# Patient Record
Sex: MALE | Race: WHITE | NOT HISPANIC OR LATINO | URBAN - METROPOLITAN AREA
[De-identification: names, ages, dates, MRNs, and addresses within clinical notes are randomized per-mention and may not be internally consistent; named-entity substitution may affect disease eponyms.]

---

## 2017-02-07 ENCOUNTER — ALLIED HEALTH/NURSE VISIT (OUTPATIENT)
Dept: NURSING | Facility: CLINIC | Age: 47
End: 2017-02-07
Payer: COMMERCIAL

## 2017-02-07 DIAGNOSIS — Z23 NEED FOR HEPATITIS B VACCINATION: Primary | ICD-10-CM

## 2017-02-07 PROCEDURE — 90746 HEPB VACCINE 3 DOSE ADULT IM: CPT

## 2017-02-07 PROCEDURE — 90471 IMMUNIZATION ADMIN: CPT

## 2017-02-07 NOTE — NURSING NOTE
Prior to injection verified patient identity using patient's name and date of birth.  Screening Questionnaire for Adult Immunization    Are you sick today?   No   Do you have allergies to medications, food, a vaccine component or latex?   Yes   Have you ever had a serious reaction after receiving a vaccination?   No   Do you have a long-term health problem with heart disease, lung disease, asthma, kidney disease, metabolic disease (e.g. diabetes), anemia, or other blood disorder?   Yes   Do you have cancer, leukemia, HIV/AIDS, or any other immune system problem?   No   In the past 3 months, have you taken medications that affect  your immune system, such as prednisone, other steroids, or anticancer drugs; drugs for the treatment of rheumatoid arthritis, Crohn s disease, or psoriasis; or have you had radiation treatments?   No   Have you had a seizure, or a brain or other nervous system problem?   No   During the past year, have you received a transfusion of blood or blood     products, or been given immune (gamma) globulin or antiviral drug?   No   For women: Are you pregnant or is there a chance you could become        pregnant during the next month?   No   Have you received any vaccinations in the past 4 weeks?   No     Immunization questionnaire was positive for at least one answer.  Notified Dr. Malave.      MNVFC doesn't apply on this patient    Per orders of Dr. Malave, injection of Hep B given by Tierra Cates. Patient instructed to remain in clinic for 20 minutes afterwards, and to report any adverse reaction to me immediately.       Screening performed by Tierra Cates on 2/7/2017 at 10:33 AM.

## 2017-03-10 ASSESSMENT — ENCOUNTER SYMPTOMS
DOUBLE VISION: 0
EYE REDNESS: 0
EYE WATERING: 0
EYE IRRITATION: 1
EYE PAIN: 0

## 2017-03-13 ENCOUNTER — OFFICE VISIT (OUTPATIENT)
Dept: UROLOGY | Facility: CLINIC | Age: 47
End: 2017-03-13
Payer: COMMERCIAL

## 2017-03-13 VITALS — RESPIRATION RATE: 16 BRPM | SYSTOLIC BLOOD PRESSURE: 106 MMHG | HEART RATE: 64 BPM | DIASTOLIC BLOOD PRESSURE: 74 MMHG

## 2017-03-13 DIAGNOSIS — Z30.09 STERILIZATION CONSULT: Primary | ICD-10-CM

## 2017-03-13 DIAGNOSIS — E10.9 DIABETES MELLITUS TYPE 1, UNCOMPLICATED (H): ICD-10-CM

## 2017-03-13 LAB
ALT SERPL W P-5'-P-CCNC: 24 U/L (ref 0–70)
ANION GAP SERPL CALCULATED.3IONS-SCNC: 7 MMOL/L (ref 3–14)
BUN SERPL-MCNC: 15 MG/DL (ref 7–30)
CALCIUM SERPL-MCNC: 9.1 MG/DL (ref 8.5–10.1)
CHLORIDE SERPL-SCNC: 102 MMOL/L (ref 94–109)
CHOLEST SERPL-MCNC: 106 MG/DL
CO2 SERPL-SCNC: 31 MMOL/L (ref 20–32)
CREAT SERPL-MCNC: 0.89 MG/DL (ref 0.66–1.25)
CREAT UR-MCNC: 149 MG/DL
GFR SERPL CREATININE-BSD FRML MDRD: ABNORMAL ML/MIN/1.7M2
GLUCOSE SERPL-MCNC: 172 MG/DL (ref 70–99)
HBA1C MFR BLD: 8.4 % (ref 4.3–6)
HDLC SERPL-MCNC: 51 MG/DL
LDLC SERPL CALC-MCNC: 46 MG/DL
MICROALBUMIN UR-MCNC: 8 MG/L
MICROALBUMIN/CREAT UR: 5.23 MG/G CR (ref 0–17)
NONHDLC SERPL-MCNC: 55 MG/DL
POTASSIUM SERPL-SCNC: 4.3 MMOL/L (ref 3.4–5.3)
SODIUM SERPL-SCNC: 140 MMOL/L (ref 133–144)
TRIGL SERPL-MCNC: 46 MG/DL
TSH SERPL DL<=0.05 MIU/L-ACNC: 2.81 MU/L (ref 0.4–4)

## 2017-03-13 PROCEDURE — 80061 LIPID PANEL: CPT | Performed by: INTERNAL MEDICINE

## 2017-03-13 PROCEDURE — 82043 UR ALBUMIN QUANTITATIVE: CPT | Performed by: INTERNAL MEDICINE

## 2017-03-13 PROCEDURE — 36415 COLL VENOUS BLD VENIPUNCTURE: CPT | Performed by: INTERNAL MEDICINE

## 2017-03-13 PROCEDURE — 99203 OFFICE O/P NEW LOW 30 MIN: CPT | Performed by: UROLOGY

## 2017-03-13 PROCEDURE — 83036 HEMOGLOBIN GLYCOSYLATED A1C: CPT | Performed by: INTERNAL MEDICINE

## 2017-03-13 PROCEDURE — 80048 BASIC METABOLIC PNL TOTAL CA: CPT | Performed by: INTERNAL MEDICINE

## 2017-03-13 PROCEDURE — 84460 ALANINE AMINO (ALT) (SGPT): CPT | Performed by: INTERNAL MEDICINE

## 2017-03-13 PROCEDURE — 84443 ASSAY THYROID STIM HORMONE: CPT | Performed by: INTERNAL MEDICINE

## 2017-03-13 NOTE — PROGRESS NOTES
"Vasectomy Consult    Reason for visit:   Discuss as a method of birth control/sterilization.  He is interested in vasectomy scrotally.  Patient has 1 children and desires sterilization.  Does not want to use condom or having partners on birth control pills.  He has no erections problem.  He has no urinary complaints.    Current Outpatient Prescriptions   Medication Sig Dispense Refill     NOVOLOG PENFILL 100 UNIT/ML soln Use with Novopen carb counting with meals and using about 20 units per day 30 mL 3     glucagon (GLUCAGON EMERGENCY) 1 MG injection Use as directed 2 each 2     insulin glargine (LANTUS SOLOSTAR) 100 UNIT/ML PEN Inject Subcutaneous. Inject 9 units daily 15 mL 3     loratadine (CLARITIN) 10 MG tablet        insulin pen needle (B-D U/F) 31G X 5 MM Use 5 daily or as directed. 450 each 3     atorvastatin (LIPITOR) 10 MG tablet Take 1 tablet (10 mg) by mouth daily 90 tablet 3     blood glucose (ESTEVAN BREEZE 2) test strip Test 7 times daily 700 each 3     Insulin Pen Needle (PEN NEEDLES 5/16\") 31G X 8 MM MISC Use four times daily or as directed. 360 each 3     Injection Device for insulin (NOVOPEN JR, GREEN,) DUSTIN Use as directed with insulin 1 each 1     Sodium Fluoride (CLINPRO 5000) 1.1 % PSTE Apply  to affected area. Use once every night       Blood Glucose Monitoring Suppl (DatahugIA BREEZE 2 SYSTEM) W/DEVICE KIT Use daily       ACE/ARB NOT PRESCRIBED, INTENTIONAL, by Other route continuous prn.       ASPIRIN 81 MG OR TABS ONE DAILY 100 3     Allergies   Allergen Reactions     Ceftin Rash     Ampicillin Rash     Past Medical History   Diagnosis Date     Chronic lymphocytic thyroiditis 7/20/2011     Chronic lymphocytic thyroiditis 7/20/2011     Depressive disorder 06/01/1997     Treated for a couple of years; not currently experiencing     Diabetes 6/23/2009     Past Surgical History   Procedure Laterality Date     Hc tooth extraction w/forcep        Family History   Problem Relation Age of Onset     " DIABETES Brother      Asthma No family hx of      C.A.D. No family hx of      Hypertension Paternal Grandfather      Hypertension Maternal Grandfather      CEREBROVASCULAR DISEASE No family hx of      Breast Cancer Maternal Aunt      Cancer - colorectal No family hx of      Prostate Cancer No family hx of      DIABETES Brother      Coronary Artery Disease Other      Maternal great grandfather     Hypertension Maternal Grandmother      Hyperlipidemia Mother      Hyperlipidemia Maternal Grandmother      Hyperlipidemia Maternal Grandfather      Hyperlipidemia Brother      CEREBROVASCULAR DISEASE Maternal Grandfather      CEREBROVASCULAR DISEASE Paternal Grandmother      CEREBROVASCULAR DISEASE Paternal Grandfather      Anxiety Disorder Brother      Social History     Social History     Marital status:      Spouse name: N/A     Number of children: N/A     Years of education: N/A     Social History Main Topics     Smoking status: Never Smoker     Smokeless tobacco: Never Used     Alcohol use Yes      Comment: ~ 1 drink/day     Drug use: No     Sexual activity: Yes     Partners: Female     Birth control/ protection: Condom, Natural Family Planning      Comment: Would like to talk about vasectomy     Other Topics Concern     Parent/Sibling W/ Cabg, Mi Or Angioplasty Before 65f 55m? No     Social History Narrative       REVIEW OF SYSTEMS  =================  C: NEGATIVE for fever, chills, change in weight  I: NEGATIVE for worrisome rashes, moles or lesions  E/M: NEGATIVE for ear, mouth and throat problems  R: NEGATIVE for significant cough or SHORTNESS OF BREATH  CV:  NEGATIVE for chest pain, palpitations or peripheral edema  GI: NEGATIVE for nausea, abdominal pain, heartburn, or change in bowel habits  NEURO: NEGATIVE numbness/weakness  : see HPI  PSYCH: NEGATIVE depression/anxiety  LYmph: no new enlarged lymph nodes  Ortho: no new trauma/movements      Physical Exam:  /74 (BP Location: Right arm, Patient  Position: Chair, Cuff Size: Adult Regular)  Pulse 64  Resp 16   Patient is pleasant, in no acute distress, good general condition.  HEENT:  Normalcephalic, atraumatic  Lung: no evidence of respiratory distress    Abdomen: Soft, nondistended, non tender. No masses. No rebound or guarding.   Exam: penis no d/c testis no masses bilateral vas palpated no scrotal lesion  Skin: Warm and dry.  No redness.  Neuro: grossly normal  Psych normal mood and affect  Musculoskeletal  moving all extremities        Discussed    That vasectomy is permanent method of birth control.    That vasectomy can fail due to recanalization of the vas even many months/years later.    That he needs 2 negative sperm checks to be considered sterile    That there are other methods that are not permanent and also that the sperm can be frozen for later use.    How the technique is performed, risks of infection, bleeding, damage to the testes vessels and testes atrophy    Long term complication such as chronic and difficult to treat testes pain and questionable increase incidence of prostate cancer    That the procedure can be done at the clinic or hospital OR        Plan:    Stop Aspirin  Will schedule Vasectomy in the future

## 2017-03-13 NOTE — PATIENT INSTRUCTIONS
Please call [phone] to schedule your vasectomy or if you have any question.     Preparation for Vasectomy:  Shave the hair away from the base of your penis and around your testicles.  Wear snug underwear the day of the vasectomy to support your testicles.  Do not take aspirin, ibuprofen, advil, motrin, aleve products one week prior to your vasectomy.  Arrange for a  if you take any medication for relaxation from the physician.      General Vasectomy Information    Vasectomy is a surgery.  If it is successful, it will be impossible for you to ever father children.  The following information regards the male sterilization done by an operation called a vasectomy.  This is done in the physician's office.    The operation done to sterilize the male is easier, safer and much less expensive than the operation done to sterilize the woman.    Sterilization should be considered permanent.  For most males, once the operation is done, it can never me undone.  There are attempts made occasionally to reconnect the tubes that have been cut during the procedure, but this is very difficult and expensive and works only about 50-70% of the time.  In order for any of the physicians in our clinic to do a vasectomy, we require that you consider this a permanent form a sterilization.    A vasectomy can be done at any time, but it is best to think about having it done when you can take at least one day off from work and any excess activities.    Your decision to have a vasectomy should only be made with the following facts clear in mind.    1. First, a vasectomy is only one of several means of birth control.  Many form of temporary contraception are available.  If you have any questions about other methods, please discuss this with your physician.    2. A vasectomy may be unsuccessful in approximately one out of 1000 couples per year.  This occurs when the tubes which are cut during the procedure reconnect themselves.   Sterility cannot be guaranteed.    3. You should be aware that it is the current belief of the medical profession that a vasectomy procedure does not alter a male physically, physiologically or sexually.  Because each person is a unique individual, there is always the possibility of an adverse phychiatric reaction.  This can be best avoided by being very comfortable in your own mind that you want to have this done, and that you do not want to father any children in the future.  If this is not clear in your mind, this should be further discussed with your physician.    4. You will not notice a change in the volume of your ejaculate since sperm is a very small amount of the semen and it is only the sperm that is stopped from entering the ejaculate after a vasectomy.  Your prostate and seminal vesicle glands really supply most of the semen and this is not at all decreased after a vasectomy.  Also there is no effect on the male hormones.    5. You do not become sterile immediately following a vasectomy due to the fact that there is still sperm remaining in your system that must be eliminated by ejaculation.  For this reason, your sexual partner could still become pregnant for a period of time following the vasectomy operation.  It is necessary that contraceptive measures be used until you receive confirmation from your physician that you are sterile.  It takes approximately 12 ejaculations to clear the semen of sperm, but this can differ in different men.  For this reason, it is very important that your semen be checked for sperm before you are considered sterile, and this should be done approximately 12 weeks after your vasectomy.      6. Vasectomy has risks and benefits.  Among the risks are possible complications resulting from the procedure.  These risks include but are not limited to:   A.  Bleeding, infection, or hematoma occuring during or in the recovery period   from the procedure.   B.  Sperm granuloma or a  small pea to walnut sized lump which is a collection of   scar tissue and sperm in your scrotal sack and remains permanently   C.  There may be an increased risk of prostate cancer although the data is   unclear.

## 2017-03-13 NOTE — NURSING NOTE
"Chief Complaint   Patient presents with     Consult     vas consult       Initial /74 (BP Location: Right arm, Patient Position: Chair, Cuff Size: Adult Regular)  Pulse 64  Resp 16 Estimated body mass index is 24.65 kg/(m^2) as calculated from the following:    Height as of 9/21/16: 1.784 m (5' 10.25\").    Weight as of 9/21/16: 78.5 kg (173 lb).  Medication Reconciliation: complete   Sarah Lucas CMA      "

## 2017-03-13 NOTE — MR AVS SNAPSHOT
After Visit Summary   3/13/2017    Franklin Muir    MRN: [id number]           Patient Information     Date Of Birth          1970        Visit Information        Provider Department      3/13/2017 9:15 AM Geovanny Naqvi MD [facility]        Today's Diagnoses     Sterilization consult    -  1      Care Instructions     Please call [phone] to schedule your vasectomy or if you have any question.     Preparation for Vasectomy:  Shave the hair away from the base of your penis and around your testicles.  Wear snug underwear the day of the vasectomy to support your testicles.  Do not take aspirin, ibuprofen, advil, motrin, aleve products one week prior to your vasectomy.  Arrange for a  if you take any medication for relaxation from the physician.      General Vasectomy Information    Vasectomy is a surgery.  If it is successful, it will be impossible for you to ever father children.  The following information regards the male sterilization done by an operation called a vasectomy.  This is done in the physician's office.    The operation done to sterilize the male is easier, safer and much less expensive than the operation done to sterilize the woman.    Sterilization should be considered permanent.  For most males, once the operation is done, it can never me undone.  There are attempts made occasionally to reconnect the tubes that have been cut during the procedure, but this is very difficult and expensive and works only about 50-70% of the time.  In order for any of the physicians in our clinic to do a vasectomy, we require that you consider this a permanent form a sterilization.    A vasectomy can be done at any time, but it is best to think about having it done when you can take at least one day off from work and any excess activities.    Your decision to have a vasectomy should only be made with the following facts clear in mind.    1. First, a vasectomy is only one of  several means of birth control.  Many form of temporary contraception are available.  If you have any questions about other methods, please discuss this with your physician.    2. A vasectomy may be unsuccessful in approximately one out of 1000 couples per year.  This occurs when the tubes which are cut during the procedure reconnect themselves.  Sterility cannot be guaranteed.    3. You should be aware that it is the current belief of the medical profession that a vasectomy procedure does not alter a male physically, physiologically or sexually.  Because each person is a unique individual, there is always the possibility of an adverse phychiatric reaction.  This can be best avoided by being very comfortable in your own mind that you want to have this done, and that you do not want to father any children in the future.  If this is not clear in your mind, this should be further discussed with your physician.    4. You will not notice a change in the volume of your ejaculate since sperm is a very small amount of the semen and it is only the sperm that is stopped from entering the ejaculate after a vasectomy.  Your prostate and seminal vesicle glands really supply most of the semen and this is not at all decreased after a vasectomy.  Also there is no effect on the male hormones.    5. You do not become sterile immediately following a vasectomy due to the fact that there is still sperm remaining in your system that must be eliminated by ejaculation.  For this reason, your sexual partner could still become pregnant for a period of time following the vasectomy operation.  It is necessary that contraceptive measures be used until you receive confirmation from your physician that you are sterile.  It takes approximately 12 ejaculations to clear the semen of sperm, but this can differ in different men.  For this reason, it is very important that your semen be checked for sperm before you are considered sterile, and this  should be done approximately 12 weeks after your vasectomy.      6. Vasectomy has risks and benefits.  Among the risks are possible complications resulting from the procedure.  These risks include but are not limited to:   A.  Bleeding, infection, or hematoma occuring during or in the recovery period   from the procedure.   B.  Sperm granuloma or a small pea to walnut sized lump which is a collection of   scar tissue and sperm in your scrotal sack and remains permanently   C.  There may be an increased risk of prostate cancer although the data is   unclear.          Follow-ups after your visit        Your next 10 appointments already scheduled     Mar 21, 2017  1:30 PM CDT   (Arrive by 1:00 PM)   RETURN DIABETES with Stacy Wilson MD   [facility] ([facility])    [address] [place] [id number] [phone]              Who to contact     If you have questions or need follow up information about today's clinic visit or your schedule please contact [facility] directly at [phone].  Normal or non-critical lab and imaging results will be communicated to you by MyChart, letter or phone within 4 business days after the clinic has received the results. If you do not hear from us within 7 days, please contact the clinic through [employer] or phone. If you have a critical or abnormal lab result, we will notify you by phone as soon as possible.  Submit refill requests through [employer] or call your pharmacy and they will forward the refill request to us. Please allow 3 business days for your refill to be completed.          Additional Information About Your Visit        [employer] Information     [employer] gives you secure access to your electronic health record. If you see a primary care provider, you can also send messages to your care team and make appointments. If you have questions, please call your primary care clinic.  If you do  not have a primary care provider, please call [phone] and they will assist you.        Care EveryWhere ID     This is your Care EveryWhere ID. This could be used by other organizations to access your [facility] medical records  [id number]        Your Vitals Were     Pulse Respirations                64 16           Blood Pressure from Last 3 Encounters:   03/13/17 106/74   09/21/16 112/67   08/15/16 110/78    Weight from Last 3 Encounters:   09/21/16 78.5 kg (173 lb)   08/15/16 75.3 kg (166 lb)   06/01/16 80.1 kg (176 lb 8 oz)              Today, you had the following     No orders found for display       Primary Care Provider Office Phone # Fax #    Ruddy Malave [phone]       [address] [address]        Thank you!     Thank you for choosing [facility]  for your care. Our goal is always to provide you with excellent care. Hearing back from our patients is one way we can continue to improve our services. Please take a few minutes to complete the written survey that you may receive in the mail after your visit with us. Thank you!             Your Updated Medication List - Protect others around you: Learn how to safely use, store and throw away your medicines at www.disposemymeds.org.          This list is accurate as of: 3/13/17  9:22 AM.  Always use your most recent med list.                   Brand Name Dispense Instructions for use    ACE/ARB NOT PRESCRIBED (INTENTIONAL)      by Other route continuous prn.       aspirin 81 MG tablet     100    ONE DAILY       atorvastatin 10 MG tablet    LIPITOR    90 tablet    Take 1 tablet (10 mg) by mouth daily       blood glucose monitoring meter device kit      Use daily       blood glucose test strip    ESTEVAN BREEZE 2    700 each    Test 7 times daily       CLINPRO 5000 1.1 % Pste   Generic drug:  Sodium Fluoride      Apply  to affected area. Use once every night        "glucagon 1 MG kit    GLUCAGON EMERGENCY    2 each    Use as directed       Injection Device for insulin Carmen    NOVOPEN JR (GREEN)    1 each    Use as directed with insulin       insulin glargine 100 UNIT/ML injection    LANTUS SOLOSTAR    15 mL    Inject Subcutaneous. Inject 9 units daily       loratadine 10 MG tablet    CLARITIN         NovoLOG PENFILL 100 UNIT/ML injection   Generic drug:  insulin aspart     30 mL    Use with Novopen carb counting with meals and using about 20 units per day       * pen needles 5/16\" 31G X 8 MM Misc     360 each    Use four times daily or as directed.       * insulin pen needle 31G X 5 MM    B-D U/F    450 each    Use 5 daily or as directed.       * Notice:  This list has 2 medication(s) that are the same as other medications prescribed for you. Read the directions carefully, and ask your doctor or other care provider to review them with you.      "

## 2017-03-21 ENCOUNTER — OFFICE VISIT (OUTPATIENT)
Dept: ENDOCRINOLOGY | Facility: CLINIC | Age: 47
End: 2017-03-21

## 2017-03-21 VITALS
HEART RATE: 64 BPM | WEIGHT: 171.5 LBS | SYSTOLIC BLOOD PRESSURE: 118 MMHG | BODY MASS INDEX: 24.55 KG/M2 | HEIGHT: 70 IN | DIASTOLIC BLOOD PRESSURE: 79 MMHG

## 2017-03-21 DIAGNOSIS — E10.9 TYPE 1 DIABETES MELLITUS WITHOUT COMPLICATION (H): Primary | ICD-10-CM

## 2017-03-21 ASSESSMENT — PAIN SCALES - GENERAL: PAINLEVEL: NO PAIN (0)

## 2017-03-21 NOTE — PROGRESS NOTES
Diabetes and Endocrinology Outpatient Visit        HPI:   Franklin is a 46 year old male with type 1 DM (diagnosed ,  and anti DEE > 250), positive TPO antibody with normal TFT who was here for routine follow up.  He used to see Dr. Swenson in our clinic, and he is a new patient to me.    He was last seen in clinic 2016.    He is currently on Lantus to 10 U daily at 7 AM and Novolog 1 unit/ 20 grams of CHO with meals and snacks, and for pre-meal correction @ 1/2 U per 30 mg/dL above 200.     Download from his meter shows patient is checking his blood glucose 2.8 times per day, average is 171 with a standard deviation of 63.  Lowest recorded blood glucose value is 64.   BG in the AM are elevated (~195), ~ 182 at lunch time and 154 in the evening.  He denies hypoglycemia during the night.  Franklin develops symptoms of hypoglycemia around BG of 70  A1c was 8.4% on 3/14/17    Franklin was traveling and has changed his eating and exercise routine while away, and has also had a cold for the past few days, so he believes these have impacted his BG values, as his A1c is now 8.4%.  He is usually very active, and does cardio plus weight lifting for one hour three times per week, usually between breakfast and lunch, and plays basketball in the evenings.  He is on sabbatical, so this is also a change for him.     Diabetes complications  Retinopathy: No recent vision changes and up to date with yearly eye exams, no DR in last opthalmology examination (2016)  Nephropathy: No nephropathy - negative microalbuminuria and normal GFR  Neuropathy: No neuropathy    Brother  at age 26 from DKA    History of Hashimoto's ab positivity with normal function, has never been on levothyroxine replacement therapy.  He does not recall being screened for celiac disease.  He denies bloating/diarrhea/constipation and/or gluten intolerance.    His LDL was his slightly above 100 and  "patient was started on statin    Past Medical History    Past Medical History   Diagnosis Date     Chronic lymphocytic thyroiditis 7/20/2011     Chronic lymphocytic thyroiditis 7/20/2011     Depressive disorder 06/01/1997     Treated for a couple of years; not currently experiencing     Diabetes 6/23/2009       Current Medications   Current Outpatient Rx   Medication Sig Dispense Refill     NOVOLOG PENFILL 100 UNIT/ML soln Use with Novopen carb counting with meals and using about 20 units per day 30 mL 3     glucagon (GLUCAGON EMERGENCY) 1 MG injection Use as directed 2 each 2     insulin glargine (LANTUS SOLOSTAR) 100 UNIT/ML PEN Inject Subcutaneous. Inject 9 units daily 15 mL 3     loratadine (CLARITIN) 10 MG tablet        insulin pen needle (B-D U/F) 31G X 5 MM Use 5 daily or as directed. 450 each 3     atorvastatin (LIPITOR) 10 MG tablet Take 1 tablet (10 mg) by mouth daily 90 tablet 3     blood glucose (ESTEVAN BREEZE 2) test strip Test 7 times daily 700 each 3     Insulin Pen Needle (PEN NEEDLES 5/16\") 31G X 8 MM MISC Use four times daily or as directed. 360 each 3     Injection Device for insulin (NOVOPEN JR, GREEN,) DUSTIN Use as directed with insulin 1 each 1     Sodium Fluoride (CLINPRO 5000) 1.1 % PSTE Apply  to affected area. Use once every night       Blood Glucose Monitoring Suppl (ContextWebEZE 2 SYSTEM) W/DEVICE KIT Use daily       ACE/ARB NOT PRESCRIBED, INTENTIONAL, by Other route continuous prn.       ASPIRIN 81 MG OR TABS ONE DAILY 100 3       Family History  Family History   Problem Relation Age of Onset     DIABETES Brother      Asthma No family hx of      C.A.D. No family hx of      Hypertension Paternal Grandfather      Hypertension Maternal Grandfather      CEREBROVASCULAR DISEASE No family hx of      Breast Cancer Maternal Aunt      Cancer - colorectal No family hx of      Prostate Cancer No family hx of      DIABETES Brother      Coronary Artery Disease Other      Maternal great grandfather " "    Hypertension Maternal Grandmother      Hyperlipidemia Mother      Hyperlipidemia Maternal Grandmother      Hyperlipidemia Maternal Grandfather      Hyperlipidemia Brother      CEREBROVASCULAR DISEASE Maternal Grandfather      CEREBROVASCULAR DISEASE Paternal Grandmother      CEREBROVASCULAR DISEASE Paternal Grandfather      Anxiety Disorder Brother      Brother with T1D  at age 26 from DKA        Social History  History     Social History     Marital Status:      Spouse Name: N/A     Number of Children: N/A     Years of Education: N/A     Social History Main Topics     Smoking status: Never Smoker      Smokeless tobacco: Never Used     Alcohol Use: Yes      Comment: Socially, 4-5 drinks/wk     Drug Use: No     Sexual Activity:     Partners: Female     Birth Control/ Protection: Condom     Other Topics Concern     Parent/Sibling W/ Cabg, Mi Or Angioplasty Before 65f 55m? 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 Active.   EYES: anicteric, normal extra-ocular movements, no lid lag or retraction   HEENT: Mouth/Throat: Mucous membrane is moist. Oropharynx is clear. No adenopathy.  Thyroid:  Thyroid is barely palpable, only left lobe is palpable, no discrete nodules, normal consistency   Cardiovascular: RRR, S1, S2 normal. No m/g/r   Pulmonary/Chest: CTAB. No wheezing or rales   Abdominal: +BS. Non tender to palpation. No organomegaly present.  Neurological: Alert. CNII-XII intact. Muscle strength 5/5. Sensory is intact.  Extremities: No clubbing, cyanosis or inflammation. No tremor of the outstretched hands.   Skin: normal texture, color and temperature  Feet: No lesions or ulcers. Pedal pulse is palpable.  Vibratory sensation and pinprick are " normal.  Lymphatic: no cervical lymphadenopathy.  Psychological: appropriate mood and affect                            Lab Results    ENDO DIABETES Latest Ref Rng & Units 3/13/2017   HEMOGLOBIN A1C 4.3 - 6.0 % 8.4 (H)   HEMOGLOBIN A1C, POC 4.3 - 6.0 %    GLUCOSE 70 - 99 mg/dL 172 (H)   CHOLESTEROL <200 mg/dL 106   LDL CHOLESTEROL, CALCULATED <100 mg/dL 46   HDL CHOLESTEROL >39 mg/dL 51   VLDL-CHOLESTEROL 0 - 30 mg/dL    NON HDL CHOLESTEROL <130 mg/dL 55   TRIGLYCERIDES <150 mg/dL 46   ALBUMIN URINE MG/L mg/L 8   ALBUMIN URINE MG/G CR 0 - 17 mg/g Cr 5.23   CREATININE 0.66 - 1.25 mg/dL 0.89   POTASSIUM 3.4 - 5.3 mmol/L 4.3   ALT 0 - 70 U/L 24   ENDO THYROID LABS-UMP Latest Ref Rng & Units 3/13/2017   TSH 0.40 - 4.00 mU/L 2.81         Assessment and Plan  Franklin is a 46 year old male with type 1 DM (diagnosed 2009,  and anti DEE > 250), positive TPO antibody with normal TFT and hyperlipidemia who was here for routine follow up.    1. Type 1 Diabetes: Type 1 diabetes was diagnosed 7 years ago.  Franklin is currently on Lantus to 10 U daily and Novolog 1 unit/ 20 grams of CHO with meals and snacks, and correction at 1/2 U per 30 mg/dL above 200.  We discussed in detail the short and long-term goals of diabetes management, as well as A1c goals. I will modify his insulin regimen slightly, as I am concerned his Lantus is not lasting 24 h.  We will move it to noon time, so that if needed, Novolog taken with breakfast can help covering any gap on long-acting insulin.  I will also modify his correction scale as to start at 150 during the day and 180 mg/dL at night.  Based on his Lantus dose, calculated Novolog doses would be lower than current, at 1/25 g CHO and 1/85 mg/dL.  We will modify insulin doses as above, and make further adjustments depending on BG values.  I have also discussed the use of an insulin pump, and the patient will see in our diabetes educator to learn more about the multiple pump options.  We will  also consider a diagnostic glucose sensor to help adjusting his insulin dose.    Written instructions were provided to the patient.    2. Chronic diabetic complications:  There is no evidence of chronic diabetic complications    3. History of TPO antibodies consistent with Hashimoto's: Patient has positive TPO Ab, with  normal TSH and free T4.  We will continue to monitor and check TFT annually or if symptoms of hypo-or hyperthyroidism.    4. Cholesterol management:  Patient was started on Lipitor due to slightly increased LDL.  He has changed modified his diet and is exercising more. We will consider temporarily D/C Lipitor once his BG improves and he is back on his exercise routine, and repeat fasting lipid profile after he has been off Lipitor for 3 months.  He is otherwise tolerating the medication well and has no concerns.    5. Health maintenance:  Patient does not recall been screened for celiac disease.  Review of EMR do not indicate this has been done.  We will screen with TTA as pt is not on a gluten-free diet.       Orders Placed This Encounter   Procedures     Tissue transglutaminase eliud IgA and IgG     DIABETES EDUCATOR REFERRAL       I will contact Mr. Muir with the test results and see him back in clinic in 3 months or sooner if needed.    Freya Wilson MD PhD     Division of Endocrinology and Diabetes

## 2017-03-21 NOTE — LETTER
3/21/2017       RE: Franklin Muir  [address]  [place] [address]     Dear Colleague,    Thank you for referring your patient, Franklin Muir, to the [facility] ENDOCRINOLOGY at [facility]. Please see a copy of my visit note below.                                                      Diabetes and Endocrinology Outpatient Visit        HPI:   Franklin is a 46 year old male with type 1 DM (diagnosed ,  and anti DEE > 250), positive TPO antibody with normal TFT who was here for routine follow up.  He used to see Dr. Swenson in our clinic, and he is a new patient to me.    He was last seen in clinic 2016.    He is currently on Lantus to 10 U daily at 7 AM and Novolog 1 unit/ 20 grams of CHO with meals and snacks, and for pre-meal correction @ 1/2 U per 30 mg/dL above 200.     Download from his meter shows patient is checking his blood glucose 2.8 times per day, average is 171 with a standard deviation of 63.  Lowest recorded blood glucose value is 64.   BG in the AM are elevated (~195), ~ 182 at lunch time and 154 in the evening.  He denies hypoglycemia during the night.  Franklin develops symptoms of hypoglycemia around BG of 70  A1c was 8.4% on 3/14/17    Franklin was traveling and has changed his eating and exercise routine while away, and has also had a cold for the past few days, so he believes these have impacted his BG values, as his A1c is now 8.4%.  He is usually very active, and does cardio plus weight lifting for one hour three times per week, usually between breakfast and lunch, and plays basketball in the evenings.  He is on sabbatical, so this is also a change for him.     Diabetes complications  Retinopathy: No recent vision changes and up to date with yearly eye exams, no DR in last opthalmology examination (2016)  Nephropathy: No nephropathy - negative microalbuminuria and normal GFR  Neuropathy: No neuropathy    Brother  at age 26 from  "DKA    History of Hashimoto's ab positivity with normal function, has never been on levothyroxine replacement therapy.  He does not recall being screened for celiac disease.  He denies bloating/diarrhea/constipation and/or gluten intolerance.    His LDL was his slightly above 100 and patient was started on statin    Past Medical History    Past Medical History   Diagnosis Date     Chronic lymphocytic thyroiditis 7/20/2011     Chronic lymphocytic thyroiditis 7/20/2011     Depressive disorder 06/01/1997     Treated for a couple of years; not currently experiencing     Diabetes 6/23/2009       Current Medications   Current Outpatient Rx   Medication Sig Dispense Refill     NOVOLOG PENFILL 100 UNIT/ML soln Use with Novopen carb counting with meals and using about 20 units per day 30 mL 3     glucagon (GLUCAGON EMERGENCY) 1 MG injection Use as directed 2 each 2     insulin glargine (LANTUS SOLOSTAR) 100 UNIT/ML PEN Inject Subcutaneous. Inject 9 units daily 15 mL 3     loratadine (CLARITIN) 10 MG tablet        insulin pen needle (B-D U/F) 31G X 5 MM Use 5 daily or as directed. 450 each 3     atorvastatin (LIPITOR) 10 MG tablet Take 1 tablet (10 mg) by mouth daily 90 tablet 3     blood glucose (ESTEVAN BREEZE 2) test strip Test 7 times daily 700 each 3     Insulin Pen Needle (PEN NEEDLES 5/16\") 31G X 8 MM MISC Use four times daily or as directed. 360 each 3     Injection Device for insulin (NOVOPEN JR, GREEN,) DUSTIN Use as directed with insulin 1 each 1     Sodium Fluoride (CLINPRO 5000) 1.1 % PSTE Apply  to affected area. Use once every night       Blood Glucose Monitoring Suppl ([employer] BREEZE 2 SYSTEM) W/DEVICE KIT Use daily       ACE/ARB NOT PRESCRIBED, INTENTIONAL, by Other route continuous prn.       ASPIRIN 81 MG OR TABS ONE DAILY 100 3       Family History  Family History   Problem Relation Age of Onset     DIABETES Brother      Asthma No family hx of      C.A.D. No family hx of      Hypertension Paternal Grandfather " "     Hypertension Maternal Grandfather      CEREBROVASCULAR DISEASE No family hx of      Breast Cancer Maternal Aunt      Cancer - colorectal No family hx of      Prostate Cancer No family hx of      DIABETES Brother      Coronary Artery Disease Other      Maternal great grandfather     Hypertension Maternal Grandmother      Hyperlipidemia Mother      Hyperlipidemia Maternal Grandmother      Hyperlipidemia Maternal Grandfather      Hyperlipidemia Brother      CEREBROVASCULAR DISEASE Maternal Grandfather      CEREBROVASCULAR DISEASE Paternal Grandmother      CEREBROVASCULAR DISEASE Paternal Grandfather      Anxiety Disorder Brother      Brother with T1D  at age 26 from DKA        Social History  History     Social History     Marital Status:      Spouse Name: N/A     Number of Children: N/A     Years of Education: N/A     Social History Main Topics     Smoking status: Never Smoker      Smokeless tobacco: Never Used     Alcohol Use: Yes      Comment: Socially, 4-5 drinks/wk     Drug Use: No     Sexual Activity:     Partners: Female     Birth Control/ Protection: Condom     Other Topics Concern     Parent/Sibling W/ Cabg, Mi Or Angioplasty Before 65f 55m? 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 Active.   EYES: anicteric, normal extra-ocular movements, no lid lag or retraction   HEENT: Mouth/Throat: Mucous membrane is moist. Oropharynx is clear. No adenopathy.  Thyroid:  Thyroid is barely palpable, only left lobe is palpable, no discrete nodules, normal consistency   Cardiovascular: RRR, S1, S2 normal. No m/g/r   Pulmonary/Chest: CTAB. No wheezing or rales   Abdominal: +BS. Non tender to palpation. No organomegaly present.  Neurological: Alert. " CNII-XII intact. Muscle strength 5/5. Sensory is intact.  Extremities: No clubbing, cyanosis or inflammation. No tremor of the outstretched hands.   Skin: normal texture, color and temperature  Feet: No lesions or ulcers. Pedal pulse is palpable.  Vibratory sensation and pinprick are normal.  Lymphatic: no cervical lymphadenopathy.  Psychological: appropriate mood and affect                            Lab Results    ENDO DIABETES Latest Ref Rng & Units 3/13/2017   HEMOGLOBIN A1C 4.3 - 6.0 % 8.4 (H)   HEMOGLOBIN A1C, POC 4.3 - 6.0 %    GLUCOSE 70 - 99 mg/dL 172 (H)   CHOLESTEROL <200 mg/dL 106   LDL CHOLESTEROL, CALCULATED <100 mg/dL 46   HDL CHOLESTEROL >39 mg/dL 51   VLDL-CHOLESTEROL 0 - 30 mg/dL    NON HDL CHOLESTEROL <130 mg/dL 55   TRIGLYCERIDES <150 mg/dL 46   ALBUMIN URINE MG/L mg/L 8   ALBUMIN URINE MG/G CR 0 - 17 mg/g Cr 5.23   CREATININE 0.66 - 1.25 mg/dL 0.89   POTASSIUM 3.4 - 5.3 mmol/L 4.3   ALT 0 - 70 U/L 24   ENDO THYROID LABS-UMP Latest Ref Rng & Units 3/13/2017   TSH 0.40 - 4.00 mU/L 2.81         Assessment and Plan  Franklin is a 46 year old male with type 1 DM (diagnosed 2009,  and anti DEE > 250), positive TPO antibody with normal TFT and hyperlipidemia who was here for routine follow up.    1. Type 1 Diabetes: Type 1 diabetes was diagnosed 7 years ago.  Franklin is currently on Lantus to 10 U daily and Novolog 1 unit/ 20 grams of CHO with meals and snacks, and correction at 1/2 U per 30 mg/dL above 200.  We discussed in detail the short and long-term goals of diabetes management, as well as A1c goals. I will modify his insulin regimen slightly, as I am concerned his Lantus is not lasting 24 h.  We will move it to noon time, so that if needed, Novolog taken with breakfast can help covering any gap on long-acting insulin.  I will also modify his correction scale as to start at 150 during the day and 180 mg/dL at night.  Based on his Lantus dose, calculated Novolog doses would be lower than  current, at 1/25 g CHO and 1/85 mg/dL.  We will modify insulin doses as above, and make further adjustments depending on BG values.  I have also discussed the use of an insulin pump, and the patient will see in our diabetes educator to learn more about the multiple pump options.  We will also consider a diagnostic glucose sensor to help adjusting his insulin dose.    Written instructions were provided to the patient.    2. Chronic diabetic complications:  There is no evidence of chronic diabetic complications    3. History of TPO antibodies consistent with Hashimoto's: Patient has positive TPO Ab, with  normal TSH and free T4.  We will continue to monitor and check TFT annually or if symptoms of hypo-or hyperthyroidism.    4. Cholesterol management:  Patient was started on Lipitor due to slightly increased LDL.  He has changed modified his diet and is exercising more. We will consider temporarily D/C Lipitor once his BG improves and he is back on his exercise routine, and repeat fasting lipid profile after he has been off Lipitor for 3 months.  He is otherwise tolerating the medication well and has no concerns.    5. Health maintenance:  Patient does not recall been screened for celiac disease.  Review of EMR do not indicate this has been done.  We will screen with TTA as pt is not on a gluten-free diet.       Orders Placed This Encounter   Procedures     Tissue transglutaminase eliud IgA and IgG     DIABETES EDUCATOR REFERRAL       I will contact Mr. Muir with the test results and see him back in clinic in 3 months or sooner if needed.    Freya Wilson MD PhD     Division of Endocrinology and Diabetes

## 2017-03-21 NOTE — MR AVS SNAPSHOT
After Visit Summary   3/21/2017    Franklin Muir    MRN: [id number]           Patient Information     Date Of Birth          1970        Visit Information        Provider Department      3/21/2017 1:30 PM Stacy Wilson MD [facility]        Today's Diagnoses     Type 1 diabetes mellitus without complication (H)    -  1      Care Instructions    Move Lantus to noon time, same dose (10 units). Consider increasing to 11 units if hyperglycemia in the morning persists    Start correction at 150 mg/dL during the day and 180 mg/dL during the night  0.5 units per 30 mg/dL (consider changing to 40 mg/dL if hypoglycemia develops after correction)        Follow-ups after your visit        Additional Services     DIABETES EDUCATOR REFERRAL       DIABETES SELF MANAGEMENT TRAINING (DSMT)      Your provider has referred you to Diabetes Education: [facility]: Endocrinology and Diabetes Clinic -  [facility] [phone]   [email or web address]    Type of training and number of hours: Previous Diagnosis: Follow-up DSMT - 2 hours.    Medicare covers: 10 hours of initiType 1al DSMT in 12 month period from the time of first visit, plus 2 hours of follow-up DSMT annually, and additional hours as requested for insulin training.    Diabetes Type:              Diabetes Co-Morbidities: none               A1C Goal:  <7.0       A1C is: Lab Results       Component                Value               Date                       A1C                      8.4                 03/13/2017              If an urgent visit is needed or A1C is above 12, Care Team to call the Diabetes Education Team at [phone] or send an In Basket message to the Diabetes Education Pool (P DIAB ED-PATIENT CARE).    Diabetes Education Topics: Knowledge: INSULIN PUMP    Special Educational Needs Requiring Individual DSMT: None       MEDICAL NUTRITION THERAPY  (MNT) for Diabetes    Medical Nutrition Therapy with a Registered Dietitian can be provided in coordination with Diabetes Self-Management Training to assist in achieving optimal diabetes management.    MNT Type and Hours: Previous diagnosis: Annual follow-up MNT - 2 hours                       Medicare will cover: 3 hours initial MNT in 12 month period after first visit, plus 2 hours of follow-up MNT annually    Please be aware that coverage of these services is subject to the terms and limitations of your health insurance plan.  Call member services at your health plan to determine Diabetes Self-Management Training benefits and ask which blood glucose monitor brands are covered by your plan.      Please bring the following with you to your appointment:    (1)  List of current medications   (2)  List of Blood Glucose Monitor brands that are covered by your insurance plan  (3)  Blood Glucose Monitor and log book  (4)   Food records for the 3 days prior to your visit    The Certified Diabetes Educator may make diabetes medication adjustments per the CDE Protocol and Collaborative Practice Agreement.                  Follow-up notes from your care team     Return in about 3 months (around 6/21/2017).      Your next 10 appointments already scheduled     Apr 10, 2017 12:30 PM CDT   (Arrive by 12:15 PM)   Office Visit with Angie Zhao RN   [facility] ([facility])    [address] [place] [address] [phone]           Bring a current list of meds and any records pertaining to this visit.  For Physicals, please bring immunization records and any forms needing to be filled out.  Please arrive 10 minutes early to complete paperwork.            Jul 25, 2017  1:30 PM CDT   (Arrive by 1:15 PM)   RETURN DIABETES with Stacy Wilson MD   [facility] ([facility])    [address] [place] [address]  [id number] [phone]              Future tests that were ordered for you today     Open Future Orders        Priority Expected Expires Ordered    Tissue transglutaminase eliud IgA and IgG Routine 5/22/2017 7/21/2017 3/21/2017            Who to contact     Please call your clinic at [phone] to:    Ask questions about your health    Make or cancel appointments    Discuss your medicines    Learn about your test results    Speak to your doctor   If you have compliments or concerns about an experience at your clinic, or if you wish to file a complaint, please contact [facility] Patient Relations at [phone] or email us at [email or web address]         Additional Information About Your Visit        [employer] Information     [employer] gives you secure access to your electronic health record. If you see a primary care provider, you can also send messages to your care team and make appointments. If you have questions, please call your primary care clinic.  If you do not have a primary care provider, please call [phone] and they will assist you.      [employer] is an electronic gateway that provides easy, online access to your medical records. With [employer], you can request a clinic appointment, read your test results, renew a prescription or communicate with your care team.     To access your existing account, please contact your [facility] Clinic or call [phone] for assistance.        Care EveryWhere ID     This is your Care EveryWhere ID. This could be used by other organizations to access your [facility] medical records  [id number]        Your Vitals Were     Pulse Height BMI (Body Mass Index)             64 1.784 m (5' 10.25\") 24.43 kg/m2          Blood Pressure from Last 3 Encounters:   03/21/17 118/79   03/13/17 106/74   09/21/16 112/67    Weight from Last 3 Encounters:   03/21/17 77.8 kg (171 lb 8 oz)   09/21/16 78.5 kg (173 lb)   08/15/16 75.3 " "kg (166 lb)              We Performed the Following     DIABETES EDUCATOR REFERRAL        Primary Care Provider Office Phone # Fax #    Ruddy Robbin Malave [phone]       [address] [address]        Thank you!     Thank you for choosing [facility] ENDOCRINOLOGY  for your care. Our goal is always to provide you with excellent care. Hearing back from our patients is one way we can continue to improve our services. Please take a few minutes to complete the written survey that you may receive in the mail after your visit with us. Thank you!             Your Updated Medication List - Protect others around you: Learn how to safely use, store and throw away your medicines at www.disposemymeds.org.          This list is accurate as of: 3/21/17  3:29 PM.  Always use your most recent med list.                   Brand Name Dispense Instructions for use    ACE/ARB NOT PRESCRIBED (INTENTIONAL)      by Other route continuous prn.       aspirin 81 MG tablet     100    ONE DAILY       atorvastatin 10 MG tablet    LIPITOR    90 tablet    Take 1 tablet (10 mg) by mouth daily       blood glucose monitoring meter device kit      Use daily       blood glucose test strip    ESTEVAN BREEZE 2    700 each    Test 7 times daily       CLINPRO 5000 1.1 % Pste   Generic drug:  Sodium Fluoride      Apply  to affected area. Use once every night       glucagon 1 MG kit    GLUCAGON EMERGENCY    2 each    Use as directed       Injection Device for insulin Carmen    NOVOPEN JR (GREEN)    1 each    Use as directed with insulin       insulin glargine 100 UNIT/ML injection    LANTUS SOLOSTAR    15 mL    Inject Subcutaneous. Inject 9 units daily       loratadine 10 MG tablet    CLARITIN         NovoLOG PENFILL 100 UNIT/ML injection   Generic drug:  insulin aspart     30 mL    Use with Novopen carb counting with meals and using about 20 units per day       * pen needles 5/16\" 31G X 8 MM " Misc     360 each    Use four times daily or as directed.       * insulin pen needle 31G X 5 MM    B-D U/F    450 each    Use 5 daily or as directed.       * Notice:  This list has 2 medication(s) that are the same as other medications prescribed for you. Read the directions carefully, and ask your doctor or other care provider to review them with you.

## 2017-03-21 NOTE — PATIENT INSTRUCTIONS
Move Lantus to noon time, same dose (10 units). Consider increasing to 11 units if hyperglycemia in the morning persists    Start correction at 150 mg/dL during the day and 180 mg/dL during the night  0.5 units per 30 mg/dL (consider changing to 40 mg/dL if hypoglycemia develops after correction)

## 2017-03-21 NOTE — NURSING NOTE
Chief Complaint   Patient presents with     RECHECK     F/U TYPE I DM     Rosy Gonzalez, CMA  Endocrinology & Diabetes 3G    Capillary Fingerstick performed for an Hemoglobin A1C test

## 2017-04-10 ENCOUNTER — MYC MEDICAL ADVICE (OUTPATIENT)
Dept: EDUCATION SERVICES | Facility: CLINIC | Age: 47
End: 2017-04-10

## 2017-04-10 ENCOUNTER — OFFICE VISIT (OUTPATIENT)
Dept: EDUCATION SERVICES | Facility: CLINIC | Age: 47
End: 2017-04-10

## 2017-04-10 DIAGNOSIS — E10.649 TYPE 1 DIABETES MELLITUS WITH HYPOGLYCEMIA AND WITHOUT COMA (H): Primary | ICD-10-CM

## 2017-04-10 NOTE — MR AVS SNAPSHOT
"              After Visit Summary   4/10/2017    Franklin Muir    MRN: [id number]           Patient Information     Date Of Birth          1970        Visit Information        Provider Department      4/10/2017 12:30 PM Angie Zhao RN [facility]        Care Instructions    INSTRUCTIONS FOR WEARING THE DEXCOM CONTINUOUS GLUCOSE MONITOR (CGM):      1. After 2-3 hours, will be prompted to input 2 blood sugar readings - take two different samples (from different fingers) one right after the other, and enter in the ENTER BG menu on the .  This is called calibrating the CGM.      2. Take at least 1 blood sugar reading every 12 hours to calibrate the .  Remember that you should NEVER base a decision to correct a high BG or treat a low BG on the CGM reading.  Always check a blood glucose (BG) reading first.     3.  Don't calibrate the  if you see straight up or down directional arrows.  It can make subsequent readings inaccurate.  Wait to ENTER BG until it is >70 and < 300.     4.  It's a good idea to calibrate the CGM before you go to bed so you are not awakened in the middle of the night to do this.     5. Record what you eat at meals and snacks with portion eaten. Record amount of carbohydrate grams in the DexCom  when you eat to help track response to food intake. Record exercise type and time. Record medications you take and the time they are taken.     6. When monitor reads that \"Sensor needs to be Changed,\" go into menu function and hit \"STOP\" (NOT shutdown)    5. If battery power is low (will see indicator on monitor), plug the  in to be charged.  It takes about an hour to fully charge.     7. Avoid taking Tylenol while wearing the DexCom, as it can cause inaccurate glucose readings for about 6 hours you take it.     7. Return all equipment and any food/exercise/medication logs to [facility].  Put everything in the envelope " provided and give to one of the concierges on the first or third floor.     8. Follow-up appointment for review of sensor reports schedule for April 24 at 10:30am          Follow-ups after your visit        Your next 10 appointments already scheduled     Apr 24, 2017 10:30 AM CDT   (Arrive by 10:15 AM)   Office Visit with Angie Zhao RN   [facility] ([facility])    [address] [place] [address] [phone]           Bring a current list of meds and any records pertaining to this visit.  For Physicals, please bring immunization records and any forms needing to be filled out.  Please arrive 10 minutes early to complete paperwork.            Jul 25, 2017  1:30 PM CDT   (Arrive by 1:15 PM)   RETURN DIABETES with Stacy Wilson MD   [facility] Endocrinology ([facility])    [address] [place] [address] [phone]              Who to contact     Please call your clinic at [phone] to:    Ask questions about your health    Make or cancel appointments    Discuss your medicines    Learn about your test results    Speak to your doctor   If you have compliments or concerns about an experience at your clinic, or if you wish to file a complaint, please contact [facility] Patient Relations at [phone] or email us at [email or web address]         Additional Information About Your Visit        [employer] Information     [employer] gives you secure access to your electronic health record. If you see a primary care provider, you can also send messages to your care team and make appointments. If you have questions, please call your primary care clinic.  If you do not have a primary care provider, please call [phone] and they will assist you.      [employer] is an electronic gateway that provides easy, online access to your medical records. With [employer],  you can request a clinic appointment, read your test results, renew a prescription or communicate with your care team.     To access your existing account, please contact your [facility] or call [phone] for assistance.        Care EveryWhere ID     This is your Care EveryWhere ID. This could be used by other organizations to access your [facility] medical records  [id number]         Blood Pressure from Last 3 Encounters:   03/21/17 118/79   03/13/17 106/74   09/21/16 112/67    Weight from Last 3 Encounters:   03/21/17 77.8 kg (171 lb 8 oz)   09/21/16 78.5 kg (173 lb)   08/15/16 75.3 kg (166 lb)              Today, you had the following     No orders found for display       Primary Care Provider Office Phone # Fax #    Ruddy Malave [phone]       [address] [address]        Thank you!     Thank you for choosing [facility] DI[facility]  for your care. Our goal is always to provide you with excellent care. Hearing back from our patients is one way we can continue to improve our services. Please take a few minutes to complete the written survey that you may receive in the mail after your visit with us. Thank you!             Your Updated Medication List - Protect others around you: Learn how to safely use, store and throw away your medicines at www.disposemymeds.org.          This list is accurate as of: 4/10/17  1:43 PM.  Always use your most recent med list.                   Brand Name Dispense Instructions for use    ACE/ARB NOT PRESCRIBED (INTENTIONAL)      by Other route continuous prn.       aspirin 81 MG tablet     100    ONE DAILY       atorvastatin 10 MG tablet    LIPITOR    90 tablet    Take 1 tablet (10 mg) by mouth daily       blood glucose monitoring meter device kit      Use daily       blood glucose test strip    ESTEVAN BREEZE 2    700 each    Test 7 times daily       CLINPRO 5000 1.1 % Pste  "  Generic drug:  Sodium Fluoride      Apply  to affected area. Use once every night       glucagon 1 MG kit    GLUCAGON EMERGENCY    2 each    Use as directed       Injection Device for insulin Carmen    NOVOPEN JR (GREEN)    1 each    Use as directed with insulin       insulin glargine 100 UNIT/ML injection    LANTUS SOLOSTAR    15 mL    Inject Subcutaneous. Inject 9 units daily       loratadine 10 MG tablet    CLARITIN         NovoLOG PENFILL 100 UNIT/ML injection   Generic drug:  insulin aspart     30 mL    Use with Novopen carb counting with meals and using about 20 units per day       * pen needles 5/16\" 31G X 8 MM Misc     360 each    Use four times daily or as directed.       * insulin pen needle 31G X 5 MM    B-D U/F    450 each    Use 5 daily or as directed.       * Notice:  This list has 2 medication(s) that are the same as other medications prescribed for you. Read the directions carefully, and ask your doctor or other care provider to review them with you.      "

## 2017-04-10 NOTE — PATIENT INSTRUCTIONS
"INSTRUCTIONS FOR WEARING THE DEXCOM CONTINUOUS GLUCOSE MONITOR (CGM):      1. After 2-3 hours, will be prompted to input 2 blood sugar readings - take two different samples (from different fingers) one right after the other, and enter in the ENTER BG menu on the .  This is called calibrating the CGM.      2. Take at least 1 blood sugar reading every 12 hours to calibrate the .  Remember that you should NEVER base a decision to correct a high BG or treat a low BG on the CGM reading.  Always check a blood glucose (BG) reading first.     3.  Don't calibrate the  if you see straight up or down directional arrows.  It can make subsequent readings inaccurate.  Wait to ENTER BG until it is >70 and < 300.     4.  It's a good idea to calibrate the CGM before you go to bed so you are not awakened in the middle of the night to do this.     5. Record what you eat at meals and snacks with portion eaten. Record amount of carbohydrate grams in the DexCom  when you eat to help track response to food intake. Record exercise type and time. Record medications you take and the time they are taken.     6. When monitor reads that \"Sensor needs to be Changed,\" go into menu function and hit \"STOP\" (NOT shutdown)    5. If battery power is low (will see indicator on monitor), plug the  in to be charged.  It takes about an hour to fully charge.     7. Avoid taking Tylenol while wearing the DexCom, as it can cause inaccurate glucose readings for about 6 hours you take it.     7. Return all equipment and any food/exercise/medication logs to the [facility] first floor [facility].  Put everything in the envelope provided and give to one of the concierges on the first or third floor.     8. Follow-up appointment for review of sensor reports schedule for April 24 at 10:30am    "

## 2017-04-10 NOTE — PROGRESS NOTES
DIABETES EDUCATION NOTE:    Franklin Muir presents today for education related to  Type 1 diabetes  Patient is being treated with:  insulin  He is accompanied by self  Referring Provider: Steve      PATIENT CONCERNS/REASON FOR CGM PLACEMENT:    Franklin diagnosed with Type 1 diabetes in 2009.  He is having some difficulty keeping his BG under control.    Currently he is taking Lantus 11 units (just increased from 10 units a couple of days ago).  He is taking this at noon.  He takes Novolog 1/2 unit per 20 grams of CHO at meals, as well as a correction scale of 1/2 unit per 30 mg/dL over a target of 150.    He's not sure, but he thinks that his correction scale may be a little too much.  He tried it out the other day and had a low after he corrected.      He weighs his food, and thinks his carb counting is pretty accurate, but he finds eating out to be a challenge.    He works out three days a week in the AM.  Finds that he tends to go high after working out.  Lows happening after correction as noted above.   Frequent hypoglycemia, To determine overnight glucose control and Optimize MDI dosing     ASSESSMENT:    Current Diabetes Management:  YES    Lab Results:  Lab Results   Component Value Date    A1C 8.4 03/13/2017     Lab Results   Component Value Date     03/13/2017       Vitals:  There were no vitals taken for this visit.    Dexcom sensor started today. Dexcom was inserted with no resistance or bleeding at insertion site.    Pt will plan to wear the sensor for seven days.    WRITTEN AND VERBAL INSTRUCTION GIVEN:   Purpose and procedure for placing sensor explained and Franklin Muir verbalized understanding.  Sensor Lot Number: [id number]  Sensor Expiration Date: Jan 4, 2018  Transmitter ID:  [id number]   SN: [id number]    Explanation of difference between blood glucose and sensor glucose.  Insertion of sensor, technique, angle, site rotation, skin prep use, frequency of change.  Instructed to avoid  taking any acetaminophen containing products while wearing the sensor, and if unavoidable, made aware that readings that will be completely unreliable for 4 to 6 hours afterward.   Cautioned that no decisions about treatment of hypo- or hyperglycemia can be based on a sensor reading, but must be double checked with a blood glucose.   Programming settings:  Hi and low alerts, rate alerts, predictive alerts,  out-of-range alerts and fixed low alert of 55 mg/mL.   Calibration requirements:  A minimum of one BG calibration every 12 hours is needed for sensor readings to be displayed.   Instructed to never use the CGM reading displayed to calibrate the CGM.   Basic Care of transmitter and :      Patient's role in data gathering explained, including:    Need for careful food records, which include time foods eaten, carbohydrate content, insulin doses and times, and any contributing factors like stress and exercise.    Comments for any episodes of hypoglycemia    Process for removing sensor and returning to diabetes education center.      Contact information provided in case of questions or problems.      Need to check BG before each meal and at bedtime and record on food records    He was also given information about basal testing as well.  He made an appointment to follow up a week after wearing the sensor and we will discuss pump options at that time.     Provided with food records and sensor agreement signed.  IV 3000 over-tape provided if more tape necessary for adhesion.  Patient verbalizes understanding of how to remove sensor and all instructions provided.     Educational and other materials:  Food/exercise/medication log sheets  Dexcom Quickstart Guide  Contact information  Return Check List    PLAN:    See Patient Instructions, AVS printed and provided to patient today.    Follow-up:    Patient to return all items associated with professional Continuous Glucose Monitor System in person by bringing  materials to the 3rd floor  at the [facility] on return date. .  Return date: April 18 or 19    Plan to follow up with patient on CGM results:  Followup with CDE--appointment made today    Time Spent: 60 minutes  Encounter Type: Individual

## 2017-04-24 ENCOUNTER — OFFICE VISIT (OUTPATIENT)
Dept: EDUCATION SERVICES | Facility: CLINIC | Age: 47
End: 2017-04-24

## 2017-04-24 DIAGNOSIS — E10.9 DIABETES MELLITUS TYPE 1 (H): ICD-10-CM

## 2017-04-24 DIAGNOSIS — E10.649 TYPE 1 DIABETES MELLITUS WITH HYPOGLYCEMIA AND WITHOUT COMA (H): Primary | ICD-10-CM

## 2017-04-24 NOTE — MR AVS SNAPSHOT
After Visit Summary   4/24/2017    Franklin Muir    MRN: [id number]           Patient Information     Date Of Birth          1970        Visit Information        Provider Department      4/24/2017 10:30 AM Angie Zhao RN [facility]        Today's Diagnoses     Type 1 diabetes mellitus with hypoglycemia and without coma (HCC)    -  1    Diabetes mellitus type 1 (H)           Follow-ups after your visit        Your next 10 appointments already scheduled     Jul 25, 2017  1:30 PM CDT   (Arrive by 1:15 PM)   RETURN DIABETES with Stacy Wilson MD   [facility] ([facility])    [address]  [address] [facility] [address] [phone]              Who to contact     Please call your clinic at [phone] to:    Ask questions about your health    Make or cancel appointments    Discuss your medicines    Learn about your test results    Speak to your doctor   If you have compliments or concerns about an experience at your clinic, or if you wish to file a complaint, please contact [facility] Patient Relations at [phone] or email us at [email or web address]         Additional Information About Your Visit        MyChart Information     [employer] gives you secure access to your electronic health record. If you see a primary care provider, you can also send messages to your care team and make appointments. If you have questions, please call your primary care clinic.  If you do not have a primary care provider, please call [phone] and they will assist you.      [employer] is an electronic gateway that provides easy, online access to your medical records. With [employer], you can request a clinic appointment, read your test results, renew a prescription or communicate with your care team.     To access your existing account, please contact your [facility] or call  [phone] for assistance.        Care EveryWhere ID     This is your Care EveryWhere ID. This could be used by other organizations to access your [place] medical records  [id number]         Blood Pressure from Last 3 Encounters:   03/21/17 118/79   03/13/17 106/74   09/21/16 112/67    Weight from Last 3 Encounters:   03/21/17 77.8 kg (171 lb 8 oz)   09/21/16 78.5 kg (173 lb)   08/15/16 75.3 kg (166 lb)              Today, you had the following     No orders found for display         Today's Medication Changes          These changes are accurate as of: 4/24/17 11:59 PM.  If you have any questions, ask your nurse or doctor.               These medicines have changed or have updated prescriptions.        Dose/Directions    insulin glargine 100 UNIT/ML injection   Commonly known as:  LANTUS SOLOSTAR   This may have changed:  additional instructions   Used for:  Diabetes mellitus type 1 (H)        Inject Subcutaneous. Inject 11 units daily   Quantity:  15 mL   Refills:  3            Where to get your medicines      These medications were sent to [id number], [place] - [address]  [address] [address]     Phone:  [phone]     insulin glargine 100 UNIT/ML injection                Primary Care Provider Office Phone # Fax #    Ruddy Malave [phone]       [address] [address]        Thank you!     Thank you for choosing [facility]  for your care. Our goal is always to provide you with excellent care. Hearing back from our patients is one way we can continue to improve our services. Please take a few minutes to complete the written survey that you may receive in the mail after your visit with us. Thank you!             Your Updated Medication List - Protect others around you: Learn how to safely use, store and throw away your medicines at www.disposemymeds.org.          This  "list is accurate as of: 4/24/17 11:59 PM.  Always use your most recent med list.                   Brand Name Dispense Instructions for use    ACE/ARB NOT PRESCRIBED (INTENTIONAL)      by Other route continuous prn.       aspirin 81 MG tablet     100    ONE DAILY       atorvastatin 10 MG tablet    LIPITOR    90 tablet    Take 1 tablet (10 mg) by mouth daily       blood glucose monitoring meter device kit      Use daily       blood glucose test strip    ESTEVAN BREEZE 2    700 each    Test 7 times daily       CLINPRO 5000 1.1 % Pste   Generic drug:  Sodium Fluoride      Apply  to affected area. Use once every night       glucagon 1 MG kit    GLUCAGON EMERGENCY    2 each    Use as directed       Injection Device for insulin Carmen    NOVOPEN JR (GREEN)    1 each    Use as directed with insulin       insulin glargine 100 UNIT/ML injection    LANTUS SOLOSTAR    15 mL    Inject Subcutaneous. Inject 11 units daily       loratadine 10 MG tablet    CLARITIN         NovoLOG PENFILL 100 UNIT/ML injection   Generic drug:  insulin aspart     30 mL    Use with Novopen carb counting with meals and using about 20 units per day       * pen needles 5/16\" 31G X 8 MM Misc     360 each    Use four times daily or as directed.       * insulin pen needle 31G X 5 MM    B-D U/F    450 each    Use 5 daily or as directed.       * Notice:  This list has 2 medication(s) that are the same as other medications prescribed for you. Read the directions carefully, and ask your doctor or other care provider to review them with you.      "

## 2017-04-25 NOTE — PROGRESS NOTES
Dexcom Professional Continuous Glucose Monitor Interpretation     Patient History:   1. Type of Diabetes: Type 1 diabetes  2. Duration of diabetes or year of diagnosis:   3. Current treatment regimen (include all diabetes medications, dose & dosing frequency/timing): Injectable Medications: Lantus 11 units taken at 12noon daily and NovoLog Insulin 1 unit per 20 grams CHO and a correction scale of 1 unit per 60 mg/dL over 150.    4. Most Recent A1c Result:    Lab Results   Component Value Date    A1C 8.4 2017     5. Indication/s for Dex com study: Unexplained fluctuations in glucose values and A1C higher than expected.  Also wanted to uncover nocturnal glycemic control.    Statistics:   1. Total number of sensor values is adequate.  The test is not optimal if <50% of the readings are available per day.  2. Number of meter values and paired readings is adequate.  Less than 2 (q 12 hours) paired readings per day may be associated with a less reliable test.  3. Standard deviation is: 60.  Standard deviation (SD) indicates how variable sensor readings are.  Patients who do not have diabetes may have a SD around 20, while someone with suboptimal diabetes control may have a SD of 60 or more.  4. Glucose excursions: Following exercise, consistently.  Also has some excursions following some meals, but this is not consistent.     Target in this report is defined as     Percent in target is: 34%  Percent above target is: 62%  Percent below target is: 4%                                  Data evaluation:   Sensor modal day evaluation shows the followin. Average blood sugar: 162 mg/dL.  2. The overnight ranges from  mg/dL.   Patient's Logbook shows the following:   Carbohydrate counting is: accurate but he tends to round his carbs up or down.  Medication and/or insulin dosing is: accurate and consistent    Assessment and Plan:  Franklin did some basal testing while he was wearing the CGM, and it would  appear, based on a very flat trend of about 120 or so overnight (when he didn't eat or correct before bedtime), that his current Lantus dose of 11 units is correct.   He has a tendency to spike a little following his morning coffee (he has not been dosing for the creamer he uses), so he is going to try giving himself 1/2 unit for his morning coffee.  He has a tendency to spike pretty high (up to 300) immediately following breakfast when he eats cereal.  This doesn't happen after his other breakfasts.  He is going to try dosing his insulin about 10-15 minutes before breakfast when he knows he is going to eat cereal to see if this blunts his post meal rise a little.   In regard to exercise, he appears to rise immediately after an intense workout but falls later in the day.  When he is doing a long day or yardwork or prolonged low-level aerobic exercise, he tends to go low during the activity.  He is going to try doing a half-unit correction prior to his intense workouts, but then back off on his meal dosing for the evening meal, as this is when he tends to go low.  He is also overtreating his lows, and he recognizes this, so he will try to limit his treatment of hypoglycemia to 15 grams CHO if between 50 and 70 and 30 grams if he is under 50.    Further discussion about whether or not he feels that having a CGM of his own would be helpful in managing his diabetes.  He states that wearing one for a week was very helpful in terms of helping him understand what happens with his BG with activity and with certain foods.  Prior to this he had been giving all of his Novolog doses after his meals, and he noticed a big change in his post prandial readings when he changed his practice to bolusing prior to eating.  He also was surprised that wearing the CGM was less annoying than he thought it would be.  He would like to think about this.  Gave him the contact information for the Dexcom rep in case he wants to pursue and  explained the process.      He seems a little more interested in using an insulin pump, but feels like he would need to think about the pros and cons of this a little harder.  We did discussed some of the benefits he might get from wearing a pump, particularly around exercise, but this is a big step and he wants to think about it.      Follow up with Dr. Wilson as previously scheduled.      Time Spent: 60 minutes  Any diabetes medication dose changes were made via the CDE Protocol and Collaborative Practice Agreement with the patient's referring provider. A copy of this encounter was shared with the provider.

## 2017-05-10 DIAGNOSIS — E10.9 DIABETES MELLITUS TYPE 1 (H): ICD-10-CM

## 2017-05-12 DIAGNOSIS — E10.9 DIABETES MELLITUS TYPE 1 (H): ICD-10-CM

## 2017-05-31 DIAGNOSIS — E78.2 MIXED HYPERLIPIDEMIA: ICD-10-CM

## 2017-05-31 RX ORDER — ATORVASTATIN CALCIUM 10 MG/1
10 TABLET, FILM COATED ORAL DAILY
Qty: 90 TABLET | Refills: 3 | Status: SHIPPED | OUTPATIENT
Start: 2017-05-31 | End: 2018-06-08

## 2017-05-31 NOTE — TELEPHONE ENCOUNTER
atorvastatin  Last Written Prescription Date:  6/1/16  Last Fill Quantity: 90,   # refills: 3  Last Office Visit : 3/21/17  Future Office visit:  7/10/17

## 2017-06-13 ENCOUNTER — TELEPHONE (OUTPATIENT)
Dept: ENDOCRINOLOGY | Facility: CLINIC | Age: 47
End: 2017-06-13

## 2017-06-13 DIAGNOSIS — E10.9 TYPE 1 DIABETES MELLITUS WITHOUT COMPLICATION (H): ICD-10-CM

## 2017-06-13 DIAGNOSIS — E10.9 DIABETES MELLITUS TYPE 1 (H): ICD-10-CM

## 2017-06-26 ASSESSMENT — ENCOUNTER SYMPTOMS
JAUNDICE: 0
EYE IRRITATION: 1
EYE REDNESS: 0
BLOATING: 0
ABDOMINAL PAIN: 0
NECK PAIN: 0
BLOOD IN STOOL: 0
EYE WATERING: 0
HEARTBURN: 0
RECTAL BLEEDING: 0
MYALGIAS: 0
DOUBLE VISION: 0
NAUSEA: 0
BOWEL INCONTINENCE: 0
MUSCLE WEAKNESS: 0
DIARRHEA: 0
MUSCLE CRAMPS: 0
ARTHRALGIAS: 0
JOINT SWELLING: 0
EYE PAIN: 0
BACK PAIN: 1
CONSTIPATION: 0
RECTAL PAIN: 0
VOMITING: 0
STIFFNESS: 0

## 2017-07-10 ENCOUNTER — OFFICE VISIT (OUTPATIENT)
Dept: ENDOCRINOLOGY | Facility: CLINIC | Age: 47
End: 2017-07-10

## 2017-07-10 VITALS
BODY MASS INDEX: 25.05 KG/M2 | WEIGHT: 175 LBS | HEIGHT: 70 IN | DIASTOLIC BLOOD PRESSURE: 79 MMHG | HEART RATE: 64 BPM | SYSTOLIC BLOOD PRESSURE: 123 MMHG

## 2017-07-10 DIAGNOSIS — E10.65 TYPE 1 DIABETES MELLITUS WITH HYPERGLYCEMIA (H): Primary | ICD-10-CM

## 2017-07-10 LAB — HBA1C MFR BLD: 6.9 % (ref 4.3–6)

## 2017-07-10 ASSESSMENT — PAIN SCALES - GENERAL: PAINLEVEL: NO PAIN (0)

## 2017-07-10 NOTE — PATIENT INSTRUCTIONS
Reduce Lantus to 10 units for the summer    Try doing a cool down after exercise and consider 1/2 correction post exercise to prevent lows.      Consider accu-chek expert meter for dosing calculations    Send me a [employer] message if you would like to wear sensor prior to next appointment.      Send message if you need help with adjustments.

## 2017-07-10 NOTE — LETTER
7/10/2017       RE: Franklin Muir  [address]  [place] [address]     Dear Colleague,    Thank you for referring your patient, Franklin Muir, to the [facility] at [facility]. Please see a copy of my visit note below.    HPI:   Franklin is a 48 yo man here for follow up of type 1 diabetes.  He recently established care with Dr. Wilson after Dr. Swenson left.  He reports that things have been going well, but he might be having too many low blood sugars.   He is currently on Lantus to 11 units daily at 7 AM and Novolog 1 unit/ 20 grams of CHO with meals and snacks, and for pre-meal correction @ 1/2 U per 30 mg/dL above 200.     Download from his meter shows patient is checking his blood glucose 2.8 times per day, average is 158 mg/dl.  He has had some lows post-exercise.   He does have strong symptoms alerting him to hypoglycemia.  His brother also had type 1 diabetes and  of DKA.    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  He does cardio and weight lifting, basketball, mostly in the mid-day.  Schedule may change a bit for exercise in the fall, probably a bit less.      He also has a history of Hashimoto's ab positivity with normal function, has never been on levothyroxine replacement therapy. He generally has been feeling well and has no concerns today.     ROS   GENERAL: no weight loss, weight gain, fevers, chills, malaise, night sweats.   HEENT: no dysphagia, diplopia, neck pain or tenderness, dry/scratchy eyes, URI, cough, sinus drainage, tinnitus, sinus pressure  CV: no chest pain, pressure, palpitations, skipped beats, LOC  LUNGS: no SOB, FOUNTAIN, cough, sputum production, wheezing   GI: no diarrhea, constipation, abdominal pain  EXTREMITIES: no rashes, ulcers, edema  NEUROLOGY: no changes in vision, tingling or  numbness in hands or feet.   MSK: no muscle aches or pains, weakness  PSYCH: mood stable    Past Medical History:   Diagnosis Date     Chronic lymphocytic thyroiditis 7/20/2011     Chronic lymphocytic thyroiditis 7/20/2011     Depressive disorder 06/01/1997    Treated for a couple of years; not currently experiencing     Diabetes 6/23/2009       Past Surgical History:   Procedure Laterality Date     HC TOOTH EXTRACTION W/FORCEP         Family History   Problem Relation Age of Onset     DIABETES Brother      Asthma No family hx of      C.A.D. No family hx of      Hypertension Paternal Grandfather      Hypertension Maternal Grandfather      CEREBROVASCULAR DISEASE No family hx of      Breast Cancer Maternal Aunt      Cancer - colorectal No family hx of      Prostate Cancer No family hx of      DIABETES Brother      Coronary Artery Disease Other      Maternal great grandfather     Hypertension Maternal Grandmother      Hyperlipidemia Mother      Hyperlipidemia Maternal Grandmother      Hyperlipidemia Maternal Grandfather      Hyperlipidemia Brother      CEREBROVASCULAR DISEASE Maternal Grandfather      CEREBROVASCULAR DISEASE Paternal Grandmother      CEREBROVASCULAR DISEASE Paternal Grandfather      Anxiety Disorder Brother        Social History     Social History     Marital status:      Spouse name: N/A     Number of children: N/A     Years of education: N/A     Social History Main Topics     Smoking status: Never Smoker     Smokeless tobacco: Never Used     Alcohol use Yes      Comment: ~ 1 drink/day     Drug use: No     Sexual activity: Yes     Partners: Female     Birth control/ protection: Condom, Natural Family Planning      Comment: Would like to talk about vasectomy     Other Topics Concern     Parent/Sibling W/ Cabg, Mi Or Angioplasty Before 65f 55m? No     Social History Narrative   Social Hx:   .  Has a young son.  Works a  at the [employer].  Currently on  "sabbatical. Physically active.    Current Outpatient Prescriptions   Medication     blood glucose (ESTEVAN BREEZE 2) test strip     insulin pen needle (B-D U/F) 31G X 5 MM     atorvastatin (LIPITOR) 10 MG tablet     insulin glargine (LANTUS SOLOSTAR) 100 UNIT/ML injection     NOVOLOG PENFILL 100 UNIT/ML soln     glucagon (GLUCAGON EMERGENCY) 1 MG injection     loratadine (CLARITIN) 10 MG tablet     Injection Device for insulin (NOVOPEN JR, JAIDEN,) DUSTIN     Sodium Fluoride (CLINPRO 5000) 1.1 % PSTE     Blood Glucose Monitoring Suppl (Espial GroupEZE 2 SYSTEM) W/DEVICE KIT     ACE/ARB NOT PRESCRIBED, INTENTIONAL,     ASPIRIN 81 MG OR TABS     No current facility-administered medications for this visit.           Allergies   Allergen Reactions     Ceftin Rash     Ampicillin Rash       Physical Exam  /79  Pulse 64  Ht 1.784 m (5' 10.25\")  Wt 79.4 kg (175 lb)  BMI 24.93 kg/m2  GENERAL:  Alert and oriented X3, NAD, well dressed, answering questions appropriately, appears stated age.  EXTREMITIES: no edema, +pulses, no rashes, no lesions    RESULTS    Lab Results   Component Value Date    A1C 8.4 (H) 03/13/2017    A1C 7.6 (H) 02/18/2016    A1C 7.8 (H) 07/07/2015    A1C 6.9 (H) 12/03/2013    A1C 6.9 (A) 09/25/2013       Hemoglobin A1C   Date Value Ref Range Status   03/13/2017 8.4 (H) 4.3 - 6.0 % Final   02/18/2016 7.6 (H) 4.3 - 6.0 % Final   07/07/2015 7.8 (H) 4.3 - 6.0 % Final   12/03/2013 6.9 (H) 4.3 - 6.0 % Final   09/25/2013 6.9 (A) 4.3 - 6.0 % Final       TSH   Date Value Ref Range Status   03/13/2017 2.81 0.40 - 4.00 mU/L Final   02/18/2016 3.26 0.40 - 4.00 mU/L Final   07/07/2015 1.70 0.40 - 4.00 mU/L Final   01/05/2015 1.71 0.40 - 4.00 mU/L Final     Comment:     Effective 7/30/2014, the reference range for this assay has changed to reflect   new instrumentation/methodology.     12/03/2013 1.94 0.4 - 5.0 mU/L Final     T4 Total   Date Value Ref Range Status   09/28/2010 9.0 5.0 - 11.0 ug/dL Final     T4 " Free   Date Value Ref Range Status   02/18/2016 1.06 0.76 - 1.46 ng/dL Final   07/07/2015 1.04 0.76 - 1.46 ng/dL Final   01/05/2015 1.02 0.76 - 1.46 ng/dL Final     Comment:     Effective 7/30/2014, the reference range for this assay has changed to reflect   new instrumentation/methodology.     12/03/2013 1.08 0.70 - 1.85 ng/dL Final   01/26/2011 1.10 0.70 - 1.85 ng/dL Final       Creatinine   Date Value Ref Range Status   03/13/2017 0.89 0.66 - 1.25 mg/dL Final   02/18/2016 0.91 0.66 - 1.25 mg/dL Final       Potassium   Date Value Ref Range Status   03/13/2017 4.3 3.4 - 5.3 mmol/L Final   02/18/2016 4.2 3.4 - 5.3 mmol/L Final       No results found for: MICROALBUMIN    ALT   Date Value Ref Range Status   03/13/2017 24 0 - 70 U/L Final   02/18/2016 24 0 - 70 U/L Final       Recent Labs   Lab Test  03/13/17   0851  02/18/16   0837  01/05/15   0933  12/03/13   0907   CHOL  106  120  134  163   HDL  51  56  47  43   LDL  46  56  76  108   TRIG  46  40  57  57   CHOLHDLRATIO   --    --   2.9  3.8           Assessment/Plan:     1.  Type 1 diabetes- Overall, very well controlled, but having too much hypoglycemia.  A1c is down from 8.4% to 6.9%, the lowest it has been in years.  He is much more physically active this summer and requires less insulin.  We did discuss the importance of doing a cool down after exercise, cutting back correction to 50% post-exercise and he will also reduce his Lantus to 10 units for the summer.  He will let me know if he would like to wear another diagnostic sensor for the week prior to his next appointment with Dr. Wilson.      2.  Risk factors-     Retinopathy:  No.  Had eye exam within last year.   Nephropathy:  BP well controlled. No microalbuminuria.  Creatinine stable.   Neuropathy: No.    Feet: OK, no ulcers.   Lipids:  LDL at target.  Patient taking statin    3.  F/U in 3 months as planned with Dr. Wilson.  I will see him in 6 months, sooner with concerns.    I spent 30 minutes with  this patient face to face and explained the conditions and plans (more than 50% of time was counseling/coordination of care, diabetes management, follow up plan for worsening hyper and hypoglycemia) . The patient understood and is satisfied with today's visit.      Liz Conroy PA-C, MPAS   [facility]  Department of Medicine  Division of Endocrinology and Diabetes

## 2017-07-10 NOTE — PROGRESS NOTES
HPI:   Franklin is a 48 yo man here for follow up of type 1 diabetes.  He recently established care with Dr. Wilson after Dr. Swenson left.  He reports that things have been going well, but he might be having too many low blood sugars.   He is currently on Lantus to 11 units daily at 7 AM and Novolog 1 unit/ 20 grams of CHO with meals and snacks, and for pre-meal correction @ 1/2 U per 30 mg/dL above 200.     Download from his meter shows patient is checking his blood glucose 2.8 times per day, average is 158 mg/dl.  He has had some lows post-exercise.   He does have strong symptoms alerting him to hypoglycemia.  His brother also had type 1 diabetes and  of DKA.    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  He does cardio and weight lifting, basketball, mostly in the mid-day.  Schedule may change a bit for exercise in the fall, probably a bit less.      He also has a history of Hashimoto's ab positivity with normal function, has never been on levothyroxine replacement therapy. He generally has been feeling well and has no concerns today.     ROS   GENERAL: no weight loss, weight gain, fevers, chills, malaise, night sweats.   HEENT: no dysphagia, diplopia, neck pain or tenderness, dry/scratchy eyes, URI, cough, sinus drainage, tinnitus, sinus pressure  CV: no chest pain, pressure, palpitations, skipped beats, LOC  LUNGS: no SOB, FOUNTAIN, cough, sputum production, wheezing   GI: no diarrhea, constipation, abdominal pain  EXTREMITIES: no rashes, ulcers, edema  NEUROLOGY: no changes in vision, tingling or numbness in hands or feet.   MSK: no muscle aches or pains, weakness  PSYCH: mood stable    Past Medical History:   Diagnosis Date     Chronic lymphocytic thyroiditis 2011     Chronic lymphocytic thyroiditis 2011     Depressive disorder 1997    Treated for a couple of  years; not currently experiencing     Diabetes 6/23/2009       Past Surgical History:   Procedure Laterality Date     HC TOOTH EXTRACTION W/FORCEP         Family History   Problem Relation Age of Onset     DIABETES Brother      Asthma No family hx of      C.A.D. No family hx of      Hypertension Paternal Grandfather      Hypertension Maternal Grandfather      CEREBROVASCULAR DISEASE No family hx of      Breast Cancer Maternal Aunt      Cancer - colorectal No family hx of      Prostate Cancer No family hx of      DIABETES Brother      Coronary Artery Disease Other      Maternal great grandfather     Hypertension Maternal Grandmother      Hyperlipidemia Mother      Hyperlipidemia Maternal Grandmother      Hyperlipidemia Maternal Grandfather      Hyperlipidemia Brother      CEREBROVASCULAR DISEASE Maternal Grandfather      CEREBROVASCULAR DISEASE Paternal Grandmother      CEREBROVASCULAR DISEASE Paternal Grandfather      Anxiety Disorder Brother        Social History     Social History     Marital status:      Spouse name: N/A     Number of children: N/A     Years of education: N/A     Social History Main Topics     Smoking status: Never Smoker     Smokeless tobacco: Never Used     Alcohol use Yes      Comment: ~ 1 drink/day     Drug use: No     Sexual activity: Yes     Partners: Female     Birth control/ protection: Condom, Natural Family Planning      Comment: Would like to talk about vasectomy     Other Topics Concern     Parent/Sibling W/ Cabg, Mi Or Angioplasty Before 65f 55m? No     Social History Narrative   Social Hx:   .  Has a young son.  Works a  at the [employer].  Currently on sabA [place]. Physically active.    Current Outpatient Prescriptions   Medication     blood glucose (ESTEVAN BREEZE 2) test strip     insulin pen needle (B-D U/F) 31G X 5 MM     atorvastatin (LIPITOR) 10 MG tablet     insulin glargine (LANTUS SOLOSTAR) 100 UNIT/ML injection     NOVOLOG PENFILL  "100 UNIT/ML soln     glucagon (GLUCAGON EMERGENCY) 1 MG injection     loratadine (CLARITIN) 10 MG tablet     Injection Device for insulin (NOVOPEN JR, JAIDEN,) DUSTIN     Sodium Fluoride (CLINPRO 5000) 1.1 % PSTE     Blood Glucose Monitoring Suppl (Lucidity (MemberRx)E 2 SYSTEM) W/DEVICE KIT     ACE/ARB NOT PRESCRIBED, INTENTIONAL,     ASPIRIN 81 MG OR TABS     No current facility-administered medications for this visit.           Allergies   Allergen Reactions     Ceftin Rash     Ampicillin Rash       Physical Exam  /79  Pulse 64  Ht 1.784 m (5' 10.25\")  Wt 79.4 kg (175 lb)  BMI 24.93 kg/m2  GENERAL:  Alert and oriented X3, NAD, well dressed, answering questions appropriately, appears stated age.  EXTREMITIES: no edema, +pulses, no rashes, no lesions    RESULTS    Lab Results   Component Value Date    A1C 8.4 (H) 03/13/2017    A1C 7.6 (H) 02/18/2016    A1C 7.8 (H) 07/07/2015    A1C 6.9 (H) 12/03/2013    A1C 6.9 (A) 09/25/2013       Hemoglobin A1C   Date Value Ref Range Status   03/13/2017 8.4 (H) 4.3 - 6.0 % Final   02/18/2016 7.6 (H) 4.3 - 6.0 % Final   07/07/2015 7.8 (H) 4.3 - 6.0 % Final   12/03/2013 6.9 (H) 4.3 - 6.0 % Final   09/25/2013 6.9 (A) 4.3 - 6.0 % Final       TSH   Date Value Ref Range Status   03/13/2017 2.81 0.40 - 4.00 mU/L Final   02/18/2016 3.26 0.40 - 4.00 mU/L Final   07/07/2015 1.70 0.40 - 4.00 mU/L Final   01/05/2015 1.71 0.40 - 4.00 mU/L Final     Comment:     Effective 7/30/2014, the reference range for this assay has changed to reflect   new instrumentation/methodology.     12/03/2013 1.94 0.4 - 5.0 mU/L Final     T4 Total   Date Value Ref Range Status   09/28/2010 9.0 5.0 - 11.0 ug/dL Final     T4 Free   Date Value Ref Range Status   02/18/2016 1.06 0.76 - 1.46 ng/dL Final   07/07/2015 1.04 0.76 - 1.46 ng/dL Final   01/05/2015 1.02 0.76 - 1.46 ng/dL Final     Comment:     Effective 7/30/2014, the reference range for this assay has changed to reflect   new instrumentation/methodology.   "   12/03/2013 1.08 0.70 - 1.85 ng/dL Final   01/26/2011 1.10 0.70 - 1.85 ng/dL Final       Creatinine   Date Value Ref Range Status   03/13/2017 0.89 0.66 - 1.25 mg/dL Final   02/18/2016 0.91 0.66 - 1.25 mg/dL Final       Potassium   Date Value Ref Range Status   03/13/2017 4.3 3.4 - 5.3 mmol/L Final   02/18/2016 4.2 3.4 - 5.3 mmol/L Final       No results found for: MICROALBUMIN    ALT   Date Value Ref Range Status   03/13/2017 24 0 - 70 U/L Final   02/18/2016 24 0 - 70 U/L Final       Recent Labs   Lab Test  03/13/17   0851  02/18/16   0837  01/05/15   0933  12/03/13   0907   CHOL  106  120  134  163   HDL  51  56  47  43   LDL  46  56  76  108   TRIG  46  40  57  57   CHOLHDLRATIO   --    --   2.9  3.8           Assessment/Plan:     1.  Type 1 diabetes- Overall, very well controlled, but having too much hypoglycemia.  A1c is down from 8.4% to 6.9%, the lowest it has been in years.  He is much more physically active this summer and requires less insulin.  We did discuss the importance of doing a cool down after exercise, cutting back correction to 50% post-exercise and he will also reduce his Lantus to 10 units for the summer.  He will let me know if he would like to wear another diagnostic sensor for the week prior to his next appointment with Dr. Wilson.      2.  Risk factors-     Retinopathy:  No.  Had eye exam within last year.   Nephropathy:  BP well controlled. No microalbuminuria.  Creatinine stable.   Neuropathy: No.    Feet: OK, no ulcers.   Lipids:  LDL at target.  Patient taking statin    3.  F/U in 3 months as planned with Dr. Wilson.  I will see him in 6 months, sooner with concerns.    I spent 30 minutes with this patient face to face and explained the conditions and plans (more than 50% of time was counseling/coordination of care, diabetes management, follow up plan for worsening hyper and hypoglycemia) . The patient understood and is satisfied with today's visit.      Liz Conroy PA-C, MPAS    [facility]  Department of Medicine  Division of Endocrinology and Diabetes

## 2017-07-10 NOTE — MR AVS SNAPSHOT
After Visit Summary   7/10/2017    Franklin Muir    MRN: [id number]           Patient Information     Date Of Birth          1970        Visit Information        Provider Department      7/10/2017 7:00 AM Liz Conroy PA-C [facility]        Care Instructions    Reduce Lantus to 10 units for the summer    Try doing a cool down after exercise and consider 1/2 correction post exercise to prevent lows.      Consider accu-chek expert meter for dosing calculations    Send me a [employer] message if you would like to wear sensor prior to next appointment.      Send message if you need help with adjustments.           Follow-ups after your visit        Your next 10 appointments already scheduled     Oct 31, 2017 10:00 AM CDT   (Arrive by 9:45 AM)   RETURN DIABETES with MD CECILY Ovalles [facility] ([facility])    [address] [place] [id number] [phone]              Who to contact     Please call your clinic at [phone] to:    Ask questions about your health    Make or cancel appointments    Discuss your medicines    Learn about your test results    Speak to your doctor   If you have compliments or concerns about an experience at your clinic, or if you wish to file a complaint, please contact [facility] Patient Relations at [phone] or email us at [email or web address]         Additional Information About Your Visit        MyChart Information     Assembla gives you secure access to your electronic health record. If you see a primary care provider, you can also send messages to your care team and make appointments. If you have questions, please call your primary care clinic.  If you do not have a primary care provider, please call [phone] and they will assist you.      Assembla is an electronic gateway that provides easy, online access to your medical records.  "With MyChart, you can request a clinic appointment, read your test results, renew a prescription or communicate with your care team.     To access your existing account, please contact your [facility] or call [phone] for assistance.        Care EveryWhere ID     This is your Care EveryWhere ID. This could be used by other organizations to access your [place] medical records  [id number]        Your Vitals Were     Pulse Height BMI (Body Mass Index)             64 1.784 m (5' 10.25\") 24.93 kg/m2          Blood Pressure from Last 3 Encounters:   07/10/17 123/79   03/21/17 118/79   03/13/17 106/74    Weight from Last 3 Encounters:   07/10/17 79.4 kg (175 lb)   03/21/17 77.8 kg (171 lb 8 oz)   09/21/16 78.5 kg (173 lb)              Today, you had the following     No orders found for display       Primary Care Provider Office Phone # Fax #    Ruddy Malave [phone]       [address] [address]        Equal Access to Services     GIULIANO MAHMOOD : Hadii aad ku hadasho Soomaali, waaxda luqadaha, qaybta kaalmada adeegyada, celestine palomino haymicki rogers . So [facility] [phone].    ATENCIÓN: Si habla español, tiene a pedro disposición servicios gratuitos de asistencia lingüística. [facility] [phone].    We comply with applicable federal civil rights laws and [place] laws. We do not discriminate on the basis of race, color, national origin, age, disability sex, sexual orientation or gender identity.            Thank you!     Thank you for choosing [facility]  for your care. Our goal is always to provide you with excellent care. Hearing back from our patients is one way we can continue to improve our services. Please take a few minutes to complete the written survey that you may receive in the mail after your visit with us. Thank you!             Your Updated Medication List - Protect others " around you: Learn how to safely use, store and throw away your medicines at www.disposemymeds.org.          This list is accurate as of: 7/10/17  7:55 AM.  Always use your most recent med list.                   Brand Name Dispense Instructions for use Diagnosis    ACE/ARB NOT PRESCRIBED (INTENTIONAL)      by Other route continuous prn.        aspirin 81 MG tablet     100    ONE DAILY        atorvastatin 10 MG tablet    LIPITOR    90 tablet    Take 1 tablet (10 mg) by mouth daily    Mixed hyperlipidemia       blood glucose monitoring meter device kit      Use daily        blood glucose test strip    ESTEVAN BREEZE 2    450 each    Test 5 times daily    Diabetes mellitus type 1 (H)       CLINPRO 5000 1.1 % Pste   Generic drug:  Sodium Fluoride      Apply  to affected area. Use once every night        glucagon 1 MG kit    GLUCAGON EMERGENCY    2 each    Use as directed    Type 1 diabetes mellitus without complication (H)       Injection Device for insulin Carmen    NOVOPEN JR (GREEN)    1 each    Use as directed with insulin    Diabetes mellitus, type 1       insulin glargine 100 UNIT/ML injection    LANTUS SOLOSTAR    15 mL    Inject Subcutaneous. Inject 11 units daily    Diabetes mellitus type 1 (H)       insulin pen needle 31G X 5 MM    B-D U/F    450 each    Use 5 daily or as directed.    Type 1 diabetes mellitus without complication (H)       loratadine 10 MG tablet    CLARITIN          NovoLOG PENFILL 100 UNIT/ML injection   Generic drug:  insulin aspart     30 mL    Use with Novopen carb counting with meals and using about 20 units per day    Diabetes mellitus type 1 (H)

## 2017-07-21 ENCOUNTER — OFFICE VISIT (OUTPATIENT)
Dept: FAMILY MEDICINE | Facility: CLINIC | Age: 47
End: 2017-07-21
Payer: COMMERCIAL

## 2017-07-21 VITALS
BODY MASS INDEX: 24.77 KG/M2 | HEART RATE: 68 BPM | SYSTOLIC BLOOD PRESSURE: 126 MMHG | HEIGHT: 70 IN | WEIGHT: 173 LBS | DIASTOLIC BLOOD PRESSURE: 74 MMHG | TEMPERATURE: 98.8 F

## 2017-07-21 DIAGNOSIS — Z30.9 ENCOUNTER FOR CONTRACEPTIVE MANAGEMENT, UNSPECIFIED TYPE: ICD-10-CM

## 2017-07-21 DIAGNOSIS — N50.819 TESTICULAR PAIN: ICD-10-CM

## 2017-07-21 DIAGNOSIS — R20.2 PARESTHESIA OF ARM: Primary | ICD-10-CM

## 2017-07-21 PROCEDURE — 99214 OFFICE O/P EST MOD 30 MIN: CPT | Performed by: PHYSICIAN ASSISTANT

## 2017-07-21 NOTE — PROGRESS NOTES
"  SUBJECTIVE:                                                    Franklin Muir is a 47 year old male who presents to clinic today for the following health issues:    Concern -Testicular pain   Onset: Worsening over the past 1 month     Description:   Dull ache in the testicles. HE has not noticed any lumps     Intensity: moderate    Progression of Symptoms:  worsening    Accompanying Signs & Symptoms:  None     Previous history of similar problem:   Yes-has had this pain for a while but it has worsened over the past month.     Precipitating factors:   Worsened by: Nothing     Alleviating factors:  Improved by: Nothing     NO other  symptoms per se, sexual function is fine, urinary function is fine - not made worse with jogging / activity / - mild / dull, achy pain. No dysuria or urinary hesitancy     Therapies Tried and outcome: None      Concern - Hand Tingling   Onset: 1 month     Description:   Tingling and achy when hand makes a fist. Worse in the morning     Intensity: mild    Progression of Symptoms:  worsening    Accompanying Signs & Symptoms:  None     Previous history of similar problem:   None     Precipitating factors:   Worsened by: Making a fist     Alleviating factors:  Improved by: Nothing     Therapies Tried and outcome: None     A bit of joint pain - feels like \"falling asleep\" - no weakness, no pain. He denies neck or upper arm issues.    He is requesting re-referral to Urology for vasectomy     Problem list and histories reviewed & adjusted, as indicated.  Additional history: as documented    Labs reviewed in EPIC    Reviewed and updated as needed this visit by clinical staff       Reviewed and updated as needed this visit by Provider         ROS:  Constitutional, HEENT, cardiovascular, pulmonary, GI, , musculoskeletal, neuro, skin, endocrine and psych systems are negative, except as otherwise noted.      OBJECTIVE:   /74 (BP Location: Right arm, Cuff Size: Adult Regular)  Pulse 68  " "Temp 98.8  F (37.1  C) (Oral)  Ht 5' 10.25\" (1.784 m)  Wt 173 lb (78.5 kg)  BMI 24.65 kg/m2  Body mass index is 24.65 kg/(m^2).  GENERAL: healthy, alert and no distress   (male): mild left epididymal pain - but not significant, otherwise normal male genitalia without lesions or urethral discharge, no hernia  MS: no gross musculoskeletal defects noted, no edema, negative Tinel / Phalen /   NEURO: Normal strength and tone, mentation intact and speech normal      ASSESSMENT/PLAN:     (R20.2) Paresthesia of arm  (primary encounter diagnosis)  Comment: Suggestive of carpal tunnel - exam benign   Plan: Reviewed options - wants to do some home therapies and follow up if issues. 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 Recommend NSAID, rest, avoid tight briefs and recommend US. He agrees.     (Z30.9) Encounter for contraceptive management, unspecified type  Comment:   Plan: UROLOGY ADULT REFERRAL        Refer for vasectomy     JIMENA HARRY PA-C  [facility]    "

## 2017-07-21 NOTE — PATIENT INSTRUCTIONS
1. Carpal tunnel exercises for home (Youtube)  2. Pelvic floor stretches for home - (Youtube)  3. US of testicle:  [facility] Scheduling Phone Number: [phone]  Or   [facility] Scheduling Phone number: [phone]  4. 1 to 2 weeks of anti-inflammatory - naproxen (aleve) 1 to 2 pills twice daily for a trial

## 2017-07-21 NOTE — NURSING NOTE
"Chief Complaint   Patient presents with     Testicular/scrotal Pain     Hand Problem     tingling in hands        Initial /74 (BP Location: Right arm, Cuff Size: Adult Regular)  Pulse 68  Temp 98.8  F (37.1  C) (Oral)  Ht 5' 10.25\" (1.784 m)  Wt 173 lb (78.5 kg)  BMI 24.65 kg/m2 Estimated body mass index is 24.65 kg/(m^2) as calculated from the following:    Height as of this encounter: 5' 10.25\" (1.784 m).    Weight as of this encounter: 173 lb (78.5 kg).  Medication Reconciliation: complete     Chelsi Husain CMA (AAMA)      "

## 2017-08-14 ENCOUNTER — MYC MEDICAL ADVICE (OUTPATIENT)
Dept: FAMILY MEDICINE | Facility: CLINIC | Age: 47
End: 2017-08-14

## 2017-08-16 ENCOUNTER — RADIANT APPOINTMENT (OUTPATIENT)
Dept: ULTRASOUND IMAGING | Facility: CLINIC | Age: 47
End: 2017-08-16
Attending: PHYSICIAN ASSISTANT
Payer: COMMERCIAL

## 2017-08-16 DIAGNOSIS — N50.819 TESTICULAR PAIN: ICD-10-CM

## 2017-08-16 PROCEDURE — 76870 US EXAM SCROTUM: CPT

## 2017-10-31 ENCOUNTER — OFFICE VISIT (OUTPATIENT)
Dept: ENDOCRINOLOGY | Facility: CLINIC | Age: 47
End: 2017-10-31

## 2017-10-31 VITALS
HEART RATE: 68 BPM | SYSTOLIC BLOOD PRESSURE: 120 MMHG | BODY MASS INDEX: 25.2 KG/M2 | WEIGHT: 176 LBS | DIASTOLIC BLOOD PRESSURE: 76 MMHG | HEIGHT: 70 IN

## 2017-10-31 DIAGNOSIS — E06.3 CHRONIC LYMPHOCYTIC THYROIDITIS: ICD-10-CM

## 2017-10-31 DIAGNOSIS — E10.9 TYPE 1 DIABETES MELLITUS WITHOUT COMPLICATION (H): Primary | ICD-10-CM

## 2017-10-31 DIAGNOSIS — E78.2 MIXED HYPERLIPIDEMIA: ICD-10-CM

## 2017-10-31 LAB — HBA1C MFR BLD: 7.3 % (ref 4.3–6)

## 2017-10-31 ASSESSMENT — PAIN SCALES - GENERAL: PAINLEVEL: NO PAIN (0)

## 2017-10-31 NOTE — MR AVS SNAPSHOT
After Visit Summary   10/31/2017    Franklin Muir    MRN: [id number]           Patient Information     Date Of Birth          1970        Visit Information        Provider Department      10/31/2017 10:00 AM [facility]        Today's Diagnoses     Type 1 diabetes mellitus without complication (H)    -  1    Chronic lymphocytic thyroiditis        Mixed hyperlipidemia           Follow-ups after your visit        Follow-up notes from your care team     Return in about 3 months (around 1/31/2018).      Your next 10 appointments already scheduled     Feb 05, 2018 10:00 AM CST   (Arrive by 9:45 AM)   RETURN DIABETES with Liz Conroy PA-C   [facility] ([facility])    [address] [place] [address] [phone]            Apr 24, 2018  8:30 AM CDT   (Arrive by 8:15 AM)   RETURN DIABETES with Stacy Wilson MD   [facility] ([facility])    [address] [place] [address] [phone]              Who to contact     Please call your clinic at [phone] to:    Ask questions about your health    Make or cancel appointments    Discuss your medicines    Learn about your test results    Speak to your doctor   If you have compliments or concerns about an experience at your clinic, or if you wish to file a complaint, please contact [facility] Patient Relations at [phone] or email us at [email or web address]         Additional Information About Your Visit        MyChart Information     [employer] gives you secure access to your electronic health record. If you see a primary care provider, you can also send messages to your care team and make appointments. If you have questions, please call your primary care clinic.  If you do not have a primary care provider, please call  "[phone] and they will assist you.      [employer] is an electronic gateway that provides easy, online access to your medical records. With [employer], you can request a clinic appointment, read your test results, renew a prescription or communicate with your care team.     To access your existing account, please contact your [facility] or call [phone] for assistance.        Care EveryWhere ID     This is your Care EveryWhere ID. This could be used by other organizations to access your [place] medical records  [id number]        Your Vitals Were     Pulse Height BMI (Body Mass Index)             68 1.778 m (5' 10\") 25.25 kg/m2          Blood Pressure from Last 3 Encounters:   10/31/17 120/76   07/21/17 126/74   07/10/17 123/79    Weight from Last 3 Encounters:   10/31/17 79.8 kg (176 lb)   07/21/17 78.5 kg (173 lb)   07/10/17 79.4 kg (175 lb)              We Performed the Following     Hemoglobin A1c POCT        Primary Care Provider Office Phone # Fax #    Ruddy Malave [phone]       [address] [facility] [address]        Equal Access to Services     GIULIANO MAHMOOD : Hadii wendy payneo Sojuanjo, waaxda luqadaha, qaybta kaalmada adeegyada, celestine sweet. So [facility] [phone].    ATENCIÓN: Si habla español, tiene a pedro disposición servicios gratuitos de asistencia lingüística. Llame al [phone].    We comply with applicable federal civil rights laws and [place] laws. We do not discriminate on the basis of race, color, national origin, age, disability, sex, sexual orientation, or gender identity.            Thank you!     Thank you for choosing [facility] ENDOCRIN[facility]  for your care. Our goal is always to provide you with excellent care. Hearing back from our patients is one way we can continue to improve our services. Please take a few minutes to complete the written survey that you may receive in the mail " after your visit with us. Thank you!             Your Updated Medication List - Protect others around you: Learn how to safely use, store and throw away your medicines at www.disposemymeds.org.          This list is accurate as of: 10/31/17 11:59 PM.  Always use your most recent med list.                   Brand Name Dispense Instructions for use Diagnosis    ACE/ARB/ARNI NOT PRESCRIBED (INTENTIONAL)      by Other route continuous prn.        aspirin 81 MG tablet     100    ONE DAILY        atorvastatin 10 MG tablet    LIPITOR    90 tablet    Take 1 tablet (10 mg) by mouth daily    Mixed hyperlipidemia       blood glucose monitoring meter device kit      Use daily        blood glucose test strip    ESTEVAN BREEZE 2    450 each    Test 5 times daily    Diabetes mellitus type 1 (H)       CLINPRO 5000 1.1 % Pste   Generic drug:  Sodium Fluoride      Apply  to affected area. Use once every night        glucagon 1 MG kit    GLUCAGON EMERGENCY    2 each    Use as directed    Type 1 diabetes mellitus without complication (H)       Injection Device for insulin Carmen    NOVOPEN JR (GREEN)    1 each    Use as directed with insulin    Diabetes mellitus, type 1       insulin glargine 100 UNIT/ML injection    LANTUS SOLOSTAR    15 mL    Inject Subcutaneous. Inject 11 units daily    Diabetes mellitus type 1 (H)       insulin pen needle 31G X 5 MM    B-D U/F    450 each    Use 5 daily or as directed.    Type 1 diabetes mellitus without complication (H)       loratadine 10 MG tablet    CLARITIN          NovoLOG PENFILL 100 UNIT/ML injection   Generic drug:  insulin aspart     30 mL    Use with Novopen carb counting with meals and using about 20 units per day    Diabetes mellitus type 1 (H)

## 2017-10-31 NOTE — LETTER
10/31/2017       RE: Franklin Muir  [address]  [place] [address]     Dear Colleague,    Thank you for referring your patient, Franklin Muir, to the [facility]OLOGY at [facility]. Please see a copy of my visit note below.    HPI:   Franklin is a 48 yo man here for follow up of type 1 diabetes.  He recently established care me after Dr. Swenson left, and he is now also seeing Liz Conroy in our clinic.      Type 1 diabetes was diagnosed in  ( and anti DEE > 250) .     He is currently on Lantus to 11 units daily at lunchtime and Novolog 1 unit/ 20 grams of CHO with meals and snacks, and for pre-meal correction @ 1/2 U per 30 mg/dL above 150 mg/dL.  He is now exercising on  and  by jogging or doing cardio plus weight lifting at the gym.  Exercise activities last about 1 hour.     Download from his meter shows patient is checking his blood glucose 4.1 times per day, what is an improvement from his previous pleased with me when he was checking his  blood glucose2.8 times per day.  Average is 173+/- 60 mg/dl.    Highest recorded blood glucose level is 310, and the lowest is 53.  He has not had any hypoglycemic episodes post-exercising.  He develops hypoglycemic symptoms when blood glucose levels are about 60 mg/dl.    We reviewed all his blood glucose data together and identified a few instances when he may not be properly covering his carbohydrate intake.  Franklin reports that, especially at night, he is concerned of taking too much insulin to cover his snacks or to correct for elevated blood sugars, as he is afraid of hypoglycemia.  It seems his highest BG levels are around lunch time.  His brother also had type 1 diabetes and  of DKA, although Franklin reported today he  from hypoglycemia (?).  A1c is 7.3% today    Diabetes complications  Retinopathy: No recent vision changes and up to date with yearly eye exams, no DR in last  opthalmology examination  Nephropathy: No nephropathy - negative microalbuminuria and normal GFR  Neuropathy: No neuropathy  Macrovascular: His LDL was his slightly above 100 and patient was started on statin  Brother  at age 26 from DKA      He also has a history of Hashimoto's ab positivity with normal function, has never been on levothyroxine replacement therapy. He generally has been feeling well and has no concerns today. Last TSH wants within the normal range. He does not recall being screened for celiac disease.  He denies bloating/diarrhea/constipation and/or gluten intolerance.      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        ROS   11 point ROS is as per HPI, as per patient below or negative    Answers for HPI/ROS submitted by the patient on 10/31/2017   General Symptoms: No  Skin Symptoms: No  HENT Symptoms: No  EYE SYMPTOMS: No  HEART SYMPTOMS: No  LUNG SYMPTOMS: No  INTESTINAL SYMPTOMS: No  URINARY SYMPTOMS: No  REPRODUCTIVE SYMPTOMS: No  SKELETAL SYMPTOMS: No  BLOOD SYMPTOMS: No  NERVOUS SYSTEM SYMPTOMS: No  MENTAL HEALTH SYMPTOMS: No    Past Medical History:   Diagnosis Date     Chronic lymphocytic thyroiditis 2011     Chronic lymphocytic thyroiditis 2011     Chronic lymphocytic thyroiditis      Depressive disorder 1997    Treated for a couple of years; not currently experiencing     Diabetes 2009       Past Surgical History:   Procedure Laterality Date     HC TOOTH EXTRACTION W/FORCEP         Family History   Problem Relation Age of Onset     Hyperlipidemia Mother      DIABETES Brother      Hypertension Paternal Grandfather      CEREBROVASCULAR DISEASE Paternal Grandfather      Hypertension Maternal Grandfather      Hyperlipidemia Maternal Grandfather      CEREBROVASCULAR DISEASE Maternal Grandfather      Breast Cancer Maternal Aunt      DIABETES Brother      Anxiety Disorder Brother      Coronary Artery  "Disease Other      Maternal great grandfather     Hypertension Maternal Grandmother      Hyperlipidemia Maternal Grandmother      Hyperlipidemia Brother      CEREBROVASCULAR DISEASE Paternal Grandmother      Asthma No family hx of      C.A.D. No family hx of      Cancer - colorectal No family hx of      Prostate Cancer No family hx of      Brother with T1D  at age 26 from DKA      Social History     Social History     Marital status:      Spouse name: N/A     Number of children: N/A     Years of education: N/A     Social History Main Topics     Smoking status: Never Smoker     Smokeless tobacco: Never Used     Alcohol use Yes      Comment: ~ 1 drink/day     Drug use: No     Sexual activity: Yes     Partners: Female     Birth control/ protection: Condom, Natural Family Planning      Comment: Would like to talk about vasectomy     Other Topics Concern     Parent/Sibling W/ Cabg, Mi Or Angioplasty Before 65f 55m? No     Social History Narrative   Social Hx:   .  Has a young son.  Works a  at the [employer].    Has an exchange student currently living at his house.    Current Outpatient Prescriptions   Medication     blood glucose (ESTEVAN BREEZE 2) test strip     insulin pen needle (B-D U/F) 31G X 5 MM     atorvastatin (LIPITOR) 10 MG tablet     insulin glargine (LANTUS SOLOSTAR) 100 UNIT/ML injection     NOVOLOG PENFILL 100 UNIT/ML soln     glucagon (GLUCAGON EMERGENCY) 1 MG injection     loratadine (CLARITIN) 10 MG tablet     Injection Device for insulin (NOVOPEN JRJAIDEN,) DUSTIN     Sodium Fluoride (CLINPRO 5000) 1.1 % PSTE     Blood Glucose Monitoring Suppl (RADEUMIA BREEZE 2 SYSTEM) W/DEVICE KIT     ACE/ARB NOT PRESCRIBED, INTENTIONAL,     ASPIRIN 81 MG OR TABS     No current facility-administered medications for this visit.           Allergies   Allergen Reactions     Ceftin Rash     Ampicillin Rash       Physical Exam  /76  Pulse 68  Ht 1.778 m (5' 10\")  " Wt 79.8 kg (176 lb)  BMI 25.25 kg/m2  Constitutional: Appears well-developed and well-nourished. Active.   EYES: anicteric, normal extra-ocular movements, no lid lag or retraction   HEENT: Mouth/Throat: Mucous membrane is moist. Oropharynx is clear. No adenopathy.  Thyroid:  Thyroid is barely palpable, only left lobe is palpable, no discrete nodules, normal consistency   Cardiovascular: RRR, S1, S2 normal. No m/g/r   Pulmonary/Chest: CTAB. No wheezing or rales   Abdominal: +BS. Non tender to palpation. No organomegaly present.  Neurological: Alert. CNII-XII intact. Muscle strength 5/5. Sensory is intact.  Extremities: No clubbing, cyanosis or inflammation. No tremor of the outstretched hands.   Skin: normal texture, color and temperature  Feet: No lesions or ulcers. Pedal pulse is palpable.  Vibratory sensation (~ 16 sec) and pinprick are normal.  Lymphatic: no cervical lymphadenopathy.  Psychological: appropriate mood and affect         RESULTS  ENDO CALCIUM LABS-[facility] Latest Ref Rng & Units 3/13/2017   CALCIUM 8.5 - 10.1 mg/dL 9.1   BUN 7 - 30 mg/dL 15   CREATININE 0.66 - 1.25 mg/dL 0.89   HEMOGLOBIN A1C 4.3 - 6.0 % 8.4 (H)   HEMOGLOBIN A1C, POC 4.3 - 6.0 %    GLUCOSE 70 - 99 mg/dL 172 (H)   CHOLESTEROL <200 mg/dL 106   LDL CHOLESTEROL, CALCULATED <100 mg/dL 46   HDL CHOLESTEROL >39 mg/dL 51   VLDL-CHOLESTEROL 0 - 30 mg/dL    NON HDL CHOLESTEROL <130 mg/dL 55   TRIGLYCERIDES <150 mg/dL 46   ALBUMIN URINE MG/L mg/L 8   ALBUMIN URINE MG/G CR 0 - 17 mg/g Cr 5.23   CREATININE 0.66 - 1.25 mg/dL 0.89   POTASSIUM 3.4 - 5.3 mmol/L 4.3   ALT 0 - 70 U/L 24   TSH 0.40 - 4.00 mU/L 2.81     ENDO THYROID LABS-[facility] Latest Ref Rng & Units 11/19/2009   THYROGLOBULIN ANTIBODY <40 IU/mL <20   THYR PEROXIDASE ARIELLA <35 IU/mL 70 (H)       Assessment/Plan:     Franklin Muir is a very pleasant 47 year-old male patient with a 8 years history of type 1 diabetes, with reasonable glycemic control and no chronic complications    1.  Type 1  diabetes: Overall, very well controlled, but with some room for improvement.  He is having lenard hypoglycemic episodes with the insulin adjustments we made. We have again discussed the use of a continuous glucose monitor, as this can help him overcoming some of his hypoglycemia concerns. We also briefly discussed the the use of a close-loop insulin pump, as something he may want to consider in the future. Franklin will follow up with Liz Zhao in regards to the CGMS.       2.  Chronic complications of diabetes:  There is no evidence of chronic diabetic complications.   Retinopathy:  None, last eye exam was normal.   Nephropathy:  BP well controlled. No microalbuminuria.  Normal GFR.   Neuropathy: None.    Feet: OK, no ulcers.   Lipids:  LDL at target.  Patient taking statin    3. Health maintenance: Will check vitamin D with next blood draw.        Orders Placed This Encounter   Procedures     Hemoglobin A1c POCT       F/U in 3 months with Liz Conroy and in 6 months with me, sooner with concerns.       Freya Wilson MD PhD     Division of Endocrinology and Diabetes

## 2017-10-31 NOTE — PROGRESS NOTES
HPI:   Franklin is a 48 yo man here for follow up of type 1 diabetes.  He recently established care me after Dr. Swenson left, and he is now also seeing Liz Conroy in our clinic.      Type 1 diabetes was diagnosed in  ( and anti DEE > 250).     He is currently on Lantus to 11 units daily at lunchtime and Novolog 1 unit/ 20 grams of CHO with meals and snacks, and for pre-meal correction @ 1/2 U per 30 mg/dL above 150 mg/dL.  He is now exercising on  and  by jogging or doing cardio plus weight lifting at the gym.  Exercise activities last about 1 hour.     Download from his meter shows patient is checking his blood glucose 4.1 times per day, what is an improvement from his previous pleased with me when he was checking his  blood glucose2.8 times per day.  Average is 173+/- 60 mg/dl.    Highest recorded blood glucose level is 310, and the lowest is 53.  He has not had any hypoglycemic episodes post-exercising.  He develops hypoglycemic symptoms when blood glucose levels are about 60 mg/dl.    We reviewed all his blood glucose data together and identified a few instances when he may not be properly covering his carbohydrate intake.  Franklin reports that, especially at night, he is concerned of taking too much insulin to cover his snacks or to correct for elevated blood sugars, as he is afraid of hypoglycemia.  It seems his highest BG levels are around lunch time.  His brother also had type 1 diabetes and  of DKA, although Franklin reported today he  from hypoglycemia (?).  A1c is 7.3% today    Diabetes complications  Retinopathy: No recent vision changes and up to date with yearly eye exams, no DR in last opthalmology examination  Nephropathy: No nephropathy - negative microalbuminuria and normal GFR  Neuropathy: No neuropathy  Macrovascular: His LDL was his slightly above 100 and patient was started on statin  Brother  at age 26 from DKA      He also has a history of Hashimoto's  ab positivity with normal function, has never been on levothyroxine replacement therapy. He generally has been feeling well and has no concerns today. Last TSH wants within the normal range. He does not recall being screened for celiac disease.  He denies bloating/diarrhea/constipation and/or gluten intolerance.      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        ROS   11 point ROS is as per HPI, as per patient below or negative    Answers for HPI/ROS submitted by the patient on 10/31/2017   General Symptoms: No  Skin Symptoms: No  HENT Symptoms: No  EYE SYMPTOMS: No  HEART SYMPTOMS: No  LUNG SYMPTOMS: No  INTESTINAL SYMPTOMS: No  URINARY SYMPTOMS: No  REPRODUCTIVE SYMPTOMS: No  SKELETAL SYMPTOMS: No  BLOOD SYMPTOMS: No  NERVOUS SYSTEM SYMPTOMS: No  MENTAL HEALTH SYMPTOMS: No    Past Medical History:   Diagnosis Date     Chronic lymphocytic thyroiditis 7/20/2011     Chronic lymphocytic thyroiditis 7/20/2011     Chronic lymphocytic thyroiditis      Depressive disorder 06/01/1997    Treated for a couple of years; not currently experiencing     Diabetes 6/23/2009       Past Surgical History:   Procedure Laterality Date     HC TOOTH EXTRACTION W/FORCEP         Family History   Problem Relation Age of Onset     Hyperlipidemia Mother      DIABETES Brother      Hypertension Paternal Grandfather      CEREBROVASCULAR DISEASE Paternal Grandfather      Hypertension Maternal Grandfather      Hyperlipidemia Maternal Grandfather      CEREBROVASCULAR DISEASE Maternal Grandfather      Breast Cancer Maternal Aunt      DIABETES Brother      Anxiety Disorder Brother      Coronary Artery Disease Other      Maternal great grandfather     Hypertension Maternal Grandmother      Hyperlipidemia Maternal Grandmother      Hyperlipidemia Brother      CEREBROVASCULAR DISEASE Paternal Grandmother      Asthma No family hx of      C.A.D. No family hx of      Cancer - colorectal No  "family hx of      Prostate Cancer No family hx of      Brother with T1D  at age 26 from DKA      Social History     Social History     Marital status:      Spouse name: N/A     Number of children: N/A     Years of education: N/A     Social History Main Topics     Smoking status: Never Smoker     Smokeless tobacco: Never Used     Alcohol use Yes      Comment: ~ 1 drink/day     Drug use: No     Sexual activity: Yes     Partners: Female     Birth control/ protection: Condom, Natural Family Planning      Comment: Would like to talk about vasectomy     Other Topics Concern     Parent/Sibling W/ Cabg, Mi Or Angioplasty Before 65f 55m? No     Social History Narrative   Social Hx:   .  Has a young son.  Works a  at the [employer].    Has an exchange student currently living at his house.    Current Outpatient Prescriptions   Medication     blood glucose (ESTEVAN BREEZE 2) test strip     insulin pen needle (B-D U/F) 31G X 5 MM     atorvastatin (LIPITOR) 10 MG tablet     insulin glargine (LANTUS SOLOSTAR) 100 UNIT/ML injection     NOVOLOG PENFILL 100 UNIT/ML soln     glucagon (GLUCAGON EMERGENCY) 1 MG injection     loratadine (CLARITIN) 10 MG tablet     Injection Device for insulin (NOVOPEN JR, GREEN,) DUSTIN     Sodium Fluoride (CLINPRO 5000) 1.1 % PSTE     Blood Glucose Monitoring Suppl (ASCENSIA BREEZE 2 SYSTEM) W/DEVICE KIT     ACE/ARB NOT PRESCRIBED, INTENTIONAL,     ASPIRIN 81 MG OR TABS     No current facility-administered medications for this visit.           Allergies   Allergen Reactions     Ceftin Rash     Ampicillin Rash       Physical Exam  /76  Pulse 68  Ht 1.778 m (5' 10\")  Wt 79.8 kg (176 lb)  BMI 25.25 kg/m2  Constitutional: Appears well-developed and well-nourished. Active.   EYES: anicteric, normal extra-ocular movements, no lid lag or retraction   HEENT: Mouth/Throat: Mucous membrane is moist. Oropharynx is clear. No adenopathy.  Thyroid:  Thyroid is " barely palpable, only left lobe is palpable, no discrete nodules, normal consistency   Cardiovascular: RRR, S1, S2 normal. No m/g/r   Pulmonary/Chest: CTAB. No wheezing or rales   Abdominal: +BS. Non tender to palpation. No organomegaly present.  Neurological: Alert. CNII-XII intact. Muscle strength 5/5. Sensory is intact.  Extremities: No clubbing, cyanosis or inflammation. No tremor of the outstretched hands.   Skin: normal texture, color and temperature  Feet: No lesions or ulcers. Pedal pulse is palpable.  Vibratory sensation (~ 16 sec) and pinprick are normal.  Lymphatic: no cervical lymphadenopathy.  Psychological: appropriate mood and affect         RESULTS  ENDO CALCIUM LABS-[facility] Latest Ref Rng & Units 3/13/2017   CALCIUM 8.5 - 10.1 mg/dL 9.1   BUN 7 - 30 mg/dL 15   CREATININE 0.66 - 1.25 mg/dL 0.89   HEMOGLOBIN A1C 4.3 - 6.0 % 8.4 (H)   HEMOGLOBIN A1C, POC 4.3 - 6.0 %    GLUCOSE 70 - 99 mg/dL 172 (H)   CHOLESTEROL <200 mg/dL 106   LDL CHOLESTEROL, CALCULATED <100 mg/dL 46   HDL CHOLESTEROL >39 mg/dL 51   VLDL-CHOLESTEROL 0 - 30 mg/dL    NON HDL CHOLESTEROL <130 mg/dL 55   TRIGLYCERIDES <150 mg/dL 46   ALBUMIN URINE MG/L mg/L 8   ALBUMIN URINE MG/G CR 0 - 17 mg/g Cr 5.23   CREATININE 0.66 - 1.25 mg/dL 0.89   POTASSIUM 3.4 - 5.3 mmol/L 4.3   ALT 0 - 70 U/L 24   TSH 0.40 - 4.00 mU/L 2.81     ENDO THYROID LABS-[facility] Latest Ref Rng & Units 11/19/2009   THYROGLOBULIN ANTIBODY <40 IU/mL <20   THYR PEROXIDASE ARIELLA <35 IU/mL 70 (H)       Assessment/Plan:     Franklin Muir is a very pleasant 47 year-old male patient with a 8 years history of type 1 diabetes, with reasonable glycemic control and no chronic complications    1.  Type 1 diabetes: Overall, very well controlled, but with some room for improvement.  He is having lenard hypoglycemic episodes with the insulin adjustments we made. We have again discussed the use of a continuous glucose monitor, as this can help him overcoming some of his hypoglycemia concerns. We  also briefly discussed the the use of a close-loop insulin pump, as something he may want to consider in the future. Franklin will follow up with Liz Zhao in regards to the CGMS.       2.  Chronic complications of diabetes:  There is no evidence of chronic diabetic complications.   Retinopathy:  None, last eye exam was normal.   Nephropathy:  BP well controlled. No microalbuminuria.  Normal GFR.   Neuropathy: None.    Feet: OK, no ulcers.   Lipids:  LDL at target.  Patient taking statin    3. Health maintenance: Will check vitamin D with next blood draw.        Orders Placed This Encounter   Procedures     Hemoglobin A1c POCT       F/U in 3 months with Liz Conroy and in 6 months with me, sooner with concerns.       Freya Wilson MD PhD     Division of Endocrinology and Diabetes

## 2018-01-23 ENCOUNTER — TELEPHONE (OUTPATIENT)
Dept: ENDOCRINOLOGY | Facility: CLINIC | Age: 48
End: 2018-01-23

## 2018-01-23 DIAGNOSIS — E10.9 DIABETES MELLITUS TYPE 1 (H): ICD-10-CM

## 2018-01-23 RX ORDER — INSULIN ASPART 100 [IU]/ML
INJECTION, SOLUTION INTRAVENOUS; SUBCUTANEOUS
Qty: 30 ML | Refills: 3 | Status: SHIPPED | OUTPATIENT
Start: 2018-01-23 | End: 2019-03-12

## 2018-02-05 ENCOUNTER — OFFICE VISIT (OUTPATIENT)
Dept: ENDOCRINOLOGY | Facility: CLINIC | Age: 48
End: 2018-02-05
Payer: COMMERCIAL

## 2018-02-05 VITALS
SYSTOLIC BLOOD PRESSURE: 113 MMHG | HEIGHT: 70 IN | OXYGEN SATURATION: 9 % | WEIGHT: 176.3 LBS | DIASTOLIC BLOOD PRESSURE: 73 MMHG | BODY MASS INDEX: 25.24 KG/M2 | HEART RATE: 69 BPM

## 2018-02-05 DIAGNOSIS — E10.65 TYPE 1 DIABETES MELLITUS WITH HYPERGLYCEMIA (H): Primary | ICD-10-CM

## 2018-02-05 LAB — HBA1C MFR BLD: 7.3 % (ref 4.3–6)

## 2018-02-05 ASSESSMENT — PAIN SCALES - GENERAL: PAINLEVEL: NO PAIN (0)

## 2018-02-05 NOTE — NURSING NOTE
"Chief Complaint   Patient presents with     RECHECK     Diabetes Type1       Initial /73 (BP Location: Right arm, Patient Position: Sitting, Cuff Size: Adult Large)  Pulse 69  Ht 1.778 m (5' 10\")  Wt 80 kg (176 lb 4.8 oz)  SpO2 (!) 9%  BMI 25.3 kg/m2 Estimated body mass index is 25.3 kg/(m^2) as calculated from the following:    Height as of this encounter: 1.778 m (5' 10\").    Weight as of this encounter: 80 kg (176 lb 4.8 oz).  Medication Reconciliation: complete    "

## 2018-02-05 NOTE — LETTER
2018       RE: Franklin Muir  [address]  [place] [address]     Dear Colleague,    Thank you for referring your patient, Franklin Muir, to the [facility] ENDOCRINOLOGY at [facility]. Please see a copy of my visit note below.    HPI:   Franklin is a 46 yo man here for follow up of type 1 diabetes.  He also sees Dr. Wilson. He reports that things have been going well, and he is no longer having much hypoglycemia.  He is currently on Lantus to 10 units daily at 7 AM and Novolog 1 unit/20 grams of CHO with meals and snacks, and for pre-meal correction @ 1/2 U per 30 mg/dL above 150 during the day and over 200 at HS.     Download from his meter shows patient is checking his blood glucose 2.8 times per day, average is 149 mg/dL.  He does have strong symptoms alerting him to hypoglycemia.  His brother also had type 1 diabetes and  of DKA.    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  He does cardio and weight lifting, basketball, mostly in the mid-day.      He also has a history of Hashimoto's ab positivity with normal function, has never been on levothyroxine replacement therapy. He generally has been feeling well and has no concerns today.     ROS   GENERAL: no weight loss, weight gain, fevers, chills, malaise, night sweats.   HEENT: no dysphagia, diplopia, neck pain or tenderness, dry/scratchy eyes, URI, cough, sinus drainage, tinnitus, sinus pressure  CV: no chest pain, pressure, palpitations, skipped beats, LOC  LUNGS: no SOB, FOUNTAIN, cough, sputum production, wheezing   GI: no diarrhea, constipation, abdominal pain  EXTREMITIES: no rashes, ulcers, edema  NEUROLOGY: no changes in vision, tingling or numbness in hands or feet.   MSK: no muscle aches or pains, weakness  PSYCH: mood stable    Past Medical History:   Diagnosis Date     Chronic lymphocytic thyroiditis 2011      Chronic lymphocytic thyroiditis 7/20/2011     Chronic lymphocytic thyroiditis      Depressive disorder 06/01/1997    Treated for a couple of years; not currently experiencing     Diabetes 6/23/2009       Past Surgical History:   Procedure Laterality Date     HC TOOTH EXTRACTION W/FORCEP         Family History   Problem Relation Age of Onset     Hyperlipidemia Mother      DIABETES Brother      Hypertension Paternal Grandfather      CEREBROVASCULAR DISEASE Paternal Grandfather      Hypertension Maternal Grandfather      Hyperlipidemia Maternal Grandfather      CEREBROVASCULAR DISEASE Maternal Grandfather      Breast Cancer Maternal Aunt      DIABETES Brother      Anxiety Disorder Brother      Coronary Artery Disease Other      Maternal great grandfather     Hypertension Maternal Grandmother      Hyperlipidemia Maternal Grandmother      Hyperlipidemia Brother      CEREBROVASCULAR DISEASE Paternal Grandmother      Asthma No family hx of      C.A.D. No family hx of      Cancer - colorectal No family hx of      Prostate Cancer No family hx of        Social History     Social History     Marital status:      Spouse name: N/A     Number of children: N/A     Years of education: N/A     Social History Main Topics     Smoking status: Never Smoker     Smokeless tobacco: Never Used     Alcohol use Yes      Comment: ~ 1 drink/day     Drug use: No     Sexual activity: Yes     Partners: Female     Birth control/ protection: Condom, Natural Family Planning      Comment: Would like to talk about vasectomy     Other Topics Concern     Parent/Sibling W/ Cabg, Mi Or Angioplasty Before 65f 55m? No     Social History Narrative   Social Hx:   .  Has a young son.  Works a  at the [employer].  Currently on sabbatical. Physically active.    Current Outpatient Prescriptions   Medication     VITAMIN D, CHOLECALCIFEROL, PO     NOVOLOG PENFILL 100 UNIT/ML soln     blood glucose (ESTEVAN BREEZE 2) test  "strip     insulin pen needle (B-D U/F) 31G X 5 MM     atorvastatin (LIPITOR) 10 MG tablet     insulin glargine (LANTUS SOLOSTAR) 100 UNIT/ML injection     glucagon (GLUCAGON EMERGENCY) 1 MG injection     loratadine (CLARITIN) 10 MG tablet     Injection Device for insulin (NOVOPEN JR, JAIDEN,) DUSTIN     Sodium Fluoride (CLINPRO 5000) 1.1 % PSTE     Blood Glucose Monitoring Suppl ([employer] 2 SYSTEM) W/DEVICE KIT     ACE/ARB NOT PRESCRIBED, INTENTIONAL,     ASPIRIN 81 MG OR TABS     No current facility-administered medications for this visit.           Allergies   Allergen Reactions     Ceftin Rash     Ampicillin Rash       Physical Exam  /73 (BP Location: Right arm, Patient Position: Sitting, Cuff Size: Adult Large)  Pulse 69  Ht 1.778 m (5' 10\")  Wt 80 kg (176 lb 4.8 oz)  SpO2 (!) 9%  BMI 25.3 kg/m2  GENERAL:  Alert and oriented X3, NAD, well dressed, answering questions appropriately, appears stated age.  EXTREMITIES: no edema, +pulses, no rashes, no lesions    RESULTS  Lab Results   Component Value Date    A1C 8.4 (H) 03/13/2017    A1C 7.6 (H) 02/18/2016    A1C 7.8 (H) 07/07/2015    A1C 6.9 (H) 12/03/2013    A1C 6.9 (A) 09/25/2013    HEMOGLOBINA1 7.3 (A) 10/31/2017    HEMOGLOBINA1 6.9 (A) 07/10/2017    HEMOGLOBINA1 7.7 (A) 09/21/2016    HEMOGLOBINA1 7.5 (A) 06/01/2016    HEMOGLOBINA1 7.3 (A) 10/21/2015       TSH   Date Value Ref Range Status   03/13/2017 2.81 0.40 - 4.00 mU/L Final   02/18/2016 3.26 0.40 - 4.00 mU/L Final   07/07/2015 1.70 0.40 - 4.00 mU/L Final   01/05/2015 1.71 0.40 - 4.00 mU/L Final     Comment:     Effective 7/30/2014, the reference range for this assay has changed to reflect   new instrumentation/methodology.     12/03/2013 1.94 0.4 - 5.0 mU/L Final     T4 Total   Date Value Ref Range Status   09/28/2010 9.0 5.0 - 11.0 ug/dL Final     T4 Free   Date Value Ref Range Status   02/18/2016 1.06 0.76 - 1.46 ng/dL Final   07/07/2015 1.04 0.76 - 1.46 ng/dL Final   01/05/2015 1.02 0.76 " "- 1.46 ng/dL Final     Comment:     Effective 7/30/2014, the reference range for this assay has changed to reflect   new instrumentation/methodology.     12/03/2013 1.08 0.70 - 1.85 ng/dL Final   01/26/2011 1.10 0.70 - 1.85 ng/dL Final       ALT   Date Value Ref Range Status   03/13/2017 24 0 - 70 U/L Final   02/18/2016 24 0 - 70 U/L Final   ]    Recent Labs   Lab Test  03/13/17   0851  02/18/16   0837  01/05/15   0933  12/03/13   0907   CHOL  106  120  134  163   HDL  51  56  47  43   LDL  46  56  76  108   TRIG  46  40  57  57   CHOLHDLRATIO   --    --   2.9  3.8       Lab Results   Component Value Date     03/13/2017      Lab Results   Component Value Date    POTASSIUM 4.3 03/13/2017     Lab Results   Component Value Date    CHLORIDE 102 03/13/2017     Lab Results   Component Value Date    SHANIKA 9.1 03/13/2017     Lab Results   Component Value Date    CO2 31 03/13/2017     Lab Results   Component Value Date    BUN 15 03/13/2017     Lab Results   Component Value Date    CR 0.89 03/13/2017       GFR Estimate   Date Value Ref Range Status   03/13/2017 >90  Non  GFR Calc   >60 mL/min/1.7m2 Final   02/18/2016 >90  Non  GFR Calc   >60 mL/min/1.7m2 Final   01/05/2015 >90  Non  GFR Calc   >60 mL/min/1.7m2 Final     GFR Estimate If Black   Date Value Ref Range Status   03/13/2017 >90   GFR Calc   >60 mL/min/1.7m2 Final   02/18/2016 >90   GFR Calc   >60 mL/min/1.7m2 Final   01/05/2015 >90   GFR Calc   >60 mL/min/1.7m2 Final       Lab Results   Component Value Date    MICROL 8 03/13/2017     No results found for: MICROALBUMIN  Lab Results   Component Value Date    CPEPT 2.3 11/19/2009    GADAB >250.0 (H) 11/19/2009    ISCAB  09/08/2009     1:16  Reference range: <1:4  (Note)  TEST INFORMATION: Islet Cell Ab, IgG  Islet cell antibodies have been associated with  \"autoimmune\" endocrine disorders and insulin-dependent  diabetes. " This disorder is characterized by the presence  of antibodies in patients that may be detected years  before the onset of the clinical symptoms. To calculate  Juvenile Diabetes Foundation (JDF) units: multiply the  titer x 5 (1:8  8 x 5 = 40 JDF Units).  Performed by [employer],  [address] [facility] [place] [address] [phone]  [email or web address], Evelia Miles MD, Lab. Director       No results found for: B12]    Most recent eye exam date: : 11/18/2015   Eye exam in December, 2017- no retinopathy      Assessment/Plan:     1.  Type 1 diabetes- Overall, very well controlled and he is no longer having too much hypoglycemia. A1c is up a bit to 7.3%. He is not currently interested in sensors or pumps, but may be in the future.     2.  Risk factors-     Retinopathy:  No.  Had eye exam within last year.   Nephropathy:  BP well controlled. No microalbuminuria.  Creatinine stable.   Neuropathy: No.    Feet: OK, no ulcers.   Lipids:  LDL at target.  Patient taking statin  Screening: Will screen for celiac and TSH.     3.  F/U in 3 months as planned with Dr. Wilson.  I will see him in 6 months, sooner with concerns.    I spent 30 minutes with this patient face to face and explained the conditions and plans (more than 50% of time was counseling/coordination of care, diabetes management, follow up plan for worsening hyper and hypoglycemia) . The patient understood and is satisfied with today's visit.      Liz Conroy PA-C, MPAS   [facility]  Department of Medicine  Division of Endocrinology and Diabetes

## 2018-02-05 NOTE — PROGRESS NOTES
HPI:   Franklin is a 46 yo man here for follow up of type 1 diabetes.  He also sees Dr. Wilson. He reports that things have been going well, and he is no longer having much hypoglycemia.  He is currently on Lantus to 10 units daily at 7 AM and Novolog 1 unit/20 grams of CHO with meals and snacks, and for pre-meal correction @ 1/2 U per 30 mg/dL above 150 during the day and over 200 at HS.     Download from his meter shows patient is checking his blood glucose 2.8 times per day, average is 149 mg/dL.  He does have strong symptoms alerting him to hypoglycemia.  His brother also had type 1 diabetes and  of DKA.    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  He does cardio and weight lifting, basketball, mostly in the mid-day.      He also has a history of Hashimoto's ab positivity with normal function, has never been on levothyroxine replacement therapy. He generally has been feeling well and has no concerns today.     ROS   GENERAL: no weight loss, weight gain, fevers, chills, malaise, night sweats.   HEENT: no dysphagia, diplopia, neck pain or tenderness, dry/scratchy eyes, URI, cough, sinus drainage, tinnitus, sinus pressure  CV: no chest pain, pressure, palpitations, skipped beats, LOC  LUNGS: no SOB, FOUNTAIN, cough, sputum production, wheezing   GI: no diarrhea, constipation, abdominal pain  EXTREMITIES: no rashes, ulcers, edema  NEUROLOGY: no changes in vision, tingling or numbness in hands or feet.   MSK: no muscle aches or pains, weakness  PSYCH: mood stable    Past Medical History:   Diagnosis Date     Chronic lymphocytic thyroiditis 2011     Chronic lymphocytic thyroiditis 2011     Chronic lymphocytic thyroiditis      Depressive disorder 1997    Treated for a couple of years; not currently experiencing     Diabetes 2009       Past Surgical History:   Procedure Laterality Date     HC TOOTH EXTRACTION  W/FORCEP         Family History   Problem Relation Age of Onset     Hyperlipidemia Mother      DIABETES Brother      Hypertension Paternal Grandfather      CEREBROVASCULAR DISEASE Paternal Grandfather      Hypertension Maternal Grandfather      Hyperlipidemia Maternal Grandfather      CEREBROVASCULAR DISEASE Maternal Grandfather      Breast Cancer Maternal Aunt      DIABETES Brother      Anxiety Disorder Brother      Coronary Artery Disease Other      Maternal great grandfather     Hypertension Maternal Grandmother      Hyperlipidemia Maternal Grandmother      Hyperlipidemia Brother      CEREBROVASCULAR DISEASE Paternal Grandmother      Asthma No family hx of      C.A.D. No family hx of      Cancer - colorectal No family hx of      Prostate Cancer No family hx of        Social History     Social History     Marital status:      Spouse name: N/A     Number of children: N/A     Years of education: N/A     Social History Main Topics     Smoking status: Never Smoker     Smokeless tobacco: Never Used     Alcohol use Yes      Comment: ~ 1 drink/day     Drug use: No     Sexual activity: Yes     Partners: Female     Birth control/ protection: Condom, Natural Family Planning      Comment: Would like to talk about vasectomy     Other Topics Concern     Parent/Sibling W/ Cabg, Mi Or Angioplasty Before 65f 55m? No     Social History Narrative   Social Hx:   .  Has a young son.  Works a  at the [employer].  Currently on [employer]. Physically active.    Current Outpatient Prescriptions   Medication     VITAMIN D, CHOLECALCIFEROL, PO     NOVOLOG PENFILL 100 UNIT/ML soln     blood glucose (ESTEVAN BREEZE 2) test strip     insulin pen needle (B-D U/F) 31G X 5 MM     atorvastatin (LIPITOR) 10 MG tablet     insulin glargine (LANTUS SOLOSTAR) 100 UNIT/ML injection     glucagon (GLUCAGON EMERGENCY) 1 MG injection     loratadine (CLARITIN) 10 MG tablet     Injection Device for insulin (NOVOPEN  "AJIDEN ZAZUETA,) DUSTIN     Sodium Fluoride (CLINPRO 5000) 1.1 % PSTE     Blood Glucose Monitoring Suppl ([employer] 2 SYSTEM) W/DEVICE KIT     ACE/ARB NOT PRESCRIBED, INTENTIONAL,     ASPIRIN 81 MG OR TABS     No current facility-administered medications for this visit.           Allergies   Allergen Reactions     Ceftin Rash     Ampicillin Rash       Physical Exam  /73 (BP Location: Right arm, Patient Position: Sitting, Cuff Size: Adult Large)  Pulse 69  Ht 1.778 m (5' 10\")  Wt 80 kg (176 lb 4.8 oz)  SpO2 (!) 9%  BMI 25.3 kg/m2  GENERAL:  Alert and oriented X3, NAD, well dressed, answering questions appropriately, appears stated age.  EXTREMITIES: no edema, +pulses, no rashes, no lesions    RESULTS  Lab Results   Component Value Date    A1C 8.4 (H) 03/13/2017    A1C 7.6 (H) 02/18/2016    A1C 7.8 (H) 07/07/2015    A1C 6.9 (H) 12/03/2013    A1C 6.9 (A) 09/25/2013    HEMOGLOBINA1 7.3 (A) 10/31/2017    HEMOGLOBINA1 6.9 (A) 07/10/2017    HEMOGLOBINA1 7.7 (A) 09/21/2016    HEMOGLOBINA1 7.5 (A) 06/01/2016    HEMOGLOBINA1 7.3 (A) 10/21/2015       TSH   Date Value Ref Range Status   03/13/2017 2.81 0.40 - 4.00 mU/L Final   02/18/2016 3.26 0.40 - 4.00 mU/L Final   07/07/2015 1.70 0.40 - 4.00 mU/L Final   01/05/2015 1.71 0.40 - 4.00 mU/L Final     Comment:     Effective 7/30/2014, the reference range for this assay has changed to reflect   new instrumentation/methodology.     12/03/2013 1.94 0.4 - 5.0 mU/L Final     T4 Total   Date Value Ref Range Status   09/28/2010 9.0 5.0 - 11.0 ug/dL Final     T4 Free   Date Value Ref Range Status   02/18/2016 1.06 0.76 - 1.46 ng/dL Final   07/07/2015 1.04 0.76 - 1.46 ng/dL Final   01/05/2015 1.02 0.76 - 1.46 ng/dL Final     Comment:     Effective 7/30/2014, the reference range for this assay has changed to reflect   new instrumentation/methodology.     12/03/2013 1.08 0.70 - 1.85 ng/dL Final   01/26/2011 1.10 0.70 - 1.85 ng/dL Final       ALT   Date Value Ref Range Status " "  03/13/2017 24 0 - 70 U/L Final   02/18/2016 24 0 - 70 U/L Final   ]    Recent Labs   Lab Test  03/13/17   0851  02/18/16   0837  01/05/15   0933  12/03/13   0907   CHOL  106  120  134  163   HDL  51  56  47  43   LDL  46  56  76  108   TRIG  46  40  57  57   CHOLHDLRATIO   --    --   2.9  3.8       Lab Results   Component Value Date     03/13/2017      Lab Results   Component Value Date    POTASSIUM 4.3 03/13/2017     Lab Results   Component Value Date    CHLORIDE 102 03/13/2017     Lab Results   Component Value Date    SHANIKA 9.1 03/13/2017     Lab Results   Component Value Date    CO2 31 03/13/2017     Lab Results   Component Value Date    BUN 15 03/13/2017     Lab Results   Component Value Date    CR 0.89 03/13/2017       GFR Estimate   Date Value Ref Range Status   03/13/2017 >90  Non  GFR Calc   >60 mL/min/1.7m2 Final   02/18/2016 >90  Non  GFR Calc   >60 mL/min/1.7m2 Final   01/05/2015 >90  Non  GFR Calc   >60 mL/min/1.7m2 Final     GFR Estimate If Black   Date Value Ref Range Status   03/13/2017 >90   GFR Calc   >60 mL/min/1.7m2 Final   02/18/2016 >90   GFR Calc   >60 mL/min/1.7m2 Final   01/05/2015 >90   GFR Calc   >60 mL/min/1.7m2 Final       Lab Results   Component Value Date    MICROL 8 03/13/2017     No results found for: MICROALBUMIN  Lab Results   Component Value Date    CPEPT 2.3 11/19/2009    GADAB >250.0 (H) 11/19/2009    ISCAB  09/08/2009     1:16  Reference range: <1:4  (Note)  TEST INFORMATION: Islet Cell Ab, IgG  Islet cell antibodies have been associated with  \"autoimmune\" endocrine disorders and insulin-dependent  diabetes. This disorder is characterized by the presence  of antibodies in patients that may be detected years  before the onset of the clinical symptoms. To calculate  Juvenile Diabetes Foundation (JDF) units: multiply the  titer x 5 (1:8  8 x 5 = 40 JDF Units).  Performed by [facility],  [address] [place] [address] [phone]  [email or web address], Evelia Miles MD, Lab. Director       No results found for: B12]    Most recent eye exam date: : 11/18/2015   Eye exam in December, 2017- no retinopathy      Assessment/Plan:     1.  Type 1 diabetes- Overall, very well controlled and he is no longer having too much hypoglycemia. A1c is up a bit to 7.3%. He is not currently interested in sensors or pumps, but may be in the future.     2.  Risk factors-     Retinopathy:  No.  Had eye exam within last year.   Nephropathy:  BP well controlled. No microalbuminuria.  Creatinine stable.   Neuropathy: No.    Feet: OK, no ulcers.   Lipids:  LDL at target.  Patient taking statin  Screening: Will screen for celiac and TSH.     3.  F/U in 3 months as planned with Dr. Wilson.  I will see him in 6 months, sooner with concerns.    I spent 30 minutes with this patient face to face and explained the conditions and plans (more than 50% of time was counseling/coordination of care, diabetes management, follow up plan for worsening hyper and hypoglycemia) . The patient understood and is satisfied with today's visit.      Liz Conroy PA-C, MPAS   [facility]  Department of Medicine  Division of Endocrinology and Diabetes

## 2018-02-05 NOTE — MR AVS SNAPSHOT
After Visit Summary   2/5/2018    Franklin Muir    MRN: [id number]           Patient Information     Date Of Birth          1970        Visit Information        Provider Department      2/5/2018 10:00 AM Liz Conroy PA-C [facility]         Follow-ups after your visit        Your next 10 appointments already scheduled     Apr 24, 2018  8:30 AM CDT   (Arrive by 8:15 AM)   RETURN DIABETES with MD [facility] ([facility] [[email or web address]] [facility] [place])    [address] [place] [id number] [phone]              Who to contact     Please call your clinic at [phone] to:    Ask questions about your health    Make or cancel appointments    Discuss your medicines    Learn about your test results    Speak to your doctor   If you have compliments or concerns about an experience at your clinic, or if you wish to file a complaint, please contact [facility] Patient Relations at [phone] or email us at [email or web address]         Additional Information About Your Visit        MyChart Information     [email or web address] gives you secure access to your electronic health record. If you see a primary care provider, you can also send messages to your care team and make appointments. If you have questions, please call your primary care clinic.  If you do not have a primary care provider, please call [phone] and they will assist you.      Med.ly is an electronic gateway that provides easy, online access to your medical records. With Med.ly, you can request a clinic appointment, read your test results, renew a prescription or communicate with your care team.     To access your existing account, please contact your [facility] or call [phone] for assistance.        Care EveryWhere ID     This is your Care EveryWhere ID. This could be used by other organizations  "to access your [place] medical records  [id number]        Your Vitals Were     Pulse Height Pulse Oximetry BMI (Body Mass Index)          69 1.778 m (5' 10\") 9% 25.3 kg/m2         Blood Pressure from Last 3 Encounters:   02/05/18 113/73   10/31/17 120/76   07/21/17 126/74    Weight from Last 3 Encounters:   02/05/18 80 kg (176 lb 4.8 oz)   10/31/17 79.8 kg (176 lb)   07/21/17 78.5 kg (173 lb)              Today, you had the following     No orders found for display       Primary Care Provider Office Phone # Fax #    Ruddy Malave [phone]       [facility] [address]        Equal Access to Services     GIULIANO MAHMOOD : Hadii wendy muniz Sojuanjo, waaxda luqadaha, qaybta kaalmada adeegyada, celestine rogers . So [facility] [phone].    ATENCIÓN: Si habla español, tiene a pedro disposición servicios gratuitos de asistencia lingüística. Jameson al [phone].    We comply with applicable federal civil rights laws and [place] laws. We do not discriminate on the basis of race, color, national origin, age, disability, sex, sexual orientation, or gender identity.            Thank you!     Thank you for choosing [facility]  for your care. Our goal is always to provide you with excellent care. Hearing back from our patients is one way we can continue to improve our services. Please take a few minutes to complete the written survey that you may receive in the mail after your visit with us. Thank you!             Your Updated Medication List - Protect others around you: Learn how to safely use, store and throw away your medicines at www.disposemymeds.org.          This list is accurate as of 2/5/18 11:01 AM.  Always use your most recent med list.                   Brand Name Dispense Instructions for use Diagnosis    ACE/ARB/ARNI NOT PRESCRIBED (INTENTIONAL)      by Other route continuous prn.        aspirin 81 MG tablet     100    ONE DAILY "        atorvastatin 10 MG tablet    LIPITOR    90 tablet    Take 1 tablet (10 mg) by mouth daily    Mixed hyperlipidemia       blood glucose monitoring meter device kit      Use daily        blood glucose test strip    ESTEVAN BREEZE 2    450 each    Test 5 times daily    Diabetes mellitus type 1 (H)       CLINPRO 5000 1.1 % Pste   Generic drug:  Sodium Fluoride      Apply  to affected area. Use once every night        glucagon 1 MG kit    GLUCAGON EMERGENCY    2 each    Use as directed    Type 1 diabetes mellitus without complication (H)       Injection Device for insulin Carmen    NOVOPEN JR (GREEN)    1 each    Use as directed with insulin    Diabetes mellitus, type 1       insulin glargine 100 UNIT/ML injection    LANTUS SOLOSTAR    15 mL    Inject Subcutaneous. Inject 11 units daily    Diabetes mellitus type 1 (H)       insulin pen needle 31G X 5 MM    B-D U/F    450 each    Use 5 daily or as directed.    Type 1 diabetes mellitus without complication (H)       loratadine 10 MG tablet    CLARITIN          NovoLOG PENFILL 100 UNIT/ML injection   Generic drug:  insulin aspart     30 mL    1 unit:20 grams of CHO with meals and snacks, and for pre-meal correction, approx 30units daily    Diabetes mellitus type 1 (H)       VITAMIN D (CHOLECALCIFEROL) PO      Take 1,000 Units by mouth daily

## 2018-04-18 DIAGNOSIS — E10.65 TYPE 1 DIABETES MELLITUS WITH HYPERGLYCEMIA (H): ICD-10-CM

## 2018-04-18 PROCEDURE — 82565 ASSAY OF CREATININE: CPT | Performed by: PHYSICIAN ASSISTANT

## 2018-04-18 PROCEDURE — 84443 ASSAY THYROID STIM HORMONE: CPT | Performed by: PHYSICIAN ASSISTANT

## 2018-04-18 PROCEDURE — 83516 IMMUNOASSAY NONANTIBODY: CPT | Performed by: PHYSICIAN ASSISTANT

## 2018-04-18 PROCEDURE — 82043 UR ALBUMIN QUANTITATIVE: CPT | Performed by: PHYSICIAN ASSISTANT

## 2018-04-18 PROCEDURE — 36415 COLL VENOUS BLD VENIPUNCTURE: CPT | Performed by: PHYSICIAN ASSISTANT

## 2018-04-19 LAB
CREAT SERPL-MCNC: 0.86 MG/DL (ref 0.66–1.25)
CREAT UR-MCNC: 99 MG/DL
GFR SERPL CREATININE-BSD FRML MDRD: >90 ML/MIN/1.7M2
MICROALBUMIN UR-MCNC: 5 MG/L
MICROALBUMIN/CREAT UR: 5.2 MG/G CR (ref 0–17)
TSH SERPL DL<=0.005 MIU/L-ACNC: 2.2 MU/L (ref 0.4–4)
TTG IGA SER-ACNC: 1 U/ML
TTG IGG SER-ACNC: 1 U/ML

## 2018-04-24 ENCOUNTER — OFFICE VISIT (OUTPATIENT)
Dept: ENDOCRINOLOGY | Facility: CLINIC | Age: 48
End: 2018-04-24
Payer: COMMERCIAL

## 2018-04-24 VITALS
HEIGHT: 70 IN | DIASTOLIC BLOOD PRESSURE: 72 MMHG | HEART RATE: 65 BPM | SYSTOLIC BLOOD PRESSURE: 115 MMHG | WEIGHT: 179.2 LBS | BODY MASS INDEX: 25.65 KG/M2

## 2018-04-24 DIAGNOSIS — Z13.21 ENCOUNTER FOR VITAMIN DEFICIENCY SCREENING: ICD-10-CM

## 2018-04-24 DIAGNOSIS — E10.9 TYPE 1 DIABETES MELLITUS WITHOUT COMPLICATION (H): Primary | ICD-10-CM

## 2018-04-24 LAB — HBA1C MFR BLD: 7.6 % (ref 4.3–6)

## 2018-04-24 ASSESSMENT — PAIN SCALES - GENERAL: PAINLEVEL: NO PAIN (0)

## 2018-04-24 NOTE — PROGRESS NOTES
"Endocrinology Clinic  2018  Franklin Muir     HPI:  Franklin is a 48 yo man here for follow up of type 1 diabetes.  He recently established care me after Dr. Swenson left, and he is now also seeing Liz Conroy in our clinic.      Type 1 diabetes was diagnosed in  ( and anti DEE > 250).     He is currently on Lantus to 11 units daily at lunchtime and Novolog 1 unit/ 20 grams of CHO with meals and snacks, and for pre-meal correction @ 1/2 U per 30 mg/dL above 150 mg/dL.  He is now exercising on  and  by jogging or doing cardio plus weight lifting at the gym.  Exercise activities last about 1 hour.     Last seen by Liz Conroy . A1C was 7.3 at that time, no changes to insulin regimen.     Today he reports things are largely \"status quo.\" No significant issues. Intermittently with lows, worst being into the 40s, usually precipitated by activity, always felt. Notes he can usually feel low in the 70s and 80s. He treats and the low will resolve.     Download from his meter shows patient is checking his blood glucose 4.3 times per day,  AM average is 154, std dev 50; midday avg 160 std dev 59, pm avg 148 st dev 52.   Highest recorded blood glucose level is 286, and the lowest is 57.  He develops hypoglycemic symptoms when blood glucose levels are about 70s/80s.    We reviewed all his blood glucose data together and identified a few instances when he may not be properly covering his carbohydrate intake. He notes that he typically takes his short acting insulin after he eats as it is hard to cover beforehand, not knowing how much he'll end up wanting to consume.       His brother also had type 1 diabetes and  of DKA, although Franklin reported today he  from hypoglycemia.      A1c is 7.6% today     Diabetes complications  Retinopathy: No recent vision changes and up to date with yearly eye exams, no DR in last opthalmology examination  Nephropathy: No nephropathy - negative " microalbuminuria and normal GFR  Neuropathy: No neuropathy  Macrovascular: His LDL was his slightly above 100 and patient was started on statin. Now under good control as of 3/2017 (, TG 46, HDL 51, LDL 46)     He also has a history of Hashimoto's ab positivity with normal function, has never been on levothyroxine replacement therapy. He generally has been feeling well and has no concerns today.         ROS   11 point ROS is as detailed in the HPI above, as per patient below, or negative    Answers for HPI/ROS submitted by the patient on 4/22/2018   General Symptoms: No  Skin Symptoms: No  HENT Symptoms: No  EYE SYMPTOMS: No  HEART SYMPTOMS: No  LUNG SYMPTOMS: No  INTESTINAL SYMPTOMS: No  URINARY SYMPTOMS: No  REPRODUCTIVE SYMPTOMS: No  SKELETAL SYMPTOMS: No  BLOOD SYMPTOMS: No  NERVOUS SYSTEM SYMPTOMS: No  MENTAL HEALTH SYMPTOMS: No        Past Medical History:   Diagnosis Date     Chronic lymphocytic thyroiditis 7/20/2011     Chronic lymphocytic thyroiditis 7/20/2011     Chronic lymphocytic thyroiditis      Depressive disorder 06/01/1997    Treated for a couple of years; not currently experiencing     Diabetes 6/23/2009       Past Surgical History:   Procedure Laterality Date     HC TOOTH EXTRACTION W/FORCEP         Family History   Problem Relation Age of Onset     Hyperlipidemia Mother      DIABETES Brother      Hypertension Paternal Grandfather      CEREBROVASCULAR DISEASE Paternal Grandfather      Hypertension Maternal Grandfather      Hyperlipidemia Maternal Grandfather      CEREBROVASCULAR DISEASE Maternal Grandfather      Breast Cancer Maternal Aunt      DIABETES Brother      Anxiety Disorder Brother      Coronary Artery Disease Other      Maternal great grandfather     Hypertension Maternal Grandmother      Hyperlipidemia Maternal Grandmother      Hyperlipidemia Brother      CEREBROVASCULAR DISEASE Paternal Grandmother      Asthma No family hx of      C.A.D. No family hx of      Cancer - colorectal  "No family hx of      Prostate Cancer No family hx of      Brother with T1D  at age 26 from DKA      Social History     Social History     Marital status:      Spouse name: N/A     Number of children: N/A     Years of education: N/A     Social History Main Topics     Smoking status: Never Smoker     Smokeless tobacco: Never Used     Alcohol use Yes      Comment: ~ 1 drink/day     Drug use: No     Sexual activity: Yes     Partners: Female     Birth control/ protection: Condom, Natural Family Planning      Comment: Would like to talk about vasectomy     Other Topics Concern     Parent/Sibling W/ Cabg, Mi Or Angioplasty Before 65f 55m? No     Social History Narrative   Social Hx:   .  Has a young son.  Works a  at the [employer].    Has an exchange student currently living at his house.    Current Outpatient Prescriptions   Medication     ACE/ARB NOT PRESCRIBED, INTENTIONAL,     ASPIRIN 81 MG OR TABS     atorvastatin (LIPITOR) 10 MG tablet     blood glucose (ESTEVAN BREEZE 2) test strip     Blood Glucose Monitoring Suppl (Global ActiveEZE 2 SYSTEM) W/DEVICE KIT     glucagon (GLUCAGON EMERGENCY) 1 MG injection     Injection Device for insulin (NOVOPEN JR, JAIDEN,) DUSTIN     insulin glargine (LANTUS SOLOSTAR) 100 UNIT/ML injection     insulin pen needle (B-D U/F) 31G X 5 MM     loratadine (CLARITIN) 10 MG tablet     NOVOLOG PENFILL 100 UNIT/ML soln     Sodium Fluoride (CLINPRO 5000) 1.1 % PSTE     VITAMIN D, CHOLECALCIFEROL, PO     No current facility-administered medications for this visit.           Allergies   Allergen Reactions     Ceftin Rash     Ampicillin Rash       Physical Exam  /72  Pulse 65  Ht 1.778 m (5' 10\")  Wt 81.3 kg (179 lb 3.2 oz)  BMI 25.71 kg/m2  Constitutional: Appears well-developed and well-nourished. Active.   HEENT: anicteric, normal extra-ocular movements, no lid lag or retraction. MMM.   Thyroid:  no obvious nodules on exam, nontender "   Cardiovascular: RRR, S1, S2 normal. No m/g/r   Pulmonary/Chest: Normal effort on RA. CTAB. No wheezing or rales   Abdominal: soft, NT. Normoactive bowel sounds.   Neurological: Alert. Speech fluent and articulate. No focal deficits on limited exam.   Extremities: No clubbing, cyanosis or inflammation. No LE edema.   Skin: Warm, dry. No rashes anywhere visualized.   Lymphatic: no cervical lymphadenopathy.  Psychological: appropriate mood and affect         Labs/Data   Data reviewed  4/18/2018 Creatinine 0.86          TSH 2.20    A1C today 7.6 (7.3 on 2/5/2018, 7.3 10/31 2017, 6.9 7/10/2017)    11/2009 TPO 70      Assessment/Plan:   Franklin Muir is a very pleasant 47 year-old male patient with a 8 years history of type 1 diabetes, with reasonable glycemic control and no chronic complications    1.  Type 1 diabetes:   Overall, very well controlled, but with some room for improvement. Discussed wearing a sensor for a few weeks to determine where trends are, he is agreeable.    - plan to follow up with Liz in 2 weeks or phone visit with Dr. Wilson if unable to see Liz.     2.  Chronic complications of diabetes:  There is no evidence of chronic diabetic complications.   Retinopathy:  None, last eye exam was normal.   Nephropathy:  BP well controlled. No microalbuminuria.  Normal GFR.   Neuropathy: None.    Feet: OK, no ulcers.   Lipids:  LDL at target.  Patient taking statin    3. Health maintenance: Will check vitamin D, fasting lipid panel, and CBC when he returns for sensor follow up       Orders Placed This Encounter   Procedures     25 Hydroxyvitamin D2 and D3     Lipid Profile     CBC with platelets       F/U as noted above        Patient seen and discussed with Dr. Wilson.     Courtney Malave MD  Internal Medicine PGY2     ATTENDING NOTE  I have seen and examined the patient, reviewed and edited the fellow's note, and agree with the plan of care.    Freya Wilson MD PhD    Division of  Endocrinology and Diabetes

## 2018-04-24 NOTE — LETTER
"2018       RE: Franklin Muir  [address]  [place] [address]     Dear Colleague,    Thank you for referring your patient, Franklin Muir, to the [facility] ENDOCRINOLOGY at [facility]. Please see a copy of my visit note below.    Endocrinology Clinic  2018  Franklin Muir     HPI:  Franklin is a 48 yo man here for follow up of type 1 diabetes.  He recently established care me after Dr. Swenson left, and he is now also seeing Liz Conroy in our clinic.      Type 1 diabetes was diagnosed in  ( and anti DEE > 250).     He is currently on Lantus to 11 units daily at lunchtime and Novolog 1 unit/ 20 grams of CHO with meals and snacks, and for pre-meal correction @ 1/2 U per 30 mg/dL above 150 mg/dL.  He is now exercising on  and  by jogging or doing cardio plus weight lifting at the gym.  Exercise activities last about 1 hour.     Last seen by Liz Conroy . A1C was 7.3 at that time, no changes to insulin regimen.     Today he reports things are largely \"status quo.\" No significant issues. Intermittently with lows, worst being into the 40s, usually precipitated by activity, always felt. Notes he can usually feel low in the 70s and 80s. He treats and the low will resolve.     Download from his meter shows patient is checking his blood glucose 4.3 times per day,  AM average is 154, std dev 50; midday avg 160 std dev 59, pm avg 148 st dev 52.   Highest recorded blood glucose level is 286, and the lowest is 57.  He develops hypoglycemic symptoms when blood glucose levels are about 70s/80s.    We reviewed all his blood glucose data together and identified a few instances when he may not be properly covering his carbohydrate intake. He notes that he typically takes his short acting insulin after he eats as it is hard to cover beforehand, not knowing how much he'll end up wanting to consume.       His brother also had type 1 diabetes and  " of DKA, although Franklin reported today he  from hypoglycemia.      A1c is 7.6% today     Diabetes complications  Retinopathy: No recent vision changes and up to date with yearly eye exams, no DR in last opthalmology examination  Nephropathy: No nephropathy - negative microalbuminuria and normal GFR  Neuropathy: No neuropathy  Macrovascular: His LDL was his slightly above 100 and patient was started on statin. Now under good control as of 3/2017 (, TG 46, HDL 51, LDL 46)     He also has a history of Hashimoto's ab positivity with normal function, has never been on levothyroxine replacement therapy. He generally has been feeling well and has no concerns today.         ROS   11 point ROS is as detailed in the HPI above, as per patient below, or negative      Past Medical History:   Diagnosis Date     Chronic lymphocytic thyroiditis 2011     Chronic lymphocytic thyroiditis 2011     Chronic lymphocytic thyroiditis      Depressive disorder 1997    Treated for a couple of years; not currently experiencing     Diabetes 2009       Past Surgical History:   Procedure Laterality Date     HC TOOTH EXTRACTION W/FORCEP         Family History   Problem Relation Age of Onset     Hyperlipidemia Mother      DIABETES Brother      Hypertension Paternal Grandfather      CEREBROVASCULAR DISEASE Paternal Grandfather      Hypertension Maternal Grandfather      Hyperlipidemia Maternal Grandfather      CEREBROVASCULAR DISEASE Maternal Grandfather      Breast Cancer Maternal Aunt      DIABETES Brother      Anxiety Disorder Brother      Coronary Artery Disease Other      Maternal great grandfather     Hypertension Maternal Grandmother      Hyperlipidemia Maternal Grandmother      Hyperlipidemia Brother      CEREBROVASCULAR DISEASE Paternal Grandmother      Asthma No family hx of      C.A.D. No family hx of      Cancer - colorectal No family hx of      Prostate Cancer No family hx of      Brother with T1D   "at age 26 from DKA      Social History     Social History     Marital status:      Spouse name: N/A     Number of children: N/A     Years of education: N/A     Social History Main Topics     Smoking status: Never Smoker     Smokeless tobacco: Never Used     Alcohol use Yes      Comment: ~ 1 drink/day     Drug use: No     Sexual activity: Yes     Partners: Female     Birth control/ protection: Condom, Natural Family Planning      Comment: Would like to talk about vasectomy     Other Topics Concern     Parent/Sibling W/ Cabg, Mi Or Angioplasty Before 65f 55m? No     Social History Narrative   Social Hx:   .  Has a young son.  Works a  at the [employer].    Has an exchange student currently living at his house.    Current Outpatient Prescriptions   Medication     ACE/ARB NOT PRESCRIBED, INTENTIONAL,     ASPIRIN 81 MG OR TABS     atorvastatin (LIPITOR) 10 MG tablet     blood glucose (ESTEVAN BREEZE 2) test strip     Blood Glucose Monitoring Suppl (PetpaceEZE 2 SYSTEM) W/DEVICE KIT     glucagon (GLUCAGON EMERGENCY) 1 MG injection     Injection Device for insulin (NOVOPEN JR, JAIDEN,) DUSTIN     insulin glargine (LANTUS SOLOSTAR) 100 UNIT/ML injection     insulin pen needle (B-D U/F) 31G X 5 MM     loratadine (CLARITIN) 10 MG tablet     NOVOLOG PENFILL 100 UNIT/ML soln     Sodium Fluoride (CLINPRO 5000) 1.1 % PSTE     VITAMIN D, CHOLECALCIFEROL, PO     No current facility-administered medications for this visit.           Allergies   Allergen Reactions     Ceftin Rash     Ampicillin Rash       Physical Exam  /72  Pulse 65  Ht 1.778 m (5' 10\")  Wt 81.3 kg (179 lb 3.2 oz)  BMI 25.71 kg/m2  Constitutional: Appears well-developed and well-nourished. Active.   HEENT: anicteric, normal extra-ocular movements, no lid lag or retraction. MMM.   Thyroid:   no obvious nodules on exam, nontender   Cardiovascular: RRR, S1, S2 normal. No m/g/r   Pulmonary/Chest:  Normal effort on RA. " CTAB. No wheezing or rales   Abdominal:  soft, NT. Normoactive bowel sounds.   Neurological: Alert.  Speech fluent and articulate. No focal deficits on limited exam.   Extremities: No clubbing, cyanosis or inflammation. No LE edema.   Skin: Warm, dry. No rashes anywhere visualized.   Lymphatic: no cervical lymphadenopathy.  Psychological: appropriate mood and affect         Labs/Data   Data reviewed  4/18/2018 Creatinine 0.86          TSH 2.20    A1C today 7.6 (7.3 on 2/5/2018, 7.3 10/31 2017, 6.9 7/10/2017)    11/2009 TPO 70      Assessment/Plan:   Franklin Muir is a very pleasant 47 year-old male patient with a 8 years history of type 1 diabetes, with reasonable glycemic control and no chronic complications    1.  Type 1 diabetes:   Overall, very well controlled, but with some room for improvement. Discussed wearing a sensor for a few weeks to determine where trends are, he is agreeable.    - plan to follow up with Liz in 2 weeks or phone visit with Dr. Wilson if unable to see Liz.     2.  Chronic complications of diabetes:  There is no evidence of chronic diabetic complications.   Retinopathy:  None, last eye exam was normal.   Nephropathy:  BP well controlled. No microalbuminuria.  Normal GFR.   Neuropathy: None.    Feet: OK, no ulcers.   Lipids:  LDL at target.  Patient taking statin    3. Health maintenance: Will check vitamin D, fasting lipid panel, and CBC when he returns for sensor follow up       Orders Placed This Encounter   Procedures     25 Hydroxyvitamin D2 and D3     Lipid Profile     CBC with platelets       F/U as noted above        Patient seen and discussed with Dr. Wilson.     Courtney Malave MD  Internal Medicine PGY2     ATTENDING NOTE  I have seen and examined the patient, reviewed and edited the fellow's note, and agree with the plan of care.    Freya Wilson MD PhD    Division of Endocrinology and Diabetes

## 2018-04-24 NOTE — MR AVS SNAPSHOT
After Visit Summary   4/24/2018    Franklin Muir    MRN: [id number]           Patient Information     Date Of Birth          1970        Visit Information        Provider Department      4/24/2018 8:30 AM CECILY Wilson MD  [facility]        Today's Diagnoses     Type 1 diabetes mellitus without complication (H)    -  1    Encounter for vitamin deficiency screening           Follow-ups after your visit        Follow-up notes from your care team     Return in about 3 months (around 7/24/2018).      Your next 10 appointments already scheduled     May 07, 2018  9:30 AM CDT   (Arrive by 9:15 AM)   RETURN DIABETES with [facility] ([facility] [place])    [address]  [address] [place] [place] [facility] [address] [phone]              Future tests that were ordered for you today     Open Future Orders        Priority Expected Expires Ordered    25 Hydroxyvitamin D2 and D3 Routine  4/24/2019 4/24/2018    Lipid Profile Routine  4/24/2019 4/24/2018    CBC with platelets Routine  4/24/2019 4/24/2018            Who to contact     Please call your clinic at [phone] to:    Ask questions about your health    Make or cancel appointments    Discuss your medicines    Learn about your test results    Speak to your doctor            Additional Information About Your Visit        [email or web address] Information     [employer] gives you secure access to your electronic health record. If you see a primary care provider, you can also send messages to your care team and make appointments. If you have questions, please call your primary care clinic.  If you do not have a primary care provider, please call [phone] and they will assist you.      [employer] is an electronic gateway that provides easy, online access to your medical records. With [employer], you can request a clinic appointment, read your test results, renew a prescription or communicate  "with your care team.     To access your existing account, please contact your [facility] or call [phone] for assistance.        Care EveryWhere ID     This is your Care EveryWhere ID. This could be used by other organizations to access your [facility] medical records  [id number]        Your Vitals Were     Pulse Height BMI (Body Mass Index)             65 1.778 m (5' 10\") 25.71 kg/m2          Blood Pressure from Last 3 Encounters:   04/24/18 115/72   02/05/18 113/73   10/31/17 120/76    Weight from Last 3 Encounters:   04/24/18 81.3 kg (179 lb 3.2 oz)   02/05/18 80 kg (176 lb 4.8 oz)   10/31/17 79.8 kg (176 lb)              We Performed the Following     GLUCOSE MONITORING CONTINUOUS, >=72 HR        Primary Care Provider Office Phone # Fax #    Ruddy Malave [phone]       [address] [address]        Equal Access to Services     GIULIANO MAHMOOD : Hadii aad ku hadasho Soomaali, waaxda luqadaha, qaybta kaalmada adeegyada, waxay idiin haymicki rogers . So [facility] [phone].    ATENCIÓN: Si habla español, tiene a pedro disposición servicios gratuitos de asistencia lingüística. Llame al [phone].    We comply with applicable federal civil rights laws and [place] laws. We do not discriminate on the basis of race, color, national origin, age, disability, sex, sexual orientation, or gender identity.            Thank you!     Thank you for choosing [facility] ENDOCRINOLOGY  for your care. Our goal is always to provide you with excellent care. Hearing back from our patients is one way we can continue to improve our services. Please take a few minutes to complete the written survey that you may receive in the mail after your visit with us. Thank you!             Your Updated Medication List - Protect others around you: Learn how to safely use, store and throw away your medicines at www.disposemymeds.org.          This list is " accurate as of 4/24/18 11:54 AM.  Always use your most recent med list.                   Brand Name Dispense Instructions for use Diagnosis    ACE/ARB/ARNI NOT PRESCRIBED (INTENTIONAL)      by Other route continuous prn.        aspirin 81 MG tablet     100    ONE DAILY        atorvastatin 10 MG tablet    LIPITOR    90 tablet    Take 1 tablet (10 mg) by mouth daily    Mixed hyperlipidemia       blood glucose monitoring meter device kit      Use daily        blood glucose test strip    ESTEVAN BREEZE 2    450 each    Test 5 times daily    Diabetes mellitus type 1 (H)       CLINPRO 5000 1.1 % Pste   Generic drug:  Sodium Fluoride      Apply  to affected area. Use once every night        glucagon 1 MG kit    GLUCAGON EMERGENCY    2 each    Use as directed    Type 1 diabetes mellitus without complication (H)       Injection Device for insulin Carmen    NOVOPEN JR (GREEN)    1 each    Use as directed with insulin    Diabetes mellitus, type 1       insulin glargine 100 UNIT/ML injection    LANTUS SOLOSTAR    15 mL    Inject Subcutaneous. Inject 11 units daily    Diabetes mellitus type 1 (H)       insulin pen needle 31G X 5 MM    B-D U/F    450 each    Use 5 daily or as directed.    Type 1 diabetes mellitus without complication (H)       loratadine 10 MG tablet    CLARITIN          NovoLOG PENFILL 100 UNIT/ML injection   Generic drug:  insulin aspart     30 mL    1 unit:20 grams of CHO with meals and snacks, and for pre-meal correction, approx 30units daily    Diabetes mellitus type 1 (H)       VITAMIN D (CHOLECALCIFEROL) PO      Take 1,000 Units by mouth daily

## 2018-04-24 NOTE — NURSING NOTE
Chief Complaint   Patient presents with     RECHECK     return diabetes type 1      Sandra Lira CMA

## 2018-05-04 DIAGNOSIS — Z13.21 ENCOUNTER FOR VITAMIN DEFICIENCY SCREENING: ICD-10-CM

## 2018-05-04 DIAGNOSIS — E10.9 TYPE 1 DIABETES MELLITUS WITHOUT COMPLICATION (H): ICD-10-CM

## 2018-05-04 LAB
ERYTHROCYTE [DISTWIDTH] IN BLOOD BY AUTOMATED COUNT: 13.2 % (ref 10–15)
HCT VFR BLD AUTO: 40.3 % (ref 40–53)
HGB BLD-MCNC: 13.9 G/DL (ref 13.3–17.7)
MCH RBC QN AUTO: 27.2 PG (ref 26.5–33)
MCHC RBC AUTO-ENTMCNC: 34.5 G/DL (ref 31.5–36.5)
MCV RBC AUTO: 79 FL (ref 78–100)
PLATELET # BLD AUTO: 204 10E9/L (ref 150–450)
RBC # BLD AUTO: 5.11 10E12/L (ref 4.4–5.9)
WBC # BLD AUTO: 4.9 10E9/L (ref 4–11)

## 2018-05-04 PROCEDURE — 82306 VITAMIN D 25 HYDROXY: CPT | Performed by: INTERNAL MEDICINE

## 2018-05-04 PROCEDURE — 36415 COLL VENOUS BLD VENIPUNCTURE: CPT | Performed by: INTERNAL MEDICINE

## 2018-05-04 PROCEDURE — 80061 LIPID PANEL: CPT | Performed by: INTERNAL MEDICINE

## 2018-05-04 PROCEDURE — 85027 COMPLETE CBC AUTOMATED: CPT | Performed by: INTERNAL MEDICINE

## 2018-05-05 LAB
CHOLEST SERPL-MCNC: 121 MG/DL
HDLC SERPL-MCNC: 50 MG/DL
LDLC SERPL CALC-MCNC: 66 MG/DL
NONHDLC SERPL-MCNC: 71 MG/DL
TRIGL SERPL-MCNC: 23 MG/DL

## 2018-05-07 ENCOUNTER — OFFICE VISIT (OUTPATIENT)
Dept: ENDOCRINOLOGY | Facility: CLINIC | Age: 48
End: 2018-05-07
Payer: COMMERCIAL

## 2018-05-07 VITALS
SYSTOLIC BLOOD PRESSURE: 125 MMHG | DIASTOLIC BLOOD PRESSURE: 76 MMHG | HEIGHT: 70 IN | HEART RATE: 71 BPM | BODY MASS INDEX: 25.37 KG/M2 | WEIGHT: 177.2 LBS

## 2018-05-07 DIAGNOSIS — E10.9 WELL CONTROLLED TYPE 1 DIABETES MELLITUS (H): Primary | ICD-10-CM

## 2018-05-07 LAB
DEPRECATED CALCIDIOL+CALCIFEROL SERPL-MC: <48 UG/L (ref 20–75)
VITAMIN D2 SERPL-MCNC: <5 UG/L
VITAMIN D3 SERPL-MCNC: 43 UG/L

## 2018-05-07 NOTE — MR AVS SNAPSHOT
After Visit Summary   5/7/2018    Franklin Muir    MRN: [id number]           Patient Information     Date Of Birth          1970        Visit Information        Provider Department      5/7/2018 9:30 AM Liz Conroy PA-C [facility]        Today's Diagnoses     Well controlled type 1 diabetes mellitus (H)    -  1      Care Instructions    Reduce Lantus from 10 units to 9 units.     Tighten insulin:carb ratio to 1/18g at breakfast and lunch, continue 1/20g at dinner.      Work on taking the insulin ASAP after eating     Diabetes:M- check out the vikash.     Consider personal dexcom sensor.                Follow-ups after your visit        Follow-up notes from your care team     Return in about 3 months (around 8/7/2018).      Your next 10 appointments already scheduled     Aug 13, 2018  9:30 AM CDT   (Arrive by 9:15 AM)   RETURN DIABETES with MARK [facility] ([facility])    [address] [place] [address] [phone]            Nov 13, 2018  9:30 AM CST   (Arrive by 9:15 AM)   RETURN DIABETES with MD CECILY Anthony [facility] ([facility])    [address] [place] [address] [phone]              Who to contact     Please call your clinic at [phone] to:    Ask questions about your health    Make or cancel appointments    Discuss your medicines    Learn about your test results    Speak to your doctor            Additional Information About Your Visit        MyChart Information     Aston[employer] gives you secure access to your electronic health record. If you see a primary care provider, you can also send messages to your care team and make appointments. If you have questions, please call your primary care clinic.  If you do not have a primary care provider, please call [phone] and they will assist you.      [email or web address] is  "an electronic gateway that provides easy, online access to your medical records. With Veritext, you can request a clinic appointment, read your test results, renew a prescription or communicate with your care team.     To access your existing account, please contact your [facility] or call [phone] for assistance.        Care EveryWhere ID     This is your Care EveryWhere ID. This could be used by other organizations to access your [place] medical records  [id number]        Your Vitals Were     Pulse Height BMI (Body Mass Index)             71 1.778 m (5' 10\") 25.43 kg/m2          Blood Pressure from Last 3 Encounters:   05/07/18 125/76   04/24/18 115/72   02/05/18 113/73    Weight from Last 3 Encounters:   05/07/18 80.4 kg (177 lb 3.2 oz)   04/24/18 81.3 kg (179 lb 3.2 oz)   02/05/18 80 kg (176 lb 4.8 oz)              Today, you had the following     No orders found for display       Primary Care Provider Office Phone # Fax #    Ruddy Malave [phone]       [address] [address]        Equal Access to Services     GIULIANO MAHMOOD AH: Hadii wendy payneo Sojuanjo, waaxda luqadaha, qaybta kaalmada adeegyada, celestine sweet. So [facility] [phone].    ATENCIÓN: Si habla español, tiene a pedro disposición servicios gratuitos de asistencia lingüística. [facility] [phone].    We comply with applicable federal civil rights laws and [place] laws. We do not discriminate on the basis of race, color, national origin, age, disability, sex, sexual orientation, or gender identity.            Thank you!     Thank you for choosing [facility] ENDOCRIN[facility]  for your care. Our goal is always to provide you with excellent care. Hearing back from our patients is one way we can continue to improve our services. Please take a few minutes to complete the written survey that you may receive in the mail after your visit with us. Thank " you!             Your Updated Medication List - Protect others around you: Learn how to safely use, store and throw away your medicines at www.disposemymeds.org.          This list is accurate as of 5/7/18 10:32 AM.  Always use your most recent med list.                   Brand Name Dispense Instructions for use Diagnosis    ACE/ARB/ARNI NOT PRESCRIBED (INTENTIONAL)      by Other route continuous prn.        aspirin 81 MG tablet     100    ONE DAILY        atorvastatin 10 MG tablet    LIPITOR    90 tablet    Take 1 tablet (10 mg) by mouth daily    Mixed hyperlipidemia       blood glucose monitoring meter device kit      Use daily        blood glucose test strip    ESTEVAN BREEZE 2    450 each    Test 5 times daily    Diabetes mellitus type 1 (H)       CLINPRO 5000 1.1 % Pste   Generic drug:  Sodium Fluoride      Apply  to affected area. Use once every night        glucagon 1 MG kit    GLUCAGON EMERGENCY    2 each    Use as directed    Type 1 diabetes mellitus without complication (H)       Injection Device for insulin Caremn    NOVOPEN JR (GREEN)    1 each    Use as directed with insulin    Diabetes mellitus, type 1       insulin glargine 100 UNIT/ML injection    LANTUS SOLOSTAR    15 mL    Inject Subcutaneous. Inject 11 units daily    Diabetes mellitus type 1 (H)       insulin pen needle 31G X 5 MM    B-D U/F    450 each    Use 5 daily or as directed.    Type 1 diabetes mellitus without complication (H)       loratadine 10 MG tablet    CLARITIN          NovoLOG PENFILL 100 UNIT/ML injection   Generic drug:  insulin aspart     30 mL    1 unit:20 grams of CHO with meals and snacks, and for pre-meal correction, approx 30units daily    Diabetes mellitus type 1 (H)       VITAMIN D (CHOLECALCIFEROL) PO      Take 1,000 Units by mouth daily

## 2018-05-07 NOTE — LETTER
2018       RE: Franklin Muir  [address]  [place] [address]     Dear Colleague,    Thank you for referring your patient, Franklin Muir, to the [facility] ENDOCRINOLOGY at [facility]. Please see a copy of my visit note below.    HPI:   Franklin is a 46 yo man here for follow up of type 1 diabetes.  He also sees Dr. Wilson. He was here to see Dr. Wilson a couple weeks ago and she wanted him to wear a carrington sensor.  He is here to review this today.      Sensor review:   Patient wore a Dexcom sensor for 13 days.   Overall average glucose: 191 mg/dL  Undetected hypoglycemia: Yes  Nocturnal hypoglycemia: No  Patterns: high after meals, drifting lower overnight.     He reports that things have been going well, and he is no longer having much hypoglycemia.  He is currently on Lantus to 10 units daily at 7 AM and Novolog 1 unit/20 grams of CHO with meals and snacks, and for pre-meal correction @ 1/2 U per 30 mg/dL above 150 during the day and over 200 at HS.     Download from his meter shows patient is checking his blood glucose 4 times per day, average is 156 mg/dL.  He does have strong symptoms alerting him to hypoglycemia.  His brother also had type 1 diabetes and  of DKA.    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  He does cardio and weight lifting, basketball, mostly in the mid-day.  His glucose is quite stable on physically active days.     He also has a history of Hashimoto's ab positivity with normal function, has never been on levothyroxine replacement therapy. He generally has been feeling well and has no concerns today.     ROS   GENERAL: no weight loss, weight gain, fevers, chills, malaise, night sweats.   HEENT: no dysphagia, diplopia, neck pain or tenderness, dry/scratchy eyes, URI, cough, sinus drainage, tinnitus, sinus pressure  CV: no chest pain, pressure,  palpitations, skipped beats, LOC  LUNGS: no SOB, FOUNTAIN, cough, sputum production, wheezing   GI: no diarrhea, constipation, abdominal pain  EXTREMITIES: no rashes, ulcers, edema  NEUROLOGY: no changes in vision, tingling or numbness in hands or feet.   MSK: no muscle aches or pains, weakness  PSYCH: mood stable    Past Medical History:   Diagnosis Date     Chronic lymphocytic thyroiditis 7/20/2011     Chronic lymphocytic thyroiditis 7/20/2011     Chronic lymphocytic thyroiditis      Depressive disorder 06/01/1997    Treated for a couple of years; not currently experiencing     Diabetes 6/23/2009     Past Surgical History:   Procedure Laterality Date     HC TOOTH EXTRACTION W/FORCEP       Family History   Problem Relation Age of Onset     Hyperlipidemia Mother      DIABETES Brother      Hypertension Paternal Grandfather      CEREBROVASCULAR DISEASE Paternal Grandfather      Hypertension Maternal Grandfather      Hyperlipidemia Maternal Grandfather      CEREBROVASCULAR DISEASE Maternal Grandfather      Breast Cancer Maternal Aunt      DIABETES Brother      Anxiety Disorder Brother      Coronary Artery Disease Other      Maternal great grandfather     Hypertension Maternal Grandmother      Hyperlipidemia Maternal Grandmother      Hyperlipidemia Brother      CEREBROVASCULAR DISEASE Paternal Grandmother      Asthma No family hx of      C.A.D. No family hx of      Cancer - colorectal No family hx of      Prostate Cancer No family hx of        Social History     Social History     Marital status:      Spouse name: N/A     Number of children: N/A     Years of education: N/A     Social History Main Topics     Smoking status: Never Smoker     Smokeless tobacco: Never Used     Alcohol use Yes      Comment: ~ 1 drink/day     Drug use: No     Sexual activity: Yes     Partners: Female     Birth control/ protection: Condom, Natural Family Planning      Comment: Would like to talk about vasectomy     Other Topics Concern      "Parent/Sibling W/ Cabg, Mi Or Angioplasty Before 65f 55m? No     Social History Narrative   Social Hx:   .  Has a young son.  Works a  at the [employer].  Currently on sabbatical. Physically active.    Current Outpatient Prescriptions   Medication     ACE/ARB NOT PRESCRIBED, INTENTIONAL,     ASPIRIN 81 MG OR TABS     atorvastatin (LIPITOR) 10 MG tablet     blood glucose (ESTEVAN BREEZE 2) test strip     Blood Glucose Monitoring Suppl ([employer] BREEZE 2 SYSTEM) W/DEVICE KIT     glucagon (GLUCAGON EMERGENCY) 1 MG injection     Injection Device for insulin (NOVOPEN JRJAIDEN,) DUSTIN     insulin glargine (LANTUS SOLOSTAR) 100 UNIT/ML injection     insulin pen needle (B-D U/F) 31G X 5 MM     loratadine (CLARITIN) 10 MG tablet     NOVOLOG PENFILL 100 UNIT/ML soln     Sodium Fluoride (CLINPRO 5000) 1.1 % PSTE     VITAMIN D, CHOLECALCIFEROL, PO     No current facility-administered medications for this visit.      Allergies   Allergen Reactions     Ceftin Rash     Ampicillin Rash     Physical Exam  /76 (BP Location: Left arm)  Pulse 71  Ht 1.778 m (5' 10\")  Wt 80.4 kg (177 lb 3.2 oz)  BMI 25.43 kg/m2  GENERAL:  Alert and oriented X3, NAD, well dressed, answering questions appropriately, appears stated age.  EXTREMITIES: no edema, +pulses, no rashes, no lesions      RESULTS  Lab Results   Component Value Date    A1C 8.4 (H) 03/13/2017    A1C 7.6 (H) 02/18/2016    A1C 7.8 (H) 07/07/2015    A1C 6.9 (H) 12/03/2013    A1C 6.9 (A) 09/25/2013    HEMOGLOBINA1 7.6 (A) 04/24/2018    HEMOGLOBINA1 7.3 (A) 02/05/2018    HEMOGLOBINA1 7.3 (A) 10/31/2017    HEMOGLOBINA1 6.9 (A) 07/10/2017    HEMOGLOBINA1 7.7 (A) 09/21/2016       TSH   Date Value Ref Range Status   04/18/2018 2.20 0.40 - 4.00 mU/L Final   03/13/2017 2.81 0.40 - 4.00 mU/L Final   02/18/2016 3.26 0.40 - 4.00 mU/L Final   07/07/2015 1.70 0.40 - 4.00 mU/L Final   01/05/2015 1.71 0.40 - 4.00 mU/L Final     Comment:     Effective " 7/30/2014, the reference range for this assay has changed to reflect   new instrumentation/methodology.       T4 Total   Date Value Ref Range Status   09/28/2010 9.0 5.0 - 11.0 ug/dL Final     T4 Free   Date Value Ref Range Status   02/18/2016 1.06 0.76 - 1.46 ng/dL Final   07/07/2015 1.04 0.76 - 1.46 ng/dL Final   01/05/2015 1.02 0.76 - 1.46 ng/dL Final     Comment:     Effective 7/30/2014, the reference range for this assay has changed to reflect   new instrumentation/methodology.     12/03/2013 1.08 0.70 - 1.85 ng/dL Final   01/26/2011 1.10 0.70 - 1.85 ng/dL Final       ALT   Date Value Ref Range Status   03/13/2017 24 0 - 70 U/L Final   02/18/2016 24 0 - 70 U/L Final   ]    Recent Labs   Lab Test  05/04/18   1016  03/13/17   0851   01/05/15   0933  12/03/13   0907   CHOL  121  106   < >  134  163   HDL  50  51   < >  47  43   LDL  66  46   < >  76  108   TRIG  23  46   < >  57  57   CHOLHDLRATIO   --    --    --   2.9  3.8    < > = values in this interval not displayed.       Lab Results   Component Value Date     03/13/2017      Lab Results   Component Value Date    POTASSIUM 4.3 03/13/2017     Lab Results   Component Value Date    CHLORIDE 102 03/13/2017     Lab Results   Component Value Date    SHANIKA 9.1 03/13/2017     Lab Results   Component Value Date    CO2 31 03/13/2017     Lab Results   Component Value Date    BUN 15 03/13/2017     Lab Results   Component Value Date    CR 0.86 04/18/2018       GFR Estimate   Date Value Ref Range Status   04/18/2018 >90 >60 mL/min/1.7m2 Final     Comment:     Non  GFR Calc   03/13/2017 >90  Non  GFR Calc   >60 mL/min/1.7m2 Final   02/18/2016 >90  Non  GFR Calc   >60 mL/min/1.7m2 Final     GFR Estimate If Black   Date Value Ref Range Status   04/18/2018 >90 >60 mL/min/1.7m2 Final     Comment:      GFR Calc   03/13/2017 >90   GFR Calc   >60 mL/min/1.7m2 Final   02/18/2016 >90    "American GFR Calc   >60 mL/min/1.7m2 Final       Lab Results   Component Value Date    MICROL 5 04/18/2018     No results found for: MICROALBUMIN  Lab Results   Component Value Date    CPEPT 2.3 11/19/2009    GADAB >250.0 (H) 11/19/2009    ISCAB  09/08/2009     1:16  Reference range: <1:4  (Note)  TEST INFORMATION: Islet Cell Ab, IgG  Islet cell antibodies have been associated with  \"autoimmune\" endocrine disorders and insulin-dependent  diabetes. This disorder is characterized by the presence  of antibodies in patients that may be detected years  before the onset of the clinical symptoms. To calculate  Juvenile Diabetes Foundation (JDF) units: multiply the  titer x 5 (1:8  8 x 5 = 40 JDF Units).  Performed by [employer],  [address] [place] [id number] [phone]  [email or web address], Evelia Miles MD, Lab. Director       No results found for: B12]    Most recent eye exam date: : 11/18/2015   Most recent eye exam date: : 11/18/2015     Eye exam in December, 2017- no retinopathy    Assessment/Plan:   1.  Type 1 diabetes- Overall, doing well, but climbing too high post-meal, but drifting low overnight.  Will make the following changes (instructions given to patient):   Reduce Lantus from 10 units to 9 units.     Tighten insulin:carb ratio to 1/18g at breakfast and lunch, continue 1/20g at dinner.      Work on taking the insulin ASAP after eating     Diabetes:M- check out the vikash.     Consider personal dexcom sensor.      2.  Risk factors-     Retinopathy:  No.  Had eye exam within last year.   Nephropathy:  BP well controlled. No microalbuminuria.  Creatinine stable.   Neuropathy: No.    Feet: OK, no ulcers.   Lipids:  LDL at target.  Patient taking statin  Screening: Will screen for celiac and TSH.     3.  F/U in 3 months with me, in 6 months with Dr. Wilson.     I spent 30 minutes with this patient face to face and explained the conditions and plans (more than 50% of time was counseling/coordination of " care, diabetes management, follow up plan for worsening hyper and hypoglycemia) . The patient understood and is satisfied with today's visit.      Liz Conroy PA-C, MPAS   [facility]  Department of Medicine  Division of Endocrinology and Diabetes

## 2018-05-07 NOTE — PATIENT INSTRUCTIONS
Reduce Lantus from 10 units to 9 units.     Tighten insulin:carb ratio to 1/18g at breakfast and lunch, continue 1/20g at dinner.      Work on taking the insulin ASAP after eating     Diabetes:M- check out the vikash.     Consider personal dexcom sensor.

## 2018-05-11 DIAGNOSIS — E10.9 DIABETES MELLITUS TYPE 1 (H): ICD-10-CM

## 2018-05-21 ENCOUNTER — MYC MEDICAL ADVICE (OUTPATIENT)
Dept: ENDOCRINOLOGY | Facility: CLINIC | Age: 48
End: 2018-05-21

## 2018-05-21 DIAGNOSIS — E10.9 TYPE 1 DIABETES MELLITUS WITHOUT COMPLICATION (H): ICD-10-CM

## 2018-06-07 ENCOUNTER — TELEPHONE (OUTPATIENT)
Dept: ENDOCRINOLOGY | Facility: CLINIC | Age: 48
End: 2018-06-07
Payer: COMMERCIAL

## 2018-06-07 NOTE — TELEPHONE ENCOUNTER
[facility]    Phone Message    May a detailed message be left on voicemail: yes    Reason for Call: Medication Refill Request    Has the patient contacted the pharmacy for the refill? Yes   Name of medication being requested: atorvastatin (LIPITOR) 10 MG tablet  Provider who prescribed the medication: Aneudy  Pharmacy: [facility] [address] [place], [place] - [address] [facility]  Date medication is needed: ASAP      Action Taken: Message routed to:  [facility] ([facility] FLOR

## 2018-06-08 DIAGNOSIS — E78.2 MIXED HYPERLIPIDEMIA: ICD-10-CM

## 2018-06-08 RX ORDER — ATORVASTATIN CALCIUM 10 MG/1
10 TABLET, FILM COATED ORAL DAILY
Qty: 90 TABLET | Refills: 3 | Status: SHIPPED | OUTPATIENT
Start: 2018-06-08 | End: 2019-06-02

## 2018-07-06 ENCOUNTER — MEDICAL CORRESPONDENCE (OUTPATIENT)
Dept: HEALTH INFORMATION MANAGEMENT | Facility: CLINIC | Age: 48
End: 2018-07-06

## 2018-07-09 ENCOUNTER — DOCUMENTATION ONLY (OUTPATIENT)
Dept: ENDOCRINOLOGY | Facility: CLINIC | Age: 48
End: 2018-07-09

## 2018-07-09 NOTE — PROGRESS NOTES
[facility] CLINICAL DOCUMENTATION    Form Documentation Form or Letter Request    Type or form/letter needing completion: Dexcom CMN   Provider: ASAP  Has provider seen patient for office visit related to reason for form request? Yes  Date form needed: ASAP  Once completed: Fax form to: Dexcom

## 2018-07-24 ENCOUNTER — MYC MEDICAL ADVICE (OUTPATIENT)
Dept: FAMILY MEDICINE | Facility: CLINIC | Age: 48
End: 2018-07-24

## 2018-07-24 DIAGNOSIS — Z30.09 FAMILY PLANNING: Primary | ICD-10-CM

## 2018-07-30 ASSESSMENT — ENCOUNTER SYMPTOMS
HEARTBURN: 1
ABDOMINAL PAIN: 1

## 2018-08-08 ENCOUNTER — OFFICE VISIT (OUTPATIENT)
Dept: UROLOGY | Facility: CLINIC | Age: 48
End: 2018-08-08
Attending: FAMILY MEDICINE
Payer: COMMERCIAL

## 2018-08-08 VITALS — OXYGEN SATURATION: 99 % | DIASTOLIC BLOOD PRESSURE: 67 MMHG | SYSTOLIC BLOOD PRESSURE: 110 MMHG | HEART RATE: 62 BPM

## 2018-08-08 DIAGNOSIS — Z30.09 ENCOUNTER FOR VASECTOMY COUNSELING: Primary | ICD-10-CM

## 2018-08-08 PROCEDURE — 99203 OFFICE O/P NEW LOW 30 MIN: CPT | Performed by: UROLOGY

## 2018-08-08 ASSESSMENT — PAIN SCALES - GENERAL: PAINLEVEL: NO PAIN (0)

## 2018-08-08 NOTE — MR AVS SNAPSHOT
After Visit Summary   8/8/2018    Franklin Muir    MRN: [id number]           Patient Information     Date Of Birth          1970        Visit Information        Provider Department      8/8/2018 10:00 AM Lewis Blunt MD [facility]        Today's Diagnoses     Encounter for vasectomy counseling    -  1       Follow-ups after your visit        Follow-up notes from your care team     Return if symptoms worsen or fail to improve.      Your next 10 appointments already scheduled     Aug 13, 2018  9:30 AM CDT   (Arrive by 9:15 AM)   RETURN DIABETES with Liz Conroy PA-C   [facility] Endocrinology ([facility])    [address] [place] [address] [phone]            Nov 13, 2018  9:30 AM CST   (Arrive by 9:15 AM)   RETURN DIABETES with CECILY Wilson MD   [facility] Endocrinology ([facility])    [address] [place] [address] [phone]              Who to contact     If you have questions or need follow up information about today's clinic visit or your schedule please contact [facility] directly at [phone].  Normal or non-critical lab and imaging results will be communicated to you by MyChart, letter or phone within 4 business days after the clinic has received the results. If you do not hear from us within 7 days, please contact the clinic through Attenderhart or phone. If you have a critical or abnormal lab result, we will notify you by phone as soon as possible.  Submit refill requests through Cod[employer] or call your pharmacy and they will forward the refill request to us. Please allow 3 business days for your refill to be completed.          Additional Information About Your Visit        [employer] Information     [employer] gives you secure access to your electronic health record. If you see a primary care provider, you can also send messages  to your care team and make appointments. If you have questions, please call your primary care clinic.  If you do not have a primary care provider, please call [phone] and they will assist you.      Altacor is an electronic gateway that provides easy, online access to your medical records. With Alt[employer], you can request a clinic appointment, read your test results, renew a prescription or communicate with your care team.     To access your existing account, please contact your [facility] or call [phone] for assistance.        Care EveryWhere ID     This is your Care EveryWhere ID. This could be used by other organizations to access your [place] medical records  [id number]        Your Vitals Were     Pulse Pulse Oximetry                62 99%           Blood Pressure from Last 3 Encounters:   08/08/18 110/67   05/07/18 125/76   04/24/18 115/72    Weight from Last 3 Encounters:   05/07/18 80.4 kg (177 lb 3.2 oz)   04/24/18 81.3 kg (179 lb 3.2 oz)   02/05/18 80 kg (176 lb 4.8 oz)              Today, you had the following     No orders found for display       Primary Care Provider Office Phone # Fax #    Ruddy Malave [phone]       [address] [facility] [address]        Equal Access to Services     GIULIANO MAHMOOD AH: Hadii aad ku hadasho Soomaali, waaxda luqadaha, qaybta kaalmada adeegyada, waxay idiin hayaan belinda sweet. So [facility] [phone].    ATENCIÓN: Si habla español, tiene a pedro disposición servicios gratuitos de asistencia lingüística. Llame al [phone].    We comply with applicable federal civil rights laws and [place] laws. We do not discriminate on the basis of race, color, national origin, age, disability, sex, sexual orientation, or gender identity.            Thank you!     Thank you for choosing [facility]  for your care. Our goal is always to provide you with excellent care. Hearing back from  our patients is one way we can continue to improve our services. Please take a few minutes to complete the written survey that you may receive in the mail after your visit with us. Thank you!             Your Updated Medication List - Protect others around you: Learn how to safely use, store and throw away your medicines at www.disposemymeds.org.          This list is accurate as of 8/8/18 10:34 AM.  Always use your most recent med list.                   Brand Name Dispense Instructions for use Diagnosis    ACE/ARB/ARNI NOT PRESCRIBED (INTENTIONAL)      by Other route continuous prn.        aspirin 81 MG tablet     100    ONE DAILY        atorvastatin 10 MG tablet    LIPITOR    90 tablet    Take 1 tablet (10 mg) by mouth daily    Mixed hyperlipidemia       blood glucose monitoring meter device kit      Use daily        blood glucose test strip    ESTEVAN BREEZE 2    450 each    Test 5 times daily    Diabetes mellitus type 1 (H)       CLINPRO 5000 1.1 % Pste   Generic drug:  Sodium Fluoride      Apply  to affected area. Use once every night        glucagon 1 MG kit    GLUCAGON EMERGENCY    2 each    Use as directed    Type 1 diabetes mellitus without complication (H)       Injection Device for insulin Carmen    NOVOPEN JR (GREEN)    1 each    Use as directed with insulin    Diabetes mellitus, type 1       insulin glargine 100 UNIT/ML injection    LANTUS SOLOSTAR    15 mL    Inject Subcutaneous. Inject 9 units daily    Diabetes mellitus type 1 (H)       insulin pen needle 31G X 5 MM    B-D U/F    500 each    Use 5 daily or as directed.    Type 1 diabetes mellitus without complication (H)       loratadine 10 MG tablet    CLARITIN          NovoLOG PENFILL 100 UNIT/ML injection   Generic drug:  insulin aspart     30 mL    1 unit:20 grams of CHO with meals and snacks, and for pre-meal correction, approx 30units daily    Diabetes mellitus type 1 (H)       VITAMIN D (CHOLECALCIFEROL) PO      Take 1,000 Units by mouth daily

## 2018-08-08 NOTE — NURSING NOTE
Franklin Muir's goals for this visit include: Vasectomy consult  He requests these members of his care team be copied on today's visit information: Yes    PCP: Ruddy Malave    Referring Provider:  Ruddy Malave MD  [address] [place] [address]    /67 (BP Location: Left arm)  Pulse 62  SpO2 99%    Do you need any medication refills at today's visit? No    Renzo DALEY

## 2018-08-13 ENCOUNTER — OFFICE VISIT (OUTPATIENT)
Dept: ENDOCRINOLOGY | Facility: CLINIC | Age: 48
End: 2018-08-13
Payer: COMMERCIAL

## 2018-08-13 VITALS
WEIGHT: 176.3 LBS | DIASTOLIC BLOOD PRESSURE: 73 MMHG | HEART RATE: 66 BPM | HEIGHT: 70 IN | BODY MASS INDEX: 25.24 KG/M2 | SYSTOLIC BLOOD PRESSURE: 111 MMHG

## 2018-08-13 DIAGNOSIS — E10.9 WELL CONTROLLED TYPE 1 DIABETES MELLITUS (H): Primary | ICD-10-CM

## 2018-08-13 LAB — HBA1C MFR BLD: 7.7 % (ref 4.3–6)

## 2018-08-13 NOTE — LETTER
2018       RE: Franklin Muir  [address]  [place] [address]     Dear Colleague,    Thank you for referring your patient, Franklin Muir, to the [facility] ENDOCRINOLOGY at [facility]. Please see a copy of my visit note below.    HPI:   Franklin is a 46 yo man here for follow up of type 1 diabetes.  He also sees Dr. Wilson. He ordered a dexcom sensor and is anxious to start it up.  He is not sure why there is a delay.  He reports that things have been going well, and he is no longer having much hypoglycemia.  He is currently on Lantus to 9 units daily at 7 AM and Novolog 1 unit/20 grams of CHO with meals and snacks, and for pre-meal correction @ 1/2 U per 30 mg/dL above 150 during the day and over 200 at HS.  He tried tightening up his IC ratio to 1/18g at dinner as we discussed last time, but he found he was going too low.  He finds that he is running too low post-breakfast and tends to climb too much in the evening.     Download from his meter shows patient is checking his blood glucose 4 times per day, average is 156 mg/dL.  He does have strong symptoms alerting him to hypoglycemia.  His brother also had type 1 diabetes and  of DKA.    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  He does cardio and weight lifting, basketball, mostly in the mid-day.  He anticipates this will slow down once he starts his fall semester.       Franklin also has a history of Hashimoto's ab positivity with normal function, has never been on levothyroxine replacement therapy. He generally has been feeling well and has no concerns today.     ROS   GENERAL: no weight loss, weight gain, fevers, chills, malaise, night sweats.   HEENT: no dysphagia, diplopia, neck pain or tenderness, dry/scratchy eyes, URI, cough, sinus drainage, tinnitus, sinus pressure  CV: no chest  pain, pressure, palpitations, skipped beats, LOC  LUNGS: no SOB, FOUNTAIN, cough, sputum production, wheezing   GI: no diarrhea, constipation, abdominal pain  EXTREMITIES: no rashes, ulcers, edema  NEUROLOGY: no changes in vision, tingling or numbness in hands or feet.   MSK: no muscle aches or pains, weakness  PSYCH: mood stable    Past Medical History:   Diagnosis Date     Chronic lymphocytic thyroiditis 7/20/2011     Chronic lymphocytic thyroiditis 7/20/2011     Chronic lymphocytic thyroiditis      Depressive disorder 06/01/1997    Treated for a couple of years; not currently experiencing     Diabetes 6/23/2009       Past Surgical History:   Procedure Laterality Date     HC TOOTH EXTRACTION W/FORCEP         Family History   Problem Relation Age of Onset     Hyperlipidemia Mother      Diabetes Brother      Hypertension Paternal Grandfather      Cerebrovascular Disease Paternal Grandfather      Hypertension Maternal Grandfather      Hyperlipidemia Maternal Grandfather      Cerebrovascular Disease Maternal Grandfather      Breast Cancer Maternal Aunt      Diabetes Brother      Anxiety Disorder Brother      Coronary Artery Disease Other      Maternal great grandfather     Hypertension Maternal Grandmother      Hyperlipidemia Maternal Grandmother      Hyperlipidemia Brother      Cerebrovascular Disease Paternal Grandmother      Asthma No family hx of      C.A.D. No family hx of      Cancer - colorectal No family hx of      Prostate Cancer No family hx of        Social History     Social History     Marital status:      Spouse name: N/A     Number of children: N/A     Years of education: N/A     Social History Main Topics     Smoking status: Never Smoker     Smokeless tobacco: Never Used     Alcohol use Yes      Comment: ~ 1 drink/day     Drug use: No     Sexual activity: Yes     Partners: Female     Birth control/ protection: Condom, Natural Family Planning      Comment: Would like to talk about vasectomy     Other  "Topics Concern     Parent/Sibling W/ Cabg, Mi Or Angioplasty Before 65f 55m? No     Social History Narrative   Social Hx:   .  Has a young son.  Works a  at the [employer].  Currently on sabbatical. Physically active.    Current Outpatient Prescriptions   Medication     ASPIRIN 81 MG OR TABS     atorvastatin (LIPITOR) 10 MG tablet     Injection Device for insulin (NOVOPEN JR, JAIDEN,) DUSTIN     insulin glargine (LANTUS SOLOSTAR) 100 UNIT/ML pen     loratadine (CLARITIN) 10 MG tablet     NOVOLOG PENFILL 100 UNIT/ML soln     Sodium Fluoride (CLINPRO 5000) 1.1 % PSTE     ACE/ARB NOT PRESCRIBED, INTENTIONAL,     blood glucose (ESTEVAN BREEZE 2) test strip     Blood Glucose Monitoring Suppl (ASCENSIA BREEZE 2 SYSTEM) W/DEVICE KIT     glucagon (GLUCAGON EMERGENCY) 1 MG injection     insulin pen needle (B-D U/F) 31G X 5 MM     VITAMIN D, CHOLECALCIFEROL, PO     No current facility-administered medications for this visit.           Allergies   Allergen Reactions     Ceftin Rash     Ampicillin Rash       Physical Exam  /73 (BP Location: Right arm, Patient Position: Sitting, Cuff Size: Adult Regular)  Pulse 66  Ht 1.778 m (5' 10\")  Wt 80 kg (176 lb 4.8 oz)  BMI 25.3 kg/m2  GENERAL:  Alert and oriented X3, NAD, well dressed, answering questions appropriately, appears stated age.  EXTREMITIES: no edema, +pulses, no rashes, no lesions      RESULTS  Lab Results   Component Value Date    A1C 8.4 (H) 03/13/2017    A1C 7.6 (H) 02/18/2016    A1C 7.8 (H) 07/07/2015    A1C 6.9 (H) 12/03/2013    A1C 6.9 (A) 09/25/2013    HEMOGLOBINA1 7.7 (A) 08/13/2018    HEMOGLOBINA1 7.6 (A) 04/24/2018    HEMOGLOBINA1 7.3 (A) 02/05/2018    HEMOGLOBINA1 7.3 (A) 10/31/2017    HEMOGLOBINA1 6.9 (A) 07/10/2017       TSH   Date Value Ref Range Status   04/18/2018 2.20 0.40 - 4.00 mU/L Final   03/13/2017 2.81 0.40 - 4.00 mU/L Final   02/18/2016 3.26 0.40 - 4.00 mU/L Final   07/07/2015 1.70 0.40 - 4.00 mU/L Final "   01/05/2015 1.71 0.40 - 4.00 mU/L Final     Comment:     Effective 7/30/2014, the reference range for this assay has changed to reflect   new instrumentation/methodology.       T4 Total   Date Value Ref Range Status   09/28/2010 9.0 5.0 - 11.0 ug/dL Final     T4 Free   Date Value Ref Range Status   02/18/2016 1.06 0.76 - 1.46 ng/dL Final   07/07/2015 1.04 0.76 - 1.46 ng/dL Final   01/05/2015 1.02 0.76 - 1.46 ng/dL Final     Comment:     Effective 7/30/2014, the reference range for this assay has changed to reflect   new instrumentation/methodology.     12/03/2013 1.08 0.70 - 1.85 ng/dL Final   01/26/2011 1.10 0.70 - 1.85 ng/dL Final       ALT   Date Value Ref Range Status   03/13/2017 24 0 - 70 U/L Final   02/18/2016 24 0 - 70 U/L Final   ]    Recent Labs   Lab Test  05/04/18   1016  03/13/17   0851   01/05/15   0933  12/03/13   0907   CHOL  121  106   < >  134  163   HDL  50  51   < >  47  43   LDL  66  46   < >  76  108   TRIG  23  46   < >  57  57   CHOLHDLRATIO   --    --    --   2.9  3.8    < > = values in this interval not displayed.       Lab Results   Component Value Date     03/13/2017      Lab Results   Component Value Date    POTASSIUM 4.3 03/13/2017     Lab Results   Component Value Date    CHLORIDE 102 03/13/2017     Lab Results   Component Value Date    SHANIKA 9.1 03/13/2017     Lab Results   Component Value Date    CO2 31 03/13/2017     Lab Results   Component Value Date    BUN 15 03/13/2017     Lab Results   Component Value Date    CR 0.86 04/18/2018       GFR Estimate   Date Value Ref Range Status   04/18/2018 >90 >60 mL/min/1.7m2 Final     Comment:     Non  GFR Calc   03/13/2017 >90  Non  GFR Calc   >60 mL/min/1.7m2 Final   02/18/2016 >90  Non  GFR Calc   >60 mL/min/1.7m2 Final     GFR Estimate If Black   Date Value Ref Range Status   04/18/2018 >90 >60 mL/min/1.7m2 Final     Comment:      GFR Calc   03/13/2017 >90    "American GFR Calc   >60 mL/min/1.7m2 Final   02/18/2016 >90   GFR Calc   >60 mL/min/1.7m2 Final       Lab Results   Component Value Date    MICROL 5 04/18/2018     No results found for: MICROALBUMIN  Lab Results   Component Value Date    CPEPT 2.3 11/19/2009    GADAB >250.0 (H) 11/19/2009    ISCAB  09/08/2009     1:16  Reference range: <1:4  (Note)  TEST INFORMATION: Islet Cell Ab, IgG  Islet cell antibodies have been associated with  \"autoimmune\" endocrine disorders and insulin-dependent  diabetes. This disorder is characterized by the presence  of antibodies in patients that may be detected years  before the onset of the clinical symptoms. To calculate  Juvenile Diabetes Foundation (JDF) units: multiply the  titer x 5 (1:8  8 x 5 = 40 JDF Units).  Performed by [facility],  [address] [place] [id number] [phone]  [email or web address], Evelia Miles MD, Lab. Director       No results found for: B12]    Most recent eye exam date: : 11/18/2015   Most recent eye exam date: : 11/18/2015     Eye exam in December, 2017- no retinopathy      Assessment/Plan:     1.  Type 1 diabetes- Overall, doing well, but dropping too much post breakfast and climbing too much in the evening.   Will make the following changes (instructions given to patient):   Consider insulin dosing vikash:   [email or web address]  Insulin dosing vikash for type 1 diabetes    Switch lantus to dinner time.  Keep at 9 units x 2 weeks.  If AM readings remain over 120, increase to 10 units.     I will contact Angie Zhao about Dexcom. He is looking forward to starting this.     2.  Risk factors-     Retinopathy:  No.  Had eye exam within last year.   Nephropathy:  BP well controlled. No microalbuminuria.  Creatinine stable.   Neuropathy: No.    Feet: OK, no ulcers.   Lipids:  LDL at target.  Patient taking statin    3.  F/U in 3 months with me, in 6 months with Dr. Wilson.     I spent 30 minutes with this patient face to face " and explained the conditions and plans (more than 50% of time was counseling/coordination of care, diabetes management, follow up plan for worsening hyper and hypoglycemia) . The patient understood and is satisfied with today's visit.        Again, thank you for allowing me to participate in the care of your patient.      Sincerely,    Liz Conroy PA-C

## 2018-08-13 NOTE — NURSING NOTE
Chief Complaint   Patient presents with     RECHECK     Type I Diabetes      Performed capillary puncture for A1C testing. Patient tolerated well.    Elba Spicer, [facility]  Endocrinology & Diabetes

## 2018-08-13 NOTE — PATIENT INSTRUCTIONS
[email or web address]  Insulin dosing vikash for type 1 diabetes    Switch lantus to dinner time.  Keep at 9 units x 2 weeks.  If AM readings remain over 120, increase to 10 units.

## 2018-08-13 NOTE — PROGRESS NOTES
HPI:   Franklin is a 46 yo man here for follow up of type 1 diabetes.  He also sees Dr. Wilson. He ordered a dexcom sensor and is anxious to start it up.  He is not sure why there is a delay.  He reports that things have been going well, and he is no longer having much hypoglycemia.  He is currently on Lantus to 9 units daily at 7 AM and Novolog 1 unit/20 grams of CHO with meals and snacks, and for pre-meal correction @ 1/2 U per 30 mg/dL above 150 during the day and over 200 at HS.  He tried tightening up his IC ratio to 1/18g at dinner as we discussed last time, but he found he was going too low.  He finds that he is running too low post-breakfast and tends to climb too much in the evening.     Download from his meter shows patient is checking his blood glucose 4 times per day, average is 156 mg/dL.  He does have strong symptoms alerting him to hypoglycemia.  His brother also had type 1 diabetes and  of DKA.    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  He does cardio and weight lifting, basketball, mostly in the mid-day.  He anticipates this will slow down once he starts his  semester.       Franklin also has a history of Hashimoto's ab positivity with normal function, has never been on levothyroxine replacement therapy. He generally has been feeling well and has no concerns today.     ROS   GENERAL: no weight loss, weight gain, fevers, chills, malaise, night sweats.   HEENT: no dysphagia, diplopia, neck pain or tenderness, dry/scratchy eyes, URI, cough, sinus drainage, tinnitus, sinus pressure  CV: no chest pain, pressure, palpitations, skipped beats, LOC  LUNGS: no SOB, FOUNTAIN, cough, sputum production, wheezing   GI: no diarrhea, constipation, abdominal pain  EXTREMITIES: no rashes, ulcers, edema  NEUROLOGY: no changes in vision, tingling or numbness in hands or feet.   MSK: no muscle  aches or pains, weakness  PSYCH: mood stable    Past Medical History:   Diagnosis Date     Chronic lymphocytic thyroiditis 7/20/2011     Chronic lymphocytic thyroiditis 7/20/2011     Chronic lymphocytic thyroiditis      Depressive disorder 06/01/1997    Treated for a couple of years; not currently experiencing     Diabetes 6/23/2009       Past Surgical History:   Procedure Laterality Date     HC TOOTH EXTRACTION W/FORCEP         Family History   Problem Relation Age of Onset     Hyperlipidemia Mother      Diabetes Brother      Hypertension Paternal Grandfather      Cerebrovascular Disease Paternal Grandfather      Hypertension Maternal Grandfather      Hyperlipidemia Maternal Grandfather      Cerebrovascular Disease Maternal Grandfather      Breast Cancer Maternal Aunt      Diabetes Brother      Anxiety Disorder Brother      Coronary Artery Disease Other      Maternal great grandfather     Hypertension Maternal Grandmother      Hyperlipidemia Maternal Grandmother      Hyperlipidemia Brother      Cerebrovascular Disease Paternal Grandmother      Asthma No family hx of      C.A.D. No family hx of      Cancer - colorectal No family hx of      Prostate Cancer No family hx of        Social History     Social History     Marital status:      Spouse name: N/A     Number of children: N/A     Years of education: N/A     Social History Main Topics     Smoking status: Never Smoker     Smokeless tobacco: Never Used     Alcohol use Yes      Comment: ~ 1 drink/day     Drug use: No     Sexual activity: Yes     Partners: Female     Birth control/ protection: Condom, Natural Family Planning      Comment: Would like to talk about vasectomy     Other Topics Concern     Parent/Sibling W/ Cabg, Mi Or Angioplasty Before 65f 55m? No     Social History Narrative   Social Hx:   .  Has a young son.  Works a  at the [employer].  Currently on [employer]. Physically active.    Current Outpatient  "Prescriptions   Medication     ASPIRIN 81 MG OR TABS     atorvastatin (LIPITOR) 10 MG tablet     Injection Device for insulin (NOVOPEN JRJAIDEN,) DUSTIN     insulin glargine (LANTUS SOLOSTAR) 100 UNIT/ML pen     loratadine (CLARITIN) 10 MG tablet     NOVOLOG PENFILL 100 UNIT/ML soln     Sodium Fluoride (CLINPRO 5000) 1.1 % PSTE     ACE/ARB NOT PRESCRIBED, INTENTIONAL,     blood glucose (ESTEVAN BREEZE 2) test strip     Blood Glucose Monitoring Suppl (EraGen BiosciencesEZE 2 SYSTEM) W/DEVICE KIT     glucagon (GLUCAGON EMERGENCY) 1 MG injection     insulin pen needle (B-D U/F) 31G X 5 MM     VITAMIN D, CHOLECALCIFEROL, PO     No current facility-administered medications for this visit.           Allergies   Allergen Reactions     Ceftin Rash     Ampicillin Rash       Physical Exam  /73 (BP Location: Right arm, Patient Position: Sitting, Cuff Size: Adult Regular)  Pulse 66  Ht 1.778 m (5' 10\")  Wt 80 kg (176 lb 4.8 oz)  BMI 25.3 kg/m2  GENERAL:  Alert and oriented X3, NAD, well dressed, answering questions appropriately, appears stated age.  EXTREMITIES: no edema, +pulses, no rashes, no lesions      RESULTS  Lab Results   Component Value Date    A1C 8.4 (H) 03/13/2017    A1C 7.6 (H) 02/18/2016    A1C 7.8 (H) 07/07/2015    A1C 6.9 (H) 12/03/2013    A1C 6.9 (A) 09/25/2013    HEMOGLOBINA1 7.7 (A) 08/13/2018    HEMOGLOBINA1 7.6 (A) 04/24/2018    HEMOGLOBINA1 7.3 (A) 02/05/2018    HEMOGLOBINA1 7.3 (A) 10/31/2017    HEMOGLOBINA1 6.9 (A) 07/10/2017       TSH   Date Value Ref Range Status   04/18/2018 2.20 0.40 - 4.00 mU/L Final   03/13/2017 2.81 0.40 - 4.00 mU/L Final   02/18/2016 3.26 0.40 - 4.00 mU/L Final   07/07/2015 1.70 0.40 - 4.00 mU/L Final   01/05/2015 1.71 0.40 - 4.00 mU/L Final     Comment:     Effective 7/30/2014, the reference range for this assay has changed to reflect   new instrumentation/methodology.       T4 Total   Date Value Ref Range Status   09/28/2010 9.0 5.0 - 11.0 ug/dL Final     T4 Free   Date Value " Ref Range Status   02/18/2016 1.06 0.76 - 1.46 ng/dL Final   07/07/2015 1.04 0.76 - 1.46 ng/dL Final   01/05/2015 1.02 0.76 - 1.46 ng/dL Final     Comment:     Effective 7/30/2014, the reference range for this assay has changed to reflect   new instrumentation/methodology.     12/03/2013 1.08 0.70 - 1.85 ng/dL Final   01/26/2011 1.10 0.70 - 1.85 ng/dL Final       ALT   Date Value Ref Range Status   03/13/2017 24 0 - 70 U/L Final   02/18/2016 24 0 - 70 U/L Final   ]    Recent Labs   Lab Test  05/04/18   1016  03/13/17   0851   01/05/15   0933  12/03/13   0907   CHOL  121  106   < >  134  163   HDL  50  51   < >  47  43   LDL  66  46   < >  76  108   TRIG  23  46   < >  57  57   CHOLHDLRATIO   --    --    --   2.9  3.8    < > = values in this interval not displayed.       Lab Results   Component Value Date     03/13/2017      Lab Results   Component Value Date    POTASSIUM 4.3 03/13/2017     Lab Results   Component Value Date    CHLORIDE 102 03/13/2017     Lab Results   Component Value Date    SHANIKA 9.1 03/13/2017     Lab Results   Component Value Date    CO2 31 03/13/2017     Lab Results   Component Value Date    BUN 15 03/13/2017     Lab Results   Component Value Date    CR 0.86 04/18/2018       GFR Estimate   Date Value Ref Range Status   04/18/2018 >90 >60 mL/min/1.7m2 Final     Comment:     Non  GFR Calc   03/13/2017 >90  Non  GFR Calc   >60 mL/min/1.7m2 Final   02/18/2016 >90  Non  GFR Calc   >60 mL/min/1.7m2 Final     GFR Estimate If Black   Date Value Ref Range Status   04/18/2018 >90 >60 mL/min/1.7m2 Final     Comment:      GFR Calc   03/13/2017 >90   GFR Calc   >60 mL/min/1.7m2 Final   02/18/2016 >90   GFR Calc   >60 mL/min/1.7m2 Final       Lab Results   Component Value Date    MICROL 5 04/18/2018     No results found for: MICROALBUMIN  Lab Results   Component Value Date    CPEPT 2.3 11/19/2009    GADAB  ">250.0 (H) 11/19/2009    ISCAB  09/08/2009     1:16  Reference range: <1:4  (Note)  TEST INFORMATION: Islet Cell Ab, IgG  Islet cell antibodies have been associated with  \"autoimmune\" endocrine disorders and insulin-dependent  diabetes. This disorder is characterized by the presence  of antibodies in patients that may be detected years  before the onset of the clinical symptoms. To calculate  Juvenile Diabetes Foundation (JDF) units: multiply the  titer x 5 (1:8  8 x 5 = 40 JDF Units).  Performed by [facility] [employer],  [address] [place] [id number] [phone]  [email or web address], Evelia Miles MD, Lab. Director       No results found for: B12]    Most recent eye exam date: : 11/18/2015   Most recent eye exam date: : 11/18/2015     Eye exam in December, 2017- no retinopathy      Assessment/Plan:     1.  Type 1 diabetes- Overall, doing well, but dropping too much post breakfast and climbing too much in the evening.   Will make the following changes (instructions given to patient):   Consider insulin dosing vikash:   [email or web address]/  Insulin dosing vikash for type 1 diabetes    Switch lantus to dinner time.  Keep at 9 units x 2 weeks.  If AM readings remain over 120, increase to 10 units.     I will contact Angie Zhao about Dexcom. He is looking forward to starting this.     2.  Risk factors-     Retinopathy:  No.  Had eye exam within last year.   Nephropathy:  BP well controlled. No microalbuminuria.  Creatinine stable.   Neuropathy: No.    Feet: OK, no ulcers.   Lipids:  LDL at target.  Patient taking statin    3.  F/U in 3 months with me, in 6 months with Dr. Wilson.     I spent 30 minutes with this patient face to face and explained the conditions and plans (more than 50% of time was counseling/coordination of care, diabetes management, follow up plan for worsening hyper and hypoglycemia) . The patient understood and is satisfied with today's visit.      Liz Conroy PA-C, MPAS   [facility]" [place]  Department of Medicine  Division of Endocrinology and Diabetes

## 2018-08-13 NOTE — MR AVS SNAPSHOT
After Visit Summary   8/13/2018    Franklin Muir    MRN: [id number]           Patient Information     Date Of Birth          1970        Visit Information        Provider Department      8/13/2018 9:30 AM Liz Conroy PA-C [facility] Endocrinology        Today's Diagnoses     Diabetes mellitus type 1 (H)    -  1      Care Instructions    [email or web address]  Insulin dosing vikash for type 1 diabetes    Switch lantus to dinner time.  Keep at 9 units x 2 weeks.  If AM readings remain over 120, increase to 10 units.                Follow-ups after your visit        Your next 10 appointments already scheduled     Oct 25, 2018  2:20 PM CDT   (Arrive by 2:05 PM)   Vasectomy with Lewis Blunt MD   [facility] and [facility] ([facility])    [address] [place] [address] [phone]            Nov 13, 2018  9:30 AM CST   (Arrive by 9:15 AM)   RETURN DIABETES with CECILY Wilson MD   [facility] End[facility]ology ([facility])    [address] [place] [address] [phone]              Who to contact     Please call your clinic at [phone] to:    Ask questions about your health    Make or cancel appointments    Discuss your medicines    Learn about your test results    Speak to your doctor            Additional Information About Your Visit        [employer] Information     [employer] gives you secure access to your electronic health record. If you see a primary care provider, you can also send messages to your care team and make appointments. If you have questions, please call your primary care clinic.  If you do not have a primary care provider, please call [phone] and they will assist you.      [employer] is an electronic gateway that provides easy, online access to your medical records. With [employer], you can request a clinic appointment, read  "your test results, renew a prescription or communicate with your care team.     To access your existing account, please contact your [facility] or call [phone] for assistance.        Care EveryWhere ID     This is your Care EveryWhere ID. This could be used by other organizations to access your [place] medical records  [id number]        Your Vitals Were     Pulse Height BMI (Body Mass Index)             66 1.778 m (5' 10\") 25.3 kg/m2          Blood Pressure from Last 3 Encounters:   08/13/18 111/73   08/08/18 110/67   05/07/18 125/76    Weight from Last 3 Encounters:   08/13/18 80 kg (176 lb 4.8 oz)   05/07/18 80.4 kg (177 lb 3.2 oz)   04/24/18 81.3 kg (179 lb 3.2 oz)              We Performed the Following     Hemoglobin A1c POCT        Primary Care Provider Office Phone # Fax #    Ruddy Robbin Malave [phone]       [facility] [address]        Equal Access to Services     [facility]: Hadii aad ku hadasho Soomaali, waaxda luqadaha, qaybta kaalmada adeegyarichelle, celestine rogers . So [facility] [phone].    ATENCIÓN: Si habla español, tiene a pedro disposición servicios gratuitos de asistencia lingüística. Llame al [phone].    We comply with applicable federal civil rights laws and [place] laws. We do not discriminate on the basis of race, color, national origin, age, disability, sex, sexual orientation, or gender identity.            Thank you!     Thank you for choosing [facility] ENDO[facility]  for your care. Our goal is always to provide you with excellent care. Hearing back from our patients is one way we can continue to improve our services. Please take a few minutes to complete the written survey that you may receive in the mail after your visit with us. Thank you!             Your Updated Medication List - Protect others around you: Learn how to safely use, store and throw away your medicines at " www.disposemymeds.org.          This list is accurate as of 8/13/18 10:27 AM.  Always use your most recent med list.                   Brand Name Dispense Instructions for use Diagnosis    ACE/ARB/ARNI NOT PRESCRIBED (INTENTIONAL)      by Other route continuous prn.        aspirin 81 MG tablet     100    ONE DAILY        atorvastatin 10 MG tablet    LIPITOR    90 tablet    Take 1 tablet (10 mg) by mouth daily    Mixed hyperlipidemia       blood glucose monitoring meter device kit      Use daily        blood glucose test strip    ESTEVAN BREEZE 2    450 each    Test 5 times daily    Diabetes mellitus type 1 (H)       CLINPRO 5000 1.1 % Pste   Generic drug:  Sodium Fluoride      Apply  to affected area. Use once every night        glucagon 1 MG kit    GLUCAGON EMERGENCY    2 each    Use as directed    Type 1 diabetes mellitus without complication (H)       Injection Device for insulin Carmen    NOVOPEN JR (GREEN)    1 each    Use as directed with insulin    Diabetes mellitus, type 1       insulin glargine 100 UNIT/ML injection    LANTUS SOLOSTAR    15 mL    Inject Subcutaneous. Inject 9 units daily    Diabetes mellitus type 1 (H)       insulin pen needle 31G X 5 MM    B-D U/F    500 each    Use 5 daily or as directed.    Type 1 diabetes mellitus without complication (H)       loratadine 10 MG tablet    CLARITIN          NovoLOG PENFILL 100 UNIT/ML injection   Generic drug:  insulin aspart     30 mL    1 unit:20 grams of CHO with meals and snacks, and for pre-meal correction, approx 30units daily    Diabetes mellitus type 1 (H)       VITAMIN D (CHOLECALCIFEROL) PO      Take 1,000 Units by mouth daily

## 2018-08-14 ENCOUNTER — MEDICAL CORRESPONDENCE (OUTPATIENT)
Dept: HEALTH INFORMATION MANAGEMENT | Facility: CLINIC | Age: 48
End: 2018-08-14

## 2018-08-17 NOTE — PROGRESS NOTES
CC: Desires sterilization, consult for vasectomy from Dr. Ruddy Malave     HPI: Franklin Muir is a 48 year old male with 1 child, and he is intersted in getting a vasectomy for sterilization.  His wife takes medications and pregnancy would be contraindicated.     No voiding problems.  No history of  trauma.  No hematuria  Normal sexual function.  No history of bleeding problems.    PMH:   Past Medical History:   Diagnosis Date     Chronic lymphocytic thyroiditis 7/20/2011     Chronic lymphocytic thyroiditis 7/20/2011     Chronic lymphocytic thyroiditis      Depressive disorder 06/01/1997    Treated for a couple of years; not currently experiencing     Diabetes 6/23/2009       FAMILY HX:   Family History   Problem Relation Age of Onset     Hyperlipidemia Mother      Diabetes Brother      Hypertension Paternal Grandfather      Cerebrovascular Disease Paternal Grandfather      Hypertension Maternal Grandfather      Hyperlipidemia Maternal Grandfather      Cerebrovascular Disease Maternal Grandfather      Breast Cancer Maternal Aunt      Diabetes Brother      Anxiety Disorder Brother      Coronary Artery Disease Other      Maternal great grandfather     Hypertension Maternal Grandmother      Hyperlipidemia Maternal Grandmother      Hyperlipidemia Brother      Cerebrovascular Disease Paternal Grandmother      Asthma No family hx of      C.A.D. No family hx of      Cancer - colorectal No family hx of      Prostate Cancer No family hx of       No history of coagulopathies, no  malignancies.     ALLERGIES:      Allergies   Allergen Reactions     Ceftin Rash     Ampicillin Rash       MEDS:   Current Outpatient Prescriptions   Medication Sig     ACE/ARB NOT PRESCRIBED, INTENTIONAL, by Other route continuous prn.     ASPIRIN 81 MG OR TABS ONE DAILY     atorvastatin (LIPITOR) 10 MG tablet Take 1 tablet (10 mg) by mouth daily     blood glucose (ESTEVAN BREEZE 2) test strip Test 5 times daily     Blood Glucose  Monitoring Suppl (TopioEZE 2 SYSTEM) W/DEVICE KIT Use daily     glucagon (GLUCAGON EMERGENCY) 1 MG injection Use as directed     Injection Device for insulin (NOVOPEN JR, JAIDEN,) DUSTIN Use as directed with insulin     insulin glargine (LANTUS SOLOSTAR) 100 UNIT/ML pen Inject Subcutaneous. Inject 9 units daily     insulin pen needle (B-D U/F) 31G X 5 MM Use 5 daily or as directed.     loratadine (CLARITIN) 10 MG tablet      NOVOLOG PENFILL 100 UNIT/ML soln 1 unit:20 grams of CHO with meals and snacks, and for pre-meal correction, approx 30units daily     Sodium Fluoride (CLINPRO 5000) 1.1 % PSTE Apply  to affected area. Use once every night     VITAMIN D, CHOLECALCIFEROL, PO Take 1,000 Units by mouth daily     No current facility-administered medications for this visit.        SOCIAL HISTORY:  Social History   Substance Use Topics     Smoking status: Never Smoker     Smokeless tobacco: Never Used     Alcohol use Yes      Comment: ~ 1 drink/day        REVIEW OF SYMPTOMS:   Denies testicular pain, ED, ejaculatory problems, rashes in the groin, easy bruising or bleeding.   No constitutional, eye, ENT, heart, lung, GI, musculoskeletal, skin, neurologic, psychiatric, endocrine or hematologic complaints.       GENERAL PHYSICAL EXAM:   /67 (BP Location: Left arm)  Pulse 62  SpO2 99%   Constitutional: No acute distress. Well nourished.   PSYCH: Normal mood and affect.   NEURO: Normal gait, no focal deficits.   EYES: Anicteric, EOMI, PERR  CARDIOPULMONARY: Breathing non-labored, pulse regular, no peripheral edema.   GI: Abdomen soft, non-tender, surgical scars: none,  no organomegaly.  MUSCULOSKELETAL: Normal limb proportions, no muscle wasting, no contractures.    SKIN: Normal virilized hair distribution, no lesions, warts or rashes over genitalia, abdomen extremities or face.   HEME/LYMPH: No echymosis, no lymphadenopathy in groin or neck, no lymphedema.     EXAM:  Phallus circumcised, meatus adequate, no  plaques palpated.   Testes descended, consistency is normal. No intra-testicular masses.  Epididymes present.  Vas present bilateraly, both easy to find.  ARABELLA deferred.      I discussed with him at length the risks and benefits of the procedure. He understands that this is a sterilization procedure, and not reversible contraception. He understands that reversals, while possible, are not guaranteed to work and fairly complex. I discussed with him the option of sperm cryopreservation.     I stressed that he continues to be fertile in the post-operative period, and that he should continue using other contraceptive methods, such as a condom, until he obtains a semen analysis and we review the results to confirm success of the procedure and infertility. I also stressed to him that recanalization and pregnancy can occur in about 1 per thousand cases, possibly more even after we clear him with a semen analysis showing no motile sperm. I counseled him on the rosy-operative risks of bleeding, infection, pain.  I described to him post-vasectomy pain syndrome that can occur in about 1 to 2% of men undergoing vasectomy.     We also discussed recovery times (typically days if no complications) and post-operative care including use of ice packs, pain medication and wound care.    He will think about the above and schedule the procedure if he decides to proceed.   no

## 2018-11-12 ENCOUNTER — PATIENT OUTREACH (OUTPATIENT)
Dept: CARE COORDINATION | Facility: CLINIC | Age: 48
End: 2018-11-12

## 2018-11-13 ENCOUNTER — OFFICE VISIT (OUTPATIENT)
Dept: ENDOCRINOLOGY | Facility: CLINIC | Age: 48
End: 2018-11-13
Payer: COMMERCIAL

## 2018-11-13 VITALS
DIASTOLIC BLOOD PRESSURE: 73 MMHG | BODY MASS INDEX: 25.24 KG/M2 | HEIGHT: 70 IN | WEIGHT: 176.3 LBS | SYSTOLIC BLOOD PRESSURE: 114 MMHG | HEART RATE: 69 BPM

## 2018-11-13 DIAGNOSIS — E10.65 TYPE 1 DIABETES MELLITUS WITH HYPERGLYCEMIA (H): Primary | ICD-10-CM

## 2018-11-13 LAB — HBA1C MFR BLD: 7.9 % (ref 4.3–6)

## 2018-11-13 NOTE — MR AVS SNAPSHOT
After Visit Summary   11/13/2018    Franklin Muir    MRN: [id number]           Patient Information     Date Of Birth          1970        Visit Information        Provider Department      11/13/2018 9:30 AM CECILY Wilson MD [facility]        Today's Diagnoses     Type 1 diabetes mellitus with hyperglycemia (H)    -  1       Follow-ups after your visit        Follow-up notes from your care team     Return in about 3 months (around 2/13/2019).      Your next 10 appointments already scheduled     Yoni 10, 2019  2:20 PM CST   (Arrive by 2:05 PM)   Vasectomy with Lewis Blunt MD   [facility] ([facility])    [address] [place] [address]  [address] [address] [phone]            Mar 12, 2019 10:00 AM CDT   (Arrive by 9:45 AM)   RETURN DIABETES with CECILY Wilson MD   [facility] Endocrinology ([facility])    [address] [place] [address] [phone]              Who to contact     Please call your clinic at [phone] to:    Ask questions about your health    Make or cancel appointments    Discuss your medicines    Learn about your test results    Speak to your doctor            Additional Information About Your Visit        [employer] Information     [employer] gives you secure access to your electronic health record. If you see a primary care provider, you can also send messages to your care team and make appointments. If you have questions, please call your primary care clinic.  If you do not have a primary care provider, please call [phone] and they will assist you.      [employer] is an electronic gateway that provides easy, online access to your medical records. With [employer], you can request a clinic appointment, read your test results, renew a prescription or communicate with your care team.     To access your  "existing account, please contact your [facility] or call [phone] for assistance.        Care EveryWhere ID     This is your Care EveryWhere ID. This could be used by other organizations to access your [place] medical records  [id number]        Your Vitals Were     Pulse Height BMI (Body Mass Index)             69 1.778 m (5' 10\") 25.3 kg/m2          Blood Pressure from Last 3 Encounters:   11/13/18 114/73   08/13/18 111/73   08/08/18 110/67    Weight from Last 3 Encounters:   11/13/18 80 kg (176 lb 4.8 oz)   08/13/18 80 kg (176 lb 4.8 oz)   05/07/18 80.4 kg (177 lb 3.2 oz)              We Performed the Following     Continuous Glucose Monitoring >=72 hours PHYS INTERP     Hemoglobin A1c POCT          Today's Medication Changes          These changes are accurate as of 11/13/18  1:36 PM.  If you have any questions, ask your nurse or doctor.               These medicines have changed or have updated prescriptions.        Dose/Directions    insulin glargine 100 UNIT/ML injection   Commonly known as:  LANTUS SOLOSTAR   This may have changed:    - how much to take  - additional instructions   Used for:  Diabetes mellitus type 1 (H)        Inject Subcutaneous. Inject 9 units daily   Quantity:  15 mL   Refills:  3         Stop taking these medicines if you haven't already. Please contact your care team if you have questions.     aspirin 81 MG tablet   Stopped by:  CECILY Wilson MD                    Primary Care Provider Office Phone # Fax #    Ruddy Robbin Malave [phone]       [facility] [address]        Equal Access to Services     [facility] : Hadii wendy gaming hadasho Sojuanjo, waaxda luqadaha, qaybta kaalmada celestine ramirez. So [facility] [phone].    ATENCIÓN: Si habla español, tiene a pedro disposición servicios gratuitos de asistencia lingüística. Llame al [phone].    We comply with " applicable federal civil rights laws and [place] laws. We do not discriminate on the basis of race, color, national origin, age, disability, sex, sexual orientation, or gender identity.            Thank you!     Thank you for choosing [facility] ENDOCRINOLOGY  for your care. Our goal is always to provide you with excellent care. Hearing back from our patients is one way we can continue to improve our services. Please take a few minutes to complete the written survey that you may receive in the mail after your visit with us. Thank you!             Your Updated Medication List - Protect others around you: Learn how to safely use, store and throw away your medicines at www.disposemymeds.org.          This list is accurate as of 11/13/18  1:36 PM.  Always use your most recent med list.                   Brand Name Dispense Instructions for use Diagnosis    ACE/ARB/ARNI NOT PRESCRIBED (INTENTIONAL)      by Other route continuous prn.        atorvastatin 10 MG tablet    LIPITOR    90 tablet    Take 1 tablet (10 mg) by mouth daily    Mixed hyperlipidemia       blood glucose monitoring meter device kit      Use daily        blood glucose test strip    ESTEVAN BREEZE 2    450 each    Test 5 times daily    Diabetes mellitus type 1 (H)       CLINPRO 5000 1.1 % Pste   Generic drug:  Sodium Fluoride      Apply  to affected area. Use once every night        glucagon 1 MG kit    GLUCAGON EMERGENCY    2 each    Use as directed    Type 1 diabetes mellitus without complication (H)       Injection Device for insulin Carmen    NOVOPEN JR (GREEN)    1 each    Use as directed with insulin    Diabetes mellitus, type 1       insulin glargine 100 UNIT/ML injection    LANTUS SOLOSTAR    15 mL    Inject Subcutaneous. Inject 9 units daily    Diabetes mellitus type 1 (H)       insulin pen needle 31G X 5 MM    B-D U/F    500 each    Use 5 daily or as directed.    Type 1 diabetes mellitus without complication (H)       loratadine 10 MG tablet     CLARITIN          NovoLOG PENFILL 100 UNIT/ML injection   Generic drug:  insulin aspart     30 mL    1 unit:20 grams of CHO with meals and snacks, and for pre-meal correction, approx 30units daily    Diabetes mellitus type 1 (H)       VITAMIN D (CHOLECALCIFEROL) PO      Take 1,000 Units by mouth daily

## 2018-11-13 NOTE — NURSING NOTE
Chief Complaint   Patient presents with     RECHECK     Type I Diabetes     Performed capillary puncture for A1C testing. Patient tolerated well.    Elba Spicer, [facility]  Endocrinology & Diabetes

## 2018-11-13 NOTE — PROGRESS NOTES
Endocrinology Clinic  11/13/2018  Franklin Muir     HPI:  Franklin is a 47 yo man here for follow up of type 1 diabetes Dx in 2009 ( and anti DEE > 250).  He recently established care  after Dr. Swenson left, and he is now also seeing Liz Conroy in our clinic.      Current regimen  - Lantus to 10 units daily at dinner  - Novolog 1 unit/ 20 grams of CHO with meals and snacks,   - pre-meal correction @ 1/2 U per 30 mg/dL above 150 mg/dL    Glycemic data  Last seen 8/2018 with reduction of lantus from 11 to 10 unit    No medical updates. The normal things are still affecting his blood sugar. Coffee seems to bring the BS up. He snacks quite a bit in the night time. The felt the meal coverage of insulin is pretty good. The exercise is getting more sporadic with academic year. He started wearing the CGM (Dexcom g6) since September. Occasional low with symptoms. We reviewed the CGM data with the patient as detail in A&P     Diabetes complications  Retinopathy: Dec 21 2018 to see optho  Nephropathy: negative 2018  Neuropathy: No neuropathy  Macrovascular: on atorvastatin 10 mg with LDL 66    He also has a history of Hashimoto's ab positivity with normal function, has never been on levothyroxine replacement therapy. He generally has been feeling well and has no concerns today.     ROS   11 point ROS is as detailed in the HPI above, as per patient below, or negative    Answers for HPI/ROS submitted by the patient on 11/4/2018   General Symptoms: No  Skin Symptoms: No  HENT Symptoms: No  EYE SYMPTOMS: No  HEART SYMPTOMS: No  LUNG SYMPTOMS: No  INTESTINAL SYMPTOMS: No  URINARY SYMPTOMS: No  REPRODUCTIVE SYMPTOMS: No  SKELETAL SYMPTOMS: No  BLOOD SYMPTOMS: No  NERVOUS SYSTEM SYMPTOMS: No  MENTAL HEALTH SYMPTOMS: No      Past Medical History:   Diagnosis Date     Chronic lymphocytic thyroiditis 7/20/2011     Chronic lymphocytic thyroiditis 7/20/2011     Chronic lymphocytic thyroiditis      Depressive disorder  1997    Treated for a couple of years; not currently experiencing     Diabetes 2009       Past Surgical History:   Procedure Laterality Date     HC TOOTH EXTRACTION W/FORCEP         Family History   Problem Relation Age of Onset     Hyperlipidemia Mother      Diabetes Brother      Hypertension Paternal Grandfather      Cerebrovascular Disease Paternal Grandfather      Hypertension Maternal Grandfather      Hyperlipidemia Maternal Grandfather      Cerebrovascular Disease Maternal Grandfather      Breast Cancer Maternal Aunt      Diabetes Brother      Anxiety Disorder Brother      Coronary Artery Disease Other      Maternal great grandfather     Hypertension Maternal Grandmother      Hyperlipidemia Maternal Grandmother      Hyperlipidemia Brother      Cerebrovascular Disease Paternal Grandmother      Asthma No family hx of      C.A.D. No family hx of      Cancer - colorectal No family hx of      Prostate Cancer No family hx of      Brother with T1D  at age 26 from [place]      Social History     Social History     Marital status:      Spouse name: N/A     Number of children: N/A     Years of education: N/A     Social History Main Topics     Smoking status: Never Smoker     Smokeless tobacco: Never Used     Alcohol use Yes      Comment: ~ 1 drink/day     Drug use: No     Sexual activity: Yes     Partners: Female     Birth control/ protection: Condom, Natural Family Planning      Comment: Would like to talk about vasectomy     Other Topics Concern     Parent/Sibling W/ Cabg, Mi Or Angioplasty Before 65f 55m? No     Social History Narrative   Social Hx:   .  Has a young son.  Works a  at the [employer].    Has an exchange student currently living at his house.    Current Outpatient Prescriptions   Medication     ACE/ARB NOT PRESCRIBED, INTENTIONAL,     ASPIRIN 81 MG OR TABS     atorvastatin (LIPITOR) 10 MG tablet     blood glucose (ESTEVAN BREEZE 2) test strip      "Blood Glucose Monitoring Suppl (Lomb[employer] SoftwareEZE 2 SYSTEM) W/DEVICE KIT     Injection Device for insulin (NOVOPEN JR, GREEN,) DUSTIN     insulin glargine (LANTUS SOLOSTAR) 100 UNIT/ML pen     insulin pen needle (B-D U/F) 31G X 5 MM     NOVOLOG PENFILL 100 UNIT/ML soln     Sodium Fluoride (CLINPRO 5000) 1.1 % PSTE     VITAMIN D, CHOLECALCIFEROL, PO     glucagon (GLUCAGON EMERGENCY) 1 MG injection     loratadine (CLARITIN) 10 MG tablet     No current facility-administered medications for this visit.           Allergies   Allergen Reactions     Ceftin Rash     Ampicillin Rash       Physical Exam  /73  Pulse 69  Ht 1.778 m (5' 10\")  Wt 80 kg (176 lb 4.8 oz)  BMI 25.3 kg/m2  Constitutional: Appears well-developed and well-nourished. Active.   HEENT: MMM.   Thyroid:  no obvious nodules on exam, nontender   Cardiovascular: RRR, S1, S2 normal. No m/g/r   Pulmonary/Chest: Normal effort on RA. CTAB. No wheezing or rales   Abdominal: soft, NT. Normoactive bowel sounds.   Neurological: Alert. Speech fluent and articulate. No focal deficits on limited exam.   Extremities: No clubbing, cyanosis or inflammation. No LE edema.   Skin: Warm, dry. No rashes anywhere visualized. Feet exam without neuropathy by proprioception test  Lymphatic: no cervical lymphadenopathy.  Psychological: appropriate mood and affect         Labs/Data   Data reviewed  4/18/2018 Creatinine 0.86          TSH 2.20    A1C today 7.6 (7.3 on 2/5/2018, 7.3 10/31 2017, 6.9 7/10/2017)    11/2009 TPO 70      Assessment/Plan:   Franklin Muir is a very pleasant 48 year-old male patient with a 8 years history of type 1 diabetes, with reasonable glycemic control and no chronic complications    1.  Type 1 diabetes:   A1c is up to 7.9% from prior. His blood sugar is high around 9 am and night time. The 9 am high is an expected curve due to breakfast but the return to pre-meal sugar curve lack behind likely due to inadequate coverage or the creamer in the coffee. The " nighttime high range around 180-300. He did not have hypoglycemic symptoms in the middle of the night. Possible contribution could be the snack he took at night or due to dropping of lantus level along with his reduced activity level.  - increase lantus to 11 units at night time  - increase the breakfast meal coverage a little bit more (0.5-1 unit) to cover the coffee/cream     2.  Chronic complications of diabetes:  There is no evidence of chronic diabetic complications.   Retinopathy:  Next Dec 2018  Nephropathy:  BP well controlled. No microalbuminuria.  Normal GFR.   Neuropathy: None.    Feet: OK, no ulcers.   Lipids:  LDL at target.  Patient taking statin.   ASCVD risk is only 1.5%. We suggested him that the benefit of aspirin is lower than the risk at this point.      Orders Placed This Encounter   Procedures     Hemoglobin A1c POCT        Patient seen and discussed with Dr. Wilson.   Leilani Galaviz MD  PGY3 Internal Medicine  [phone]      ATTENDING NOTE  I have seen and examined the patient, reviewed and edited the resident's note, and agree with the plan of care.    Freya Wilson MD PhD    Division of Endocrinology and Diabetes

## 2018-11-13 NOTE — LETTER
11/13/2018       RE: Franklin Muir  [address]  [place] [address]     Dear Colleague,    Thank you for referring your patient, Franklin Muir, to the [facility] ENDOCRINOLOGY at [facility]. Please see a copy of my visit note below.    Endocrinology Clinic  11/13/2018  Franklin Muir     HPI:  Franklin is a 49 yo man here for follow up of type 1 diabetes Dx in 2009 ( and anti DEE > 250 ).  He recently established care  after Dr. Swenson left, and he is now also seeing Liz Conroy in our clinic.      Current regimen  - Lantus to 10 units daily at dinner  - Novolog 1 unit/ 20 grams of CHO with meals and snacks,   - pre-meal correction @ 1/2 U per 30 mg/dL above 150 mg/dL    Glycemic data  Last seen 8/2018 with reduction of lantus from 11 to 10 unit    No medical updates. The normal things are still affecting his blood sugar. Coffee seems to bring the BS up. He snacks quite a bit in the night time. The felt the meal coverage of insulin is pretty good. The exercise is getting more sporadic with academic year. He started wearing the CGM (Dexcom g6) since September. Occasional low with symptoms. We reviewed the CGM data with the patient as detail in A&P     Diabetes complications  Retinopathy:  Dec 21 2018 to see optho  Nephropathy:  negative 2018  Neuropathy: No neuropathy  Macrovascular: on atorvastatin 10 mg with LDL 66    He also has a history of Hashimoto's ab positivity with normal function, has never been on levothyroxine replacement therapy. He generally has been feeling well and has no concerns today.     ROS   11 point ROS is as detailed in the HPI above, as per patient below, or negative      Past Medical History:   Diagnosis Date     Chronic lymphocytic thyroiditis 7/20/2011     Chronic lymphocytic thyroiditis 7/20/2011     Chronic lymphocytic thyroiditis      Depressive disorder 06/01/1997    Treated for a couple of years; not currently  experiencing     Diabetes 2009       Past Surgical History:   Procedure Laterality Date     HC TOOTH EXTRACTION W/FORCEP         Family History   Problem Relation Age of Onset     Hyperlipidemia Mother      Diabetes Brother      Hypertension Paternal Grandfather      Cerebrovascular Disease Paternal Grandfather      Hypertension Maternal Grandfather      Hyperlipidemia Maternal Grandfather      Cerebrovascular Disease Maternal Grandfather      Breast Cancer Maternal Aunt      Diabetes Brother      Anxiety Disorder Brother      Coronary Artery Disease Other      Maternal great grandfather     Hypertension Maternal Grandmother      Hyperlipidemia Maternal Grandmother      Hyperlipidemia Brother      Cerebrovascular Disease Paternal Grandmother      Asthma No family hx of      C.A.D. No family hx of      Cancer - colorectal No family hx of      Prostate Cancer No family hx of      Brother with T1D  at age 26 from [facility]      Social History     Social History     Marital status:      Spouse name: N/A     Number of children: N/A     Years of education: N/A     Social History Main Topics     Smoking status: Never Smoker     Smokeless tobacco: Never Used     Alcohol use Yes      Comment: ~ 1 drink/day     Drug use: No     Sexual activity: Yes     Partners: Female     Birth control/ protection: Condom, Natural Family Planning      Comment: Would like to talk about vasectomy     Other Topics Concern     Parent/Sibling W/ Cabg, Mi Or Angioplasty Before 65f 55m? No     Social History Narrative   Social Hx:   .  Has a young son.  Works a  at the [employer].    Has an exchange student currently living at his house.    Current Outpatient Prescriptions   Medication     ACE/ARB NOT PRESCRIBED, INTENTIONAL,     ASPIRIN 81 MG OR TABS     atorvastatin (LIPITOR) 10 MG tablet     blood glucose (ESTEVAN BREEZE 2) test strip     Blood Glucose Monitoring Suppl (Forest [employer] GroupEZE 2 SYSTEM)  "W/DEVICE KIT     Injection Device for insulin (NOVOPEN JR, GREEN,) DUSTIN     insulin glargine (LANTUS SOLOSTAR) 100 UNIT/ML pen     insulin pen needle (B-D U/F) 31G X 5 MM     NOVOLOG PENFILL 100 UNIT/ML soln     Sodium Fluoride (CLINPRO 5000) 1.1 % PSTE     VITAMIN D, CHOLECALCIFEROL, PO     glucagon (GLUCAGON EMERGENCY) 1 MG injection     loratadine (CLARITIN) 10 MG tablet     No current facility-administered medications for this visit.           Allergies   Allergen Reactions     Ceftin Rash     Ampicillin Rash       Physical Exam  /73  Pulse 69  Ht 1.778 m (5' 10\")  Wt 80 kg (176 lb 4.8 oz)  BMI 25.3 kg/m2  Constitutional: Appears well-developed and well-nourished. Active.   HEENT: MMM.   Thyroid:  no obvious nodules on exam, nontender   Cardiovascular: RRR, S1, S2 normal. No m/g/r   Pulmonary/Chest: Normal effort on RA. CTAB. No wheezing or rales   Abdominal: soft, NT. Normoactive bowel sounds.   Neurological: Alert. Speech fluent and articulate. No focal deficits on limited exam.   Extremities: No clubbing, cyanosis or inflammation. No LE edema.   Skin: Warm, dry. No rashes anywhere visualized. Feet exam without neuropathy by proprioception test  Lymphatic: no cervical lymphadenopathy.  Psychological: appropriate mood and affect         Labs/Data   Data reviewed  4/18/2018 Creatinine 0.86          TSH 2.20    A1C today 7.6 (7.3 on 2/5/2018, 7.3 10/31 2017, 6.9 7/10/2017)    11/2009 TPO 70    Assessment/Plan:   Franklin Muir is a very pleasant 48 year-old male patient with a 8 years history of type 1 diabetes, with reasonable glycemic control and no chronic complications    1.  Type 1 diabetes:   A1c is up to 7.9% from prior. His blood sugar is high around 9 am and night time. The 9 am high is an expected curve due to breakfast but the return to pre-meal sugar curve lack behind likely due to inadequate coverage or the creamer in the coffee. The nighttime high range around 180-300. He did not have " hypoglycemic symptoms in the middle of the night. Possible contribution could be the snack he took at night or due to dropping of lantus level along with his reduced activity level.  - increase lantus to 11 units at night time  - increase the breakfast meal coverage a little bit more (0.5-1 unit) to cover the coffee/cream     2.  Chronic complications of diabetes:  There is no evidence of chronic diabetic complications.   Retinopathy:  Next Dec 2018  Nephropathy:  BP well controlled. No microalbuminuria.  Normal GFR.   Neuropathy: None.    Feet: OK, no ulcers.   Lipids:  LDL at target.  Patient taking statin.   ASCVD risk is only 1.5%. We suggested him that the benefit of aspirin is lower than the risk at this point.    Orders Placed This Encounter   Procedures     Hemoglobin A1c POCT       Patient seen and discussed with Dr. Wilson.   Leilani Galaviz MD  PGY3 Internal Medicine  [phone]    ATTENDING NOTE  I have seen and examined the patient, reviewed and edited the resident's note, and agree with the plan of care.    Freya Wilson MD PhD    Division of Endocrinology and Diabetes

## 2019-01-03 ENCOUNTER — PRE VISIT (OUTPATIENT)
Dept: UROLOGY | Facility: CLINIC | Age: 49
End: 2019-01-03

## 2019-01-03 NOTE — PROGRESS NOTES
Patient coming in for vasectomy procedure.    Unable to reach patient to discuss vasectomy prep which includes: stopping all blood thinners for seven days prior to procedure, eating a meal prior to procedure, and shaving the hair from the base of the penis and scrotum the night prior to procedure.    Left message for patient to call clinic back or review My Chart message and send message back.

## 2019-01-10 ENCOUNTER — OFFICE VISIT (OUTPATIENT)
Dept: UROLOGY | Facility: CLINIC | Age: 49
End: 2019-01-10
Payer: COMMERCIAL

## 2019-01-10 VITALS
HEART RATE: 75 BPM | DIASTOLIC BLOOD PRESSURE: 75 MMHG | BODY MASS INDEX: 24.65 KG/M2 | WEIGHT: 172.2 LBS | HEIGHT: 70 IN | SYSTOLIC BLOOD PRESSURE: 114 MMHG

## 2019-01-10 DIAGNOSIS — Z30.2 ENCOUNTER FOR VASECTOMY: Primary | ICD-10-CM

## 2019-01-10 RX ORDER — LIDOCAINE HYDROCHLORIDE 20 MG/ML
10 INJECTION, SOLUTION INFILTRATION; PERINEURAL ONCE
Status: COMPLETED | OUTPATIENT
Start: 2019-01-10 | End: 2019-01-10

## 2019-01-10 RX ADMIN — LIDOCAINE HYDROCHLORIDE 10 ML: 20 INJECTION, SOLUTION INFILTRATION; PERINEURAL at 14:12

## 2019-01-10 ASSESSMENT — PAIN SCALES - GENERAL: PAINLEVEL: NO PAIN (0)

## 2019-01-10 ASSESSMENT — MIFFLIN-ST. JEOR: SCORE: 1667.34

## 2019-01-10 NOTE — LETTER
1/10/2019       RE: Franklin Muir  [address]  [place] [address]     Dear Colleague,    Thank you for referring your patient, Franklin Muir, to the [facility] UROLOGY AND INST FOR PROSTATE AND UROLOGIC CANCERS at [facility]. Please see a copy of my visit note below.    Procedure: Vasectomy bilateral.   Anesthesia: Local lidocaine injection by surgeon.  Surgeon: NICKY Blunt M.D.   Assistant:  INGRID Gonzalez MD    Description of procedure: After the patient received education material regarding vasectomy, including risks and benefits, we proceeded with obtaining consent and proceeded with the surgery. He understands that there is a risk of late failure from recanalization. He understands that he is fertile until he has completed one or more semen analyses and he is given clearance from us for unprotected intercourse.  He understands that we will contact regarding the results but it is his responsibility to make sure he is cleared before having unprotected intercourse.  I have discussed with him other risks including bleeding, infection, acute and possible long-term pain.    The right vas was ligated first.  This was done using the standard technique by grasping it with a three-finger  and lifting it up to the skin.  A small amount of lidocaine was infiltrated into the skin and vasal sheath.  The skin was punctured and dilated bluntly using the scalpel-less dissector.  The sheath was identified and a rigid clamp was passed around the vas which was then lifted out of the incision.  The vas was cleaned off from the deferential vessels and the sheath, and cautery was used to divide the vas between mosquitos.  A small segment was removed and discarded.  The lumen of the vas was cannulated to confirm its identity, and the lumens of both ends were cauterized.  Pen cautery was used sparingly/as needed for minor bleeders.  A facial interposition was then performed with a single  suture of 4-0 chromic. The skin defect was closed with a 4-0 chromic interrupted suture after confirming adequate hemostasis.       We then turned our attention to the left side and a similar technique was performed. This involved division, excision of a segment, cautery of the lumens, and fascial interposition.    There were no hematomas noted at the end of the procedure.  The patient tolerated the procedure well.    He was advised to return for a post vasectomy semen check in 3 months, and that he is not sterile and must continue to use contraception until we tell him otherwise (based on follow-up semen specimen(s)).    Plan:  -Post vasectomy semen analysis in 3 months   -ice packs to scrotum X 24h  -keep incision dry X 48h  -Rx for  Tylenol #3        Lewis Blunt MD

## 2019-01-10 NOTE — NURSING NOTE
"Chief Complaint   Patient presents with     Sterilization     Vasectomy       Blood pressure 114/75, pulse 75, height 1.778 m (5' 10\"), weight 78.1 kg (172 lb 3.2 oz). Body mass index is 24.71 kg/m .    Patient Active Problem List   Diagnosis     CARDIOVASCULAR SCREENING; LDL GOAL LESS THAN 100     Diabetes mellitus type 1 (H)     Chronic lymphocytic thyroiditis     Type 1 diabetes mellitus with hyperglycemia (H)     Diabetes mellitus type 1, uncomplicated (H)     Type 1 diabetes mellitus with hypoglycemia and without coma (HCC)     Advance Care Planning       Allergies   Allergen Reactions     Ceftin Rash     Ampicillin Rash       Current Outpatient Medications   Medication Sig Dispense Refill     ACE/ARB NOT PRESCRIBED, INTENTIONAL, by Other route continuous prn.       atorvastatin (LIPITOR) 10 MG tablet Take 1 tablet (10 mg) by mouth daily 90 tablet 3     blood glucose (ESTEVAN BREEZE 2) test strip Test 5 times daily 450 each 3     Blood Glucose Monitoring Suppl (ASCENSIA BREEZE 2 SYSTEM) W/DEVICE KIT Use daily       glucagon (GLUCAGON EMERGENCY) 1 MG injection Use as directed (Patient not taking: Reported on 8/13/2018) 2 each 2     Injection Device for insulin (NOVOPEN JRJAIDEN,) DUSTIN Use as directed with insulin 1 each 1     insulin glargine (LANTUS SOLOSTAR) 100 UNIT/ML pen Inject Subcutaneous. Inject 9 units daily (Patient taking differently: 10 Units Inject Subcutaneous. Inject 9 units daily) 15 mL 3     insulin pen needle (B-D U/F) 31G X 5 MM Use 5 daily or as directed. 500 each 3     loratadine (CLARITIN) 10 MG tablet        NOVOLOG PENFILL 100 UNIT/ML soln 1 unit:20 grams of CHO with meals and snacks, and for pre-meal correction, approx 30units daily 30 mL 3     Sodium Fluoride (CLINPRO 5000) 1.1 % PSTE Apply  to affected area. Use once every night       VITAMIN D, CHOLECALCIFEROL, PO Take 1,000 Units by mouth daily         Social History     Tobacco Use     Smoking status: Never Smoker     Smokeless " tobacco: Never Used   Substance Use Topics     Alcohol use: Yes     Comment: ~ 1 drink/day     Drug use: No       THUY Stewart  1/10/2019  2:19 PM     Invasive Procedure Safety Checklist:    Procedure: Vasectomy    Action: Complete sections and checkboxes as appropriate.    Pre-procedure:  1. Patient ID Verified with 2 identifiers (Patrica and  or MRN) : YES    2. Procedure and site verified with patient/designee (when able) : YES    3. Accurate consent documentation in medical record : YES    4. H&P (or appropriate assessment) documented in medical record : YES  H&P must be up to 30 days prior to procedure an updated within 24 hours of                 Procedure as applicable.     5. Relevant diagnostic and radiology test results appropriately labeled and displayed as applicable : YES    6. Blood products, implants, devices, and/or special equipment available for the procedure as applicable : YES    7. Procedure site(s) marked with provider initials [Exclusions: None] : NO    8. Marking not required. Reason : Yes  Procedure does not require site marking    Time Out:     Time-Out performed immediately prior to starting procedure, including verbal and active participation of all team members addressing: YES    1. Correct patient identity.  2. Confirmed that the correct side and site are marked.  3. An accurate procedure to be done.  4. Agreement on the procedure to be done.  5. Correct patient position.  6. Relevant images and results are properly labeled and appropriately displayed.  7. The need to administer antibiotics or fluids for irrigation purposes during the procedure as applicable.  8. Safety precautions based on patient history or medication use.    During Procedure: Verification of correct person, site, and procedure occurs any time the responsibility for care of the patient is transferred to another member of the care team.      The following medication was given:     MEDICATION:  Lidocaine  without epinephrine  ROUTE: Administered by physician  SITE: Administered by physician  DOSE: 10 mL  LOT #: -K  : Hospira  EXPIRATION DATE: 9/1/2020  NDC#: 4787-5900-72   Was there drug waste? No    Prior to injection, verified patient identity using patient's name and date of birth.  Due to injection administration, patient instructed to remain in clinic for 15 minutes  afterwards, and to report any adverse reaction to me immediately.    Drug Amount Wasted:  None.  Vial/Syringe: Single dose vial    Yesenia Moctezuma CMA  January 10, 2019

## 2019-01-11 NOTE — PROGRESS NOTES
Procedure: Vasectomy bilateral.   Anesthesia: Local lidocaine injection by surgeon.  Surgeon: NICKY Blunt M.D.   Assistant:  INGRID Gonzalez MD    Description of procedure: After the patient received education material regarding vasectomy, including risks and benefits, we proceeded with obtaining consent and proceeded with the surgery. He understands that there is a risk of late failure from recanalization. He understands that he is fertile until he has completed one or more semen analyses and he is given clearance from us for unprotected intercourse.  He understands that we will contact regarding the results but it is his responsibility to make sure he is cleared before having unprotected intercourse.  I have discussed with him other risks including bleeding, infection, acute and possible long-term pain.    The right vas was ligated first.  This was done using the standard technique by grasping it with a three-finger  and lifting it up to the skin.  A small amount of lidocaine was infiltrated into the skin and vasal sheath.  The skin was punctured and dilated bluntly using the scalpel-less dissector.  The sheath was identified and a rigid clamp was passed around the vas which was then lifted out of the incision.  The vas was cleaned off from the deferential vessels and the sheath, and cautery was used to divide the vas between mosquitos.  A small segment was removed and discarded.  The lumen of the vas was cannulated to confirm its identity, and the lumens of both ends were cauterized.  Pen cautery was used sparingly/as needed for minor bleeders.  A facial interposition was then performed with a single suture of 4-0 chromic. The skin defect was closed with a 4-0 chromic interrupted suture after confirming adequate hemostasis.       We then turned our attention to the left side and a similar technique was performed. This involved division, excision of a segment, cautery of the lumens, and fascial interposition.    There  were no hematomas noted at the end of the procedure.  The patient tolerated the procedure well.    He was advised to return for a post vasectomy semen check in 3 months, and that he is not sterile and must continue to use contraception until we tell him otherwise (based on follow-up semen specimen(s)).    Plan:  -Post vasectomy semen analysis in 3 months   -ice packs to scrotum X 24h  -keep incision dry X 48h  -Rx for  Tylenol #3

## 2019-03-12 ENCOUNTER — OFFICE VISIT (OUTPATIENT)
Dept: ENDOCRINOLOGY | Facility: CLINIC | Age: 49
End: 2019-03-12
Payer: COMMERCIAL

## 2019-03-12 VITALS
HEART RATE: 73 BPM | HEIGHT: 70 IN | BODY MASS INDEX: 25.07 KG/M2 | SYSTOLIC BLOOD PRESSURE: 116 MMHG | WEIGHT: 175.1 LBS | DIASTOLIC BLOOD PRESSURE: 76 MMHG

## 2019-03-12 DIAGNOSIS — E10.9 TYPE 1 DIABETES MELLITUS WITHOUT COMPLICATION (H): Primary | ICD-10-CM

## 2019-03-12 DIAGNOSIS — B35.1 ONYCHOMYCOSIS: ICD-10-CM

## 2019-03-12 LAB — HBA1C MFR BLD: 7 % (ref 4.3–6)

## 2019-03-12 RX ORDER — INSULIN ASPART 100 [IU]/ML
INJECTION, SOLUTION INTRAVENOUS; SUBCUTANEOUS
Qty: 30 ML | Refills: 3 | Status: SHIPPED | OUTPATIENT
Start: 2019-03-12 | End: 2019-06-21

## 2019-03-12 ASSESSMENT — PAIN SCALES - GENERAL: PAINLEVEL: NO PAIN (0)

## 2019-03-12 ASSESSMENT — MIFFLIN-ST. JEOR: SCORE: 1680.5

## 2019-03-12 NOTE — LETTER
RE: Franklin Muir  [address]  [place] [address]     Dear Colleague,    Thank you for referring your patient, Franklin Muir, to the [facility] ENDOCRINOLOGY at [facility]. Please see a copy of my visit note below.    Endocrinology Clinic  03/12/2019  Franklin Muir     HPI:  Franklin is a 47 yo man here for follow up of type 1 diabetes Dx in 2009 ( and anti DEE > 250).  He established care with  after Dr. Swenson left, and he is now also seeing Liz Conroy in our clinic.      Current regimen:  - Lantus 11 units daily at dinner  - Novolog 1 unit/ 20 grams of CHO with meals and snacks  - pre-meal correction @ 1/2 U per 30 mg/dL above 150 mg/dL    CGMS data for past 30 days:  Average glucose 158 +/- 49 (decreased from 165 average from prior 30 days)  71% of readings within range (increased from 65% from prior 30 days), 28% above range, 0% below range  Minimal BG variability, but BG levels tend to be higher between 9AM and 12PM  Average calibrations per day: 0.2  Low alert setting = 70  Minimal hypoglycemia risk  A1c I 7.0% today    Franklin has noticed lows 2-3x/week primarily with moderate exertion when shoveling or mowing the lawn--tends to be in the afternoons. Usually begins to notice lows in the 60s, and reports that the Dexcom helps with this (he has had this since September). He continues to notice more highs in the mornings with breakfast, especially when drinking coffee with cream. He has not substantially increased his breakfast meal coverage, however. Reports weekly exercise 2-3x/week, and he does not normally experience symptomatic hypoglycemia with exercise.     Diabetes complications  Retinopathy:  Last saw Dec 2018, normal   Nephropathy: negative 2018  Neuropathy: No neuropathy  Macrovascular: on atorvastatin 10 mg with LDL 66    He also has a history of Hashimoto's lab positivity with normal function, has never been on levothyroxine  replacement therapy. He generally has been feeling well and has no concerns today.     Past Medical History:   Diagnosis Date     Chronic lymphocytic thyroiditis 2011     Chronic lymphocytic thyroiditis 2011     Chronic lymphocytic thyroiditis      Depressive disorder 1997    Treated for a couple of years; not currently experiencing     Diabetes 2009       Past Surgical History:   Procedure Laterality Date     HC TOOTH EXTRACTION W/FORCEP         Family History   Problem Relation Age of Onset     Hyperlipidemia Mother      Diabetes Brother      Hypertension Paternal Grandfather      Cerebrovascular Disease Paternal Grandfather      Hypertension Maternal Grandfather      Hyperlipidemia Maternal Grandfather      Cerebrovascular Disease Maternal Grandfather      Breast Cancer Maternal Aunt      Diabetes Brother      Anxiety Disorder Brother      Coronary Artery Disease Other         Maternal great grandfather     Hypertension Maternal Grandmother      Hyperlipidemia Maternal Grandmother      Hyperlipidemia Brother      Cerebrovascular Disease Paternal Grandmother      Asthma No family hx of      C.A.D. No family hx of      Cancer - colorectal No family hx of      Prostate Cancer No family hx of      Brother with T1D  at age 26 from [facility]      Social History     Social History     Marital status:      Spouse name: N/A     Number of children: N/A     Years of education: N/A     Social History Main Topics     Smoking status: Never Smoker     Smokeless tobacco: Never Used     Alcohol use Yes      Comment: ~ 1 drink/day     Drug use: No     Sexual activity: Yes     Partners: Female     Birth control/ protection: Condom, Natural Family Planning      Comment: Would like to talk about vasectomy     Other Topics Concern     Parent/Sibling W/ Cabg, Mi Or Angioplasty Before 65f 55m? No     Social History Narrative   Social Hx:   .  Has a young son.  Works a  at the  "[employer].    Has an exchange student currently living at his house.    Current Outpatient Medications   Medication     ACE/ARB NOT PRESCRIBED, INTENTIONAL,          atorvastatin (LIPITOR) 10 MG tablet     blood glucose (ESTEVAN BREEZE 2) test strip     Blood Glucose Monitoring Suppl (uTaP BREEZE 2 SYSTEM) W/DEVICE KIT     Injection Device for insulin (NOVOPEN JR, GREEN,) DUSTIN     insulin glargine (LANTUS SOLOSTAR) 100 UNIT/ML pen     insulin pen needle (B-D U/F) 31G X 5 MM     loratadine (CLARITIN) 10 MG tablet     NOVOLOG PENFILL 100 UNIT/ML soln     Sodium Fluoride (CLINPRO 5000) 1.1 % PSTE     VITAMIN D, CHOLECALCIFEROL, PO     glucagon (GLUCAGON EMERGENCY) 1 MG injection     No current facility-administered medications for this visit.           Allergies   Allergen Reactions     Ceftin Rash     Ampicillin Rash       Physical Exam  /76   Pulse 73   Ht 1.778 m (5' 10\")   Wt 79.4 kg (175 lb 1.6 oz)   BMI 25.12 kg/m     Constitutional: Appears well-developed and well-nourished. Active.   HEENT: MMM.   Thyroid:  no obvious nodules on exam, nontender   Cardiovascular: RRR, S1, S2 normal. No m/g/r   Pulmonary/Chest: Normal effort on RA. CTAB. No wheezing or rales   Abdominal: soft, NT. Normoactive bowel sounds.   Neurological: Alert. Speech fluent and articulate. No focal deficits on limited exam.   Extremities:  onychomycosis of digits 1 and 3-5 of left foot. No clubbing, cyanosis or inflammation. No LE edema.   Skin: Warm, dry. No rashes anywhere visualized. Feet exam without neuropathy by proprioception test  Lymphatic: no cervical lymphadenopathy.  Psychological: appropriate mood and affect         Labs/Data   Data reviewed  4/18/2018 Creatinine 0.86          TSH 2.20    A1C today 7.0 (7.9 on 11/13/2018, 7.7 8/13/18, 7.6 4/24/18)    11/2009 TPO 70      Assessment/Plan:   Franklin Muir is a very pleasant 48 year-old male patient with a 9 year history of type 1 diabetes, with reasonable " glycemic control and no chronic complications.    1.  Type 1 diabetes:   A1c is down to 7.0% from prior 7.9%. His blood sugar continues to be high around 9 am, with peaks also in the afternoon and at night. 9am (high range appears to be 180-300s) is an expected curve due to breakfast but there is a lag in return to pre-meal sugar again likely due to inadequate meal coverage or the creamer in the coffee. Afternoon highs range 180-250. The nighttime highs range 180-300s. There are a few instances of early morning (primarily 2-4am) lows. Overall he appears to be doing well, range of average readings is tight,and A1C improved from last check. Discussed with patient strategies for decreasing A1C further through diet and increased coverage for AM highs, although will not make any major change sin insulin regimen at this time as it appears to overall be working well for him.   - Continue current regimen of Lantus 11 units at dinner  - Continue mealtime coverage as above, may consider increasing the breakfast meal coverage a little bit more (0.5-1 unit) to cover the coffee/cream given persistent highs following breakfast   - Yearly labs ordered as below     2.  Chronic complications of diabetes:  There is no evidence of chronic diabetic complications.   Retinopathy:  Last visit Dec 2018, normal   Nephropathy:  BP well controlled. No microalbuminuria.  Normal GFR.   Neuropathy: None.    Feet: onychomycosis of the left foot, no ulcers.   Lipids:  LDL at target.  Patient taking statin.   ASCVD risk is only 1.5%. We have previously discussed that the benefit of aspirin is lower than the risk at this point.    3. Onychomycosis of left foot: Took oral antifungal years ago but has experienced recurrent symptoms over past few years. Given general recommendations and will refer to dermatology as patient has use oral agents in the past.  - Referral to dermatology        Orders Placed This Encounter   Procedures     25 Hydroxyvitamin  D2 and D3     Lipid Profile     TSH     Albumin Random Urine Quantitative with Creat Ratio     Creatinine     Hepatic panel     DERMATOLOGY REFERRAL     Hemoglobin A1c POCT        Patient seen and discussed with attending physician Dr. Wilson.   Yesenia Vaz, MS3      Physician Attestation   I, YESSENIA Wilson, was present with the medical student who participated in the service and in the documentation of the note.  I have verified the history and personally performed the physical exam and medical decision making.  I agree with the assessment and plan of care as documented in the note.      Items personally reviewed: vitals, physical exam, labs and CGMS data and agree with the interpretation documented in the note.    YESSENIA Wilson MD

## 2019-03-12 NOTE — PROGRESS NOTES
Endocrinology Clinic  03/12/2019  Franklin Muir     HPI:  Franklin is a 47 yo man here for follow up of type 1 diabetes Dx in 2009 ( and anti DEE > 250).  He established care with  after Dr. Swenson left, and he is now also seeing Liz Conroy in our clinic.      Current regimen:  - Lantus 11 units daily at dinner  - Novolog 1 unit/ 20 grams of CHO with meals and snacks  - pre-meal correction @ 1/2 U per 30 mg/dL above 150 mg/dL    CGMS data for past 30 days:  Average glucose 158 +/- 49 (decreased from 165 average from prior 30 days)  71% of readings within range (increased from 65% from prior 30 days), 28% above range, 0% below range  Minimal BG variability, but BG levels tend to be higher between 9AM and 12PM  Average calibrations per day: 0.2  Low alert setting = 70  Minimal hypoglycemia risk  A1c I 7.0% today    Franklin has noticed lows 2-3x/week primarily with moderate exertion when shoveling or mowing the lawn--tends to be in the afternoons. Usually begins to notice lows in the 60s, and reports that the Dexcom helps with this (he has had this since September). He continues to notice more highs in the mornings with breakfast, especially when drinking coffee with cream. He has not substantially increased his breakfast meal coverage, however. Reports weekly exercise 2-3x/week, and he does not normally experience symptomatic hypoglycemia with exercise.     Diabetes complications  Retinopathy: Last saw Dec 2018, normal   Nephropathy: negative 2018  Neuropathy: No neuropathy  Macrovascular: on atorvastatin 10 mg with LDL 66    He also has a history of Hashimoto's lab positivity with normal function, has never been on levothyroxine replacement therapy. He generally has been feeling well and has no concerns today.     ROS   11 point ROS is as detailed in the HPI above, as per patient below, or negative      Past Medical History:   Diagnosis Date     Chronic lymphocytic thyroiditis 7/20/2011     Chronic  lymphocytic thyroiditis 2011     Chronic lymphocytic thyroiditis      Depressive disorder 1997    Treated for a couple of years; not currently experiencing     Diabetes 2009       Past Surgical History:   Procedure Laterality Date     HC TOOTH EXTRACTION W/FORCEP         Family History   Problem Relation Age of Onset     Hyperlipidemia Mother      Diabetes Brother      Hypertension Paternal Grandfather      Cerebrovascular Disease Paternal Grandfather      Hypertension Maternal Grandfather      Hyperlipidemia Maternal Grandfather      Cerebrovascular Disease Maternal Grandfather      Breast Cancer Maternal Aunt      Diabetes Brother      Anxiety Disorder Brother      Coronary Artery Disease Other         Maternal great grandfather     Hypertension Maternal Grandmother      Hyperlipidemia Maternal Grandmother      Hyperlipidemia Brother      Cerebrovascular Disease Paternal Grandmother      Asthma No family hx of      C.A.D. No family hx of      Cancer - colorectal No family hx of      Prostate Cancer No family hx of      Brother with T1D  at age 26 from [facility]      Social History     Social History     Marital status:      Spouse name: N/A     Number of children: N/A     Years of education: N/A     Social History Main Topics     Smoking status: Never Smoker     Smokeless tobacco: Never Used     Alcohol use Yes      Comment: ~ 1 drink/day     Drug use: No     Sexual activity: Yes     Partners: Female     Birth control/ protection: Condom, Natural Family Planning      Comment: Would like to talk about vasectomy     Other Topics Concern     Parent/Sibling W/ Cabg, Mi Or Angioplasty Before 65f 55m? No     Social History Narrative   Social Hx:   .  Has a young son.  Works a  at the [employer].    Has an exchange student currently living at his house.    Current Outpatient Medications   Medication     ACE/ARB NOT PRESCRIBED, INTENTIONAL,          atorvastatin  "(LIPITOR) 10 MG tablet     blood glucose (ESTEVAN BREEZE 2) test strip     Blood Glucose Monitoring Suppl ([employer] BREEZE 2 SYSTEM) W/DEVICE KIT     Injection Device for insulin (NOVOPEN JR, JAIDEN,) DUSTIN     insulin glargine (LANTUS SOLOSTAR) 100 UNIT/ML pen     insulin pen needle (B-D U/F) 31G X 5 MM     loratadine (CLARITIN) 10 MG tablet     NOVOLOG PENFILL 100 UNIT/ML soln     Sodium Fluoride (CLINPRO 5000) 1.1 % PSTE     VITAMIN D, CHOLECALCIFEROL, PO     glucagon (GLUCAGON EMERGENCY) 1 MG injection     No current facility-administered medications for this visit.           Allergies   Allergen Reactions     Ceftin Rash     Ampicillin Rash       Physical Exam  /76   Pulse 73   Ht 1.778 m (5' 10\")   Wt 79.4 kg (175 lb 1.6 oz)   BMI 25.12 kg/m    Constitutional: Appears well-developed and well-nourished. Active.   HEENT: MMM.   Thyroid:  no obvious nodules on exam, nontender   Cardiovascular: RRR, S1, S2 normal. No m/g/r   Pulmonary/Chest: Normal effort on RA. CTAB. No wheezing or rales   Abdominal: soft, NT. Normoactive bowel sounds.   Neurological: Alert. Speech fluent and articulate. No focal deficits on limited exam.   Extremities: onychomycosis of digits 1 and 3-5 of left foot. No clubbing, cyanosis or inflammation. No LE edema.   Skin: Warm, dry. No rashes anywhere visualized. Feet exam without neuropathy by proprioception test  Lymphatic: no cervical lymphadenopathy.  Psychological: appropriate mood and affect         Labs/Data   Data reviewed  4/18/2018 Creatinine 0.86          TSH 2.20    A1C today 7.0 (7.9 on 11/13/2018, 7.7 8/13/18, 7.6 4/24/18)    11/2009 TPO 70      Assessment/Plan:   Franklin Muir is a very pleasant 48 year-old male patient with a 9 year history of type 1 diabetes, with reasonable glycemic control and no chronic complications.    1.  Type 1 diabetes:   A1c is down to 7.0% from prior 7.9%. His blood sugar continues to be high around 9 am, with peaks also in the afternoon and at " night. 9am (high range appears to be 180-300s) is an expected curve due to breakfast but there is a lag in return to pre-meal sugar again likely due to inadequate meal coverage or the creamer in the coffee. Afternoon highs range 180-250. The nighttime highs range 180-300s. There are a few instances of early morning (primarily 2-4am) lows. Overall he appears to be doing well, range of average readings is tight,and A1C improved from last check. Discussed with patient strategies for decreasing A1C further through diet and increased coverage for AM highs, although will not make any major change sin insulin regimen at this time as it appears to overall be working well for him.   - Continue current regimen of Lantus 11 units at dinner  - Continue mealtime coverage as above, may consider increasing the breakfast meal coverage a little bit more (0.5-1 unit) to cover the coffee/cream given persistent highs following breakfast   - Yearly labs ordered as below     2.  Chronic complications of diabetes:  There is no evidence of chronic diabetic complications.   Retinopathy:  Last visit Dec 2018, normal   Nephropathy:  BP well controlled. No microalbuminuria.  Normal GFR.   Neuropathy: None.    Feet: onychomycosis of the left foot, no ulcers.   Lipids:  LDL at target.  Patient taking statin.   ASCVD risk is only 1.5%. We have previously discussed that the benefit of aspirin is lower than the risk at this point.    3. Onychomycosis of left foot: Took oral antifungal years ago but has experienced recurrent symptoms over past few years. Given general recommendations and will refer to dermatology as patient has use oral agents in the past.  - Referral to dermatology        Orders Placed This Encounter   Procedures     25 Hydroxyvitamin D2 and D3     Lipid Profile     TSH     Albumin Random Urine Quantitative with Creat Ratio     Creatinine     Hepatic panel     DERMATOLOGY REFERRAL     Hemoglobin A1c POCT        Patient seen and  discussed with attending physician Dr. Wilson.   Yesenia Vaz, MS3      Physician Attestation   I, YESSENIA Wilson, was present with the medical student who participated in the service and in the documentation of the note.  I have verified the history and personally performed the physical exam and medical decision making.  I agree with the assessment and plan of care as documented in the note.      Items personally reviewed: vitals, physical exam, labs and CGMS data and agree with the interpretation documented in the note.    YESSENIA Wilson MD

## 2019-03-13 DIAGNOSIS — E10.9 TYPE 1 DIABETES MELLITUS WITHOUT COMPLICATION (H): ICD-10-CM

## 2019-03-21 ENCOUNTER — TELEPHONE (OUTPATIENT)
Dept: ENDOCRINOLOGY | Facility: CLINIC | Age: 49
End: 2019-03-21

## 2019-03-21 NOTE — TELEPHONE ENCOUNTER
[facility] Call Center    Phone Message    May a detailed message be left on voicemail: yes    Reason for Call: Other: Dacia Rosado, a nurse practitioner from [facility] called and would like a call back from a nurse or Dr. Wilson to get clarification on pt's oral vaccine. Please call back Dacia. Thanks.     Action Taken: Message routed to:  [facility] ([facility]): Endo

## 2019-03-22 NOTE — TELEPHONE ENCOUNTER
Message left  For Dacia   With nurse line  Number to call back. We usually do not  Order vaccines so I am not sure if she wanted Endocrine or his PCP.

## 2019-03-25 ENCOUNTER — TELEPHONE (OUTPATIENT)
Dept: ENDOCRINOLOGY | Facility: CLINIC | Age: 49
End: 2019-03-25

## 2019-03-25 NOTE — TELEPHONE ENCOUNTER
Liz Conroy, Shavon Mancera, RN   Caller: Unspecified (Today,  2:57 PM)             Ok to give vaccine.  Thanks,   Liz      Spoke w/ Dacia and confirmed ok  Per Liz Conroy     RE  Provider with [facility] called - is ordering live vaccine dose x4 for Typhoid, as Pt travels extensively. Is asking if live vaccine for typhoid is contraindicated for this Pt related to DM 1 Dx.  Dacia

## 2019-04-10 ENCOUNTER — MYC MEDICAL ADVICE (OUTPATIENT)
Dept: ENDOCRINOLOGY | Facility: CLINIC | Age: 49
End: 2019-04-10

## 2019-04-10 DIAGNOSIS — E10.9 DIABETES MELLITUS TYPE 1 (H): ICD-10-CM

## 2019-05-24 DIAGNOSIS — E10.9 TYPE 1 DIABETES MELLITUS WITHOUT COMPLICATION (H): ICD-10-CM

## 2019-05-24 LAB
ALBUMIN SERPL-MCNC: 4 G/DL (ref 3.4–5)
ALP SERPL-CCNC: 85 U/L (ref 40–150)
ALT SERPL W P-5'-P-CCNC: 25 U/L (ref 0–70)
AST SERPL W P-5'-P-CCNC: 22 U/L (ref 0–45)
BILIRUB DIRECT SERPL-MCNC: 0.1 MG/DL (ref 0–0.2)
BILIRUB SERPL-MCNC: 0.4 MG/DL (ref 0.2–1.3)
CHOLEST SERPL-MCNC: 122 MG/DL
CREAT SERPL-MCNC: 0.88 MG/DL (ref 0.66–1.25)
GFR SERPL CREATININE-BSD FRML MDRD: >90 ML/MIN/{1.73_M2}
HDLC SERPL-MCNC: 50 MG/DL
LDLC SERPL CALC-MCNC: 63 MG/DL
NONHDLC SERPL-MCNC: 72 MG/DL
PROT SERPL-MCNC: 7.8 G/DL (ref 6.8–8.8)
TRIGL SERPL-MCNC: 43 MG/DL
TSH SERPL DL<=0.005 MIU/L-ACNC: 1.89 MU/L (ref 0.4–4)

## 2019-05-24 PROCEDURE — 82043 UR ALBUMIN QUANTITATIVE: CPT | Performed by: INTERNAL MEDICINE

## 2019-05-24 PROCEDURE — 82306 VITAMIN D 25 HYDROXY: CPT | Performed by: INTERNAL MEDICINE

## 2019-05-24 PROCEDURE — 80061 LIPID PANEL: CPT | Performed by: INTERNAL MEDICINE

## 2019-05-24 PROCEDURE — 84443 ASSAY THYROID STIM HORMONE: CPT | Performed by: INTERNAL MEDICINE

## 2019-05-24 PROCEDURE — 82565 ASSAY OF CREATININE: CPT | Performed by: INTERNAL MEDICINE

## 2019-05-24 PROCEDURE — 80076 HEPATIC FUNCTION PANEL: CPT | Performed by: INTERNAL MEDICINE

## 2019-05-24 PROCEDURE — 36415 COLL VENOUS BLD VENIPUNCTURE: CPT | Performed by: INTERNAL MEDICINE

## 2019-05-25 LAB
CREAT UR-MCNC: 87 MG/DL
MICROALBUMIN UR-MCNC: <5 MG/L
MICROALBUMIN/CREAT UR: NORMAL MG/G CR (ref 0–17)

## 2019-05-29 LAB
DEPRECATED CALCIDIOL+CALCIFEROL SERPL-MC: <45 UG/L (ref 20–75)
VITAMIN D2 SERPL-MCNC: <5 UG/L
VITAMIN D3 SERPL-MCNC: 40 UG/L

## 2019-06-02 DIAGNOSIS — E78.2 MIXED HYPERLIPIDEMIA: ICD-10-CM

## 2019-06-02 RX ORDER — ATORVASTATIN CALCIUM 10 MG/1
10 TABLET, FILM COATED ORAL DAILY
Qty: 90 TABLET | Refills: 3 | Status: SHIPPED | OUTPATIENT
Start: 2019-06-02 | End: 2020-06-29

## 2019-06-02 ASSESSMENT — ENCOUNTER SYMPTOMS
WEIGHT GAIN: 0
DECREASED APPETITE: 1
WEIGHT LOSS: 0
POLYPHAGIA: 0
POLYDIPSIA: 0
NIGHT SWEATS: 0
CHILLS: 0
ALTERED TEMPERATURE REGULATION: 0
FATIGUE: 0
FEVER: 1
HALLUCINATIONS: 0
INCREASED ENERGY: 1

## 2019-06-03 ENCOUNTER — OFFICE VISIT (OUTPATIENT)
Dept: ENDOCRINOLOGY | Facility: CLINIC | Age: 49
End: 2019-06-03
Payer: COMMERCIAL

## 2019-06-03 VITALS
HEART RATE: 85 BPM | BODY MASS INDEX: 24.95 KG/M2 | DIASTOLIC BLOOD PRESSURE: 79 MMHG | TEMPERATURE: 98.5 F | HEIGHT: 70 IN | WEIGHT: 174.3 LBS | SYSTOLIC BLOOD PRESSURE: 122 MMHG

## 2019-06-03 DIAGNOSIS — E10.9 WELL CONTROLLED TYPE 1 DIABETES MELLITUS (H): Primary | ICD-10-CM

## 2019-06-03 LAB — HBA1C MFR BLD: 6.6 % (ref 4.3–6)

## 2019-06-03 ASSESSMENT — MIFFLIN-ST. JEOR: SCORE: 1676.87

## 2019-06-03 ASSESSMENT — PAIN SCALES - GENERAL: PAINLEVEL: NO PAIN (0)

## 2019-06-03 NOTE — PATIENT INSTRUCTIONS
Blood sugars too high post-meal:   Tighten insulin:carb ratio to 1/18g.  May need to go further to 1/15g if high post-meal readings persist.     Time Novolog 15-20 minutes before eating.     Reduce Lantus to 10 units.  If you start having low sugars under 70, reduce further to 9 units.     A1c 6.6%.

## 2019-06-03 NOTE — PROGRESS NOTES
HPI:   Franklin is a 49 yo man here for follow up of type 1 diabetes, diagnosed 10 years ago.  He also sees Dr. Wilson.  He is currently on Lantus to 11 units daily at 7 AM and Novolog 1 unit/20 grams of CHO with meals and snacks, and for pre-meal correction @ 1/2 U per 30 mg/dL above 150 during the day and over 200 at HS.  He finds that he climbs quite high after a bolus, then comes back down  3 hours later. He wonders if he could take more insulin after eating if he goes into the 300s.       Franklin is wearing a dexcom G6 sensor with overall average of 165 mg/dL this past month.  He really likes having a sensor and feels it helps him manage his diabetes better.  He has never worn a pump.  He does have strong symptoms alerting him to hypoglycemia.  His brother also had type 1 diabetes and  of DKA.    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  He does cardio and weight lifting, basketball, mostly in the mid-day.  He takes an extra snack before exercise.     Franklin also has a history of Hashimoto's ab positivity with normal function, has never been on levothyroxine replacement therapy.      He reports that things have been going well, but he has been feeling a bit achy, chilled and tired for the past couple days.  His wife has had a cold, but no one else has been sick around him.  No cough, cold, chest pain or shortness of breath.     ROS   GENERAL: no weight loss, weight gain, fevers.  +chills, body aches for 2 days.  HEENT: no dysphagia, diplopia, neck pain or tenderness, dry/scratchy eyes, URI, cough, sinus drainage, tinnitus, sinus pressure  CV: no chest pain, pressure, palpitations, skipped beats, LOC  LUNGS: no SOB, FOUNTAIN, cough, sputum production, wheezing   GI: no diarrhea, constipation, abdominal pain  EXTREMITIES: no rashes, ulcers, edema  NEUROLOGY: no changes in vision, tingling or numbness in hands or feet.   MSK: no muscle aches or pains, weakness  PSYCH: mood stable    Past Medical  History:   Diagnosis Date     Chronic lymphocytic thyroiditis 7/20/2011     Chronic lymphocytic thyroiditis 7/20/2011     Chronic lymphocytic thyroiditis      Depressive disorder 06/01/1997    Treated for a couple of years; not currently experiencing     Diabetes 6/23/2009       Past Surgical History:   Procedure Laterality Date     HC TOOTH EXTRACTION W/FORCEP         Family History   Problem Relation Age of Onset     Hyperlipidemia Mother      Diabetes Brother      Hypertension Paternal Grandfather      Cerebrovascular Disease Paternal Grandfather      Hypertension Maternal Grandfather      Hyperlipidemia Maternal Grandfather      Cerebrovascular Disease Maternal Grandfather      Breast Cancer Maternal Aunt      Diabetes Brother      Anxiety Disorder Brother      Coronary Artery Disease Other         Maternal great grandfather     Hypertension Maternal Grandmother      Hyperlipidemia Maternal Grandmother      Hyperlipidemia Brother      Cerebrovascular Disease Paternal Grandmother      Asthma No family hx of      C.A.D. No family hx of      Cancer - colorectal No family hx of      Prostate Cancer No family hx of        Social History     Social History     Marital status:      Spouse name: N/A     Number of children: N/A     Years of education: N/A     Social History Main Topics     Smoking status: Never Smoker     Smokeless tobacco: Never Used     Alcohol use Yes      Comment: ~ 1 drink/day     Drug use: No     Sexual activity: Yes     Partners: Female     Birth control/ protection: Condom, Natural Family Planning      Comment: Would like to talk about vasectomy     Other Topics Concern     Parent/Sibling W/ Cabg, Mi Or Angioplasty Before 65f 55m? No     Social History Narrative   Social Hx:   .  Has a young son.  Works a  at the [employer]. Now . Physically active.    Current Outpatient Medications   Medication     ACE/ARB NOT PRESCRIBED, INTENTIONAL,  "    acetaminophen-codeine (TYLENOL #3) 300-30 MG tablet     atorvastatin (LIPITOR) 10 MG tablet     blood glucose (ESTEVAN BREEZE 2) test strip     blood glucose (NO BRAND SPECIFIED) test strip     blood glucose monitoring (NO BRAND SPECIFIED) meter device kit     Blood Glucose Monitoring Suppl (Vivendy TherapeuticsEZE 2 SYSTEM) W/DEVICE KIT     glucagon (GLUCAGON EMERGENCY) 1 MG kit     Injection Device for insulin (NOVOPEN JR, GREEN,) DUSTIN     insulin glargine (LANTUS SOLOSTAR) 100 UNIT/ML pen     insulin pen needle (B-D U/F) 31G X 5 MM     loratadine (CLARITIN) 10 MG tablet     NOVOLOG PENFILL 100 UNIT/ML soln     Sodium Fluoride (CLINPRO 5000) 1.1 % PSTE     VITAMIN D, CHOLECALCIFEROL, PO     No current facility-administered medications for this visit.           Allergies   Allergen Reactions     Ceftin Rash     Ampicillin Rash       Physical Exam  /79   Pulse 85   Temp 98.5  F (36.9  C) (Oral)   Ht 1.778 m (5' 10\")   Wt 79.1 kg (174 lb 4.8 oz)   BMI 25.01 kg/m    GENERAL:  Alert and oriented X3, NAD, well dressed, answering questions appropriately, appears stated age.  HEENT: OP clear, no lymphadenopathy, no thyromegaly, non-tender, no exophthalmus, no proptosis, EOMI, no lid lag, no retraction  CV: RRR, no murmurs  LUNGS: CTAB, no wheezes, rales, or ronchi  EXTREMITIES: no edema, +pulses, no rashes, no lesions  NEUROLOGY: CN grossly intact, + monofilament, + DTR upper and lower extremity  MSK: grossly intact    RESULTS  Lab Results   Component Value Date    A1C 8.4 (H) 03/13/2017    A1C 7.6 (H) 02/18/2016    A1C 7.8 (H) 07/07/2015    A1C 6.9 (H) 12/03/2013    A1C 6.9 (A) 09/25/2013    HEMOGLOBINA1 7.0 (A) 03/12/2019    HEMOGLOBINA1 7.9 (A) 11/13/2018    HEMOGLOBINA1 7.7 (A) 08/13/2018    HEMOGLOBINA1 7.6 (A) 04/24/2018    HEMOGLOBINA1 7.3 (A) 02/05/2018       TSH   Date Value Ref Range Status   05/24/2019 1.89 0.40 - 4.00 mU/L Final   04/18/2018 2.20 0.40 - 4.00 mU/L Final   03/13/2017 2.81 0.40 - 4.00 mU/L " Final   02/18/2016 3.26 0.40 - 4.00 mU/L Final   07/07/2015 1.70 0.40 - 4.00 mU/L Final     T4 Total   Date Value Ref Range Status   09/28/2010 9.0 5.0 - 11.0 ug/dL Final     T4 Free   Date Value Ref Range Status   02/18/2016 1.06 0.76 - 1.46 ng/dL Final   07/07/2015 1.04 0.76 - 1.46 ng/dL Final   01/05/2015 1.02 0.76 - 1.46 ng/dL Final     Comment:     Effective 7/30/2014, the reference range for this assay has changed to reflect   new instrumentation/methodology.     12/03/2013 1.08 0.70 - 1.85 ng/dL Final   01/26/2011 1.10 0.70 - 1.85 ng/dL Final       ALT   Date Value Ref Range Status   05/24/2019 25 0 - 70 U/L Final   03/13/2017 24 0 - 70 U/L Final   ]    Recent Labs   Lab Test 05/24/19  0938 05/04/18  1016  01/05/15  0933 12/03/13  0907   CHOL 122 121   < > 134 163   HDL 50 50   < > 47 43   LDL 63 66   < > 76 108   TRIG 43 23   < > 57 57   CHOLHDLRATIO  --   --   --  2.9 3.8    < > = values in this interval not displayed.       Lab Results   Component Value Date     03/13/2017      Lab Results   Component Value Date    POTASSIUM 4.3 03/13/2017     Lab Results   Component Value Date    CHLORIDE 102 03/13/2017     Lab Results   Component Value Date    SHANIKA 9.1 03/13/2017     Lab Results   Component Value Date    CO2 31 03/13/2017     Lab Results   Component Value Date    BUN 15 03/13/2017     Lab Results   Component Value Date    CR 0.88 05/24/2019       GFR Estimate   Date Value Ref Range Status   05/24/2019 >90 >60 mL/min/[1.73_m2] Final     Comment:     Non  GFR Calc  Starting 12/18/2018, serum creatinine based estimated GFR (eGFR) will be   calculated using the Chronic Kidney Disease Epidemiology Collaboration   (CKD-EPI) equation.     04/18/2018 >90 >60 mL/min/1.7m2 Final     Comment:     Non  GFR Calc   03/13/2017 >90  Non  GFR Calc   >60 mL/min/1.7m2 Final     GFR Estimate If Black   Date Value Ref Range Status   05/24/2019 >90 >60 mL/min/[1.73_m2]  "Final     Comment:      GFR Calc  Starting 12/18/2018, serum creatinine based estimated GFR (eGFR) will be   calculated using the Chronic Kidney Disease Epidemiology Collaboration   (CKD-EPI) equation.     04/18/2018 >90 >60 mL/min/1.7m2 Final     Comment:      GFR Calc   03/13/2017 >90   GFR Calc   >60 mL/min/1.7m2 Final       Lab Results   Component Value Date    MICROL <5 05/24/2019     No results found for: MICROALBUMIN  Lab Results   Component Value Date    CPEPT 2.3 11/19/2009    GADAB >250.0 (H) 11/19/2009    ISCAB  09/08/2009     1:16  Reference range: <1:4  (Note)  TEST INFORMATION: Islet Cell Ab, IgG  Islet cell antibodies have been associated with  \"autoimmune\" endocrine disorders and insulin-dependent  diabetes. This disorder is characterized by the presence  of antibodies in patients that may be detected years  before the onset of the clinical symptoms. To calculate  Juvenile Diabetes Foundation (JDF) units: multiply the  titer x 5 (1:8  8 x 5 = 40 JDF Units).  Performed by [facility],  [address] [place] [id number] [phone]  [email or web address], Evelia Miles MD, Lab. Director       No results found for: B12]    Most recent eye exam date: : Not Found     Eye exam in January, 2019- no retinopathy      Assessment/Plan:     1.  Type 1 diabetes- Overall, doing well, but he feels he is climbing too high post-meal.  We will try tightening IC ratio to 1/20g, but I suggested he reduce Lantus to 10 units to reduce the risk of fasting hypoglycemia.  He may need to reduce further to 9 units.  A1c is excellent at 6.6%, the lowest it has been. He has benefited greatly from sensor.     2.  Risk factors-     Retinopathy:  No.  Had eye exam within last year.   Nephropathy:  BP well controlled. No microalbuminuria.  Creatinine stable.   Neuropathy: No.    Feet: OK, no ulcers.   Lipids:  LDL at target.  Patient taking statin    3.  Fatigue/ Body aches- exam is " normal and so are vitals. TSH is normal. It is possible he has the start of a virus.  Will monitor symptoms and f/u with PCP if they persist or worsen.       4. F/U in 3 months with Dr. Wilson.     I spent 30 minutes with this patient face to face and explained the conditions and plans (more than 50% of time was counseling/coordination of care, diabetes management, follow up plan for worsening hyper and hypoglycemia) . The patient understood and is satisfied with today's visit.      Liz Conroy PA-C, MPAS   [facility]  Department of Medicine  Division of Endocrinology and Diabetes

## 2019-06-04 DIAGNOSIS — Z30.2 ENCOUNTER FOR VASECTOMY: ICD-10-CM

## 2019-06-04 LAB
ABSTINENCE DAYS: 7 DAYS (ref 2–7)
AGGLUTINATION: NO YES/NO
ANALYSIS TEMP - CENTIGRADE: 23 CENTIGRADE
CELL FRAGMENTS: ABNORMAL %
COLLECTION METHOD: ABNORMAL
COLLECTION SITE: ABNORMAL
CONSENT TO RELEASE TO PARTNER: NO
IMMATURE SPERM: ABNORMAL %
IMMOTILE: 100 %
LAB RECEIPT TIME: ABNORMAL
LIQUEFIED: YES YES/NO
NON-PROGRESSIVE MOTILITY: 0 %
PROGRESSIVE MOTILITY: 0 % (ref 32–?)
ROUND CELLS: 0 MILLION/ML (ref ?–2)
SPECIMEN CONCENTRATION: ABNORMAL MILLION/ML (ref 15–?)
SPECIMEN PH: 7.2 PH (ref 7.2–?)
SPECIMEN TYPE: ABNORMAL
SPECIMEN VOL UR: 4.2 ML (ref 1.5–?)
TIME OF ANALYSIS: ABNORMAL
TOTAL NUMBER: ABNORMAL MILLION (ref 39–?)
TOTAL PROGRESSIVE MOTILE: 0 MILLION (ref 15.6–?)
VISCOUS: NO YES/NO
WBC SPECIMEN: ABNORMAL %

## 2019-06-04 PROCEDURE — 89321 SEMEN ANAL SPERM DETECTION: CPT

## 2019-06-05 ENCOUNTER — TELEPHONE (OUTPATIENT)
Dept: ENDOCRINOLOGY | Facility: CLINIC | Age: 49
End: 2019-06-05

## 2019-06-05 NOTE — RESULT ENCOUNTER NOTE
Dear Franklin     Your post-vasectomy semen analysis shows very rare presence of a few non-motile (dead) sperm.    This sample meets current criteria for success (under 100,000 non-motile sperm).    The chance of pregnancy for men with this result is the same as if zero sperm were seen (about 1 in 2000) so you're OK for unprotected intercourse if you are OK with these risks.    If you want to be extra careful we can have you repeat a semen analysis in a month, to confirm results.  You can call [facility] [phone] to schedule at your convenience.    Thank You  Let me know if you have any questions.    Jennifer BOSE

## 2019-06-05 NOTE — TELEPHONE ENCOUNTER
Forms received from: [facility]     Forms were for insulin treated diabetes mellitus report     Faxed Date:6/18/19

## 2019-06-07 NOTE — MR AVS SNAPSHOT
After Visit Summary   7/21/2017    Franklin Muir    MRN: [id number]           Patient Information     Date Of Birth          1970        Visit Information        Provider Department      7/21/2017 8:20 AM Molina Ayala PA-C [facility]        Today's Diagnoses     Paresthesia of arm    -  1    Testicular pain        Encounter for contraceptive management, unspecified type          Care Instructions    1. Carpal tunnel exercises for home (Youtube)  2. Pelvic floor stretches for home - (Youtube)  3. US of testicle:  [facility] Scheduling Phone Number: [phone]  Or   [facility] Scheduling Phone number: [phone]  4. 1 to 2 weeks of anti-inflammatory - naproxen (aleve) 1 to 2 pills twice daily for a trial           Follow-ups after your visit        Additional Services     UROLOGY ADULT REFERRAL       Your provider has referred you to: [facility]: [facility] [phone]   [email or web address]  [facility]: [place] [facility] C[facility] [phone]   [email or web address]    Please be aware that coverage of these services is subject to the terms and limitations of your health insurance plan.  Call member services at your health plan with any benefit or coverage questions.      Please bring the following with you to your appointment:    (1) Any X-Rays, CTs or MRIs which have been performed.  Contact the facility where they were done to arrange for  prior to your scheduled appointment.    (2) List of current medications  (3) This referral request   (4) Any documents/labs given to you for this referral                  Your next 10 appointments already scheduled     Oct 31, 2017 10:00 AM CDT   (Arrive by 9:45 AM)   RETURN DIABETES with MD CECILY Ovalles  "[facility] ([facility])    [address]  3rd Floor  [facility] [address] [phone]              Future tests that were ordered for you today     Open Future Orders        Priority Expected Expires Ordered    US Testicular and Scrotum Routine  7/21/2018 7/21/2017            Who to contact     If you have questions or need follow up information about today's clinic visit or your schedule please contact [facility] directly at [phone].  Normal or non-critical lab and imaging results will be communicated to you by [employer], letter or phone within 4 business days after the clinic has received the results. If you do not hear from us within 7 days, please contact the clinic through [employer] or phone. If you have a critical or abnormal lab result, we will notify you by phone as soon as possible.  Submit refill requests through [employer] or call your pharmacy and they will forward the refill request to us. Please allow 3 business days for your refill to be completed.          Additional Information About Your Visit        [employer] Information     [employer] gives you secure access to your electronic health record. If you see a primary care provider, you can also send messages to your care team and make appointments. If you have questions, please call your primary care clinic.  If you do not have a primary care provider, please call [phone] and they will assist you.        Care EveryWhere ID     This is your Care EveryWhere ID. This could be used by other organizations to access your [facility] medical records  [id number]        Your Vitals Were     Pulse Temperature Height BMI (Body Mass Index)          68 98.8  F (37.1  C) (Oral) 5' 10.25\" (1.784 m) 24.65 kg/m2         Blood Pressure from Last 3 Encounters:   07/21/17 126/74   07/10/17 123/79   03/21/17 118/79    Weight from Last 3 Encounters:   07/21/17 173 lb (78.5 kg)   07/10/17 175 lb (79.4 kg) "   03/21/17 171 lb 8 oz (77.8 kg)              We Performed the Following     UROLOGY ADULT REFERRAL        Primary Care Provider Office Phone # Fax #    Ruddy Malave [phone]       [facility] [address] [facility] [address] [facility] [address]        Equal Access to Services     GIULIANO MAHMOOD : Hadii aad ku hadasho Soomaali, waaxda luqadaha, qaybta kaalmada adeegyada, waxay idiin hayaan adeeg kharash la'aan . So [facility] [phone].    ATENCIÓN: Si habla español, tiene a pedro disposición servicios gratuitos de asistencia lingüística. Sarahame al [phone].    We comply with applicable federal civil rights laws and [place] laws. We do not discriminate on the basis of race, color, national origin, age, disability sex, sexual orientation or gender identity.            Thank you!     Thank you for choosing [facility]  for your care. Our goal is always to provide you with excellent care. Hearing back from our patients is one way we can continue to improve our services. Please take a few minutes to complete the written survey that you may receive in the mail after your visit with us. Thank you!             Your Updated Medication List - Protect others around you: Learn how to safely use, store and throw away your medicines at www.disposemymeds.org.          This list is accurate as of: 7/21/17  9:08 AM.  Always use your most recent med list.                   Brand Name Dispense Instructions for use Diagnosis    ACE/ARB NOT PRESCRIBED (INTENTIONAL)      by Other route continuous prn.        aspirin 81 MG tablet     100    ONE DAILY        atorvastatin 10 MG tablet    LIPITOR    90 tablet    Take 1 tablet (10 mg) by mouth daily    Mixed hyperlipidemia       blood glucose monitoring meter device kit      Use daily        blood glucose test strip    ESTEVAN BREEZE 2    450 each    Test 5 times daily    Diabetes mellitus type 1 (H)       CLINPRO 5000 1.1 % Pste   Generic  drug:  Sodium Fluoride      Apply  to affected area. Use once every night        glucagon 1 MG kit    GLUCAGON EMERGENCY    2 each    Use as directed    Type 1 diabetes mellitus without complication (H)       Injection Device for insulin Carmen    NOVOPEN JR (GREEN)    1 each    Use as directed with insulin    Diabetes mellitus, type 1       insulin glargine 100 UNIT/ML injection    LANTUS SOLOSTAR    15 mL    Inject Subcutaneous. Inject 11 units daily    Diabetes mellitus type 1 (H)       insulin pen needle 31G X 5 MM    B-D U/F    450 each    Use 5 daily or as directed.    Type 1 diabetes mellitus without complication (H)       loratadine 10 MG tablet    CLARITIN          NovoLOG PENFILL 100 UNIT/ML injection   Generic drug:  insulin aspart     30 mL    Use with Novopen carb counting with meals and using about 20 units per day    Diabetes mellitus type 1 (H)          Controlled with current regime

## 2019-06-19 ENCOUNTER — OFFICE VISIT (OUTPATIENT)
Dept: FAMILY MEDICINE | Facility: CLINIC | Age: 49
End: 2019-06-19
Payer: COMMERCIAL

## 2019-06-19 VITALS
DIASTOLIC BLOOD PRESSURE: 70 MMHG | OXYGEN SATURATION: 96 % | SYSTOLIC BLOOD PRESSURE: 118 MMHG | WEIGHT: 168 LBS | BODY MASS INDEX: 24.11 KG/M2 | TEMPERATURE: 98.5 F | HEART RATE: 79 BPM

## 2019-06-19 DIAGNOSIS — E10.9 TYPE 1 DIABETES MELLITUS WITHOUT COMPLICATION (H): ICD-10-CM

## 2019-06-19 DIAGNOSIS — J20.9 ACUTE BRONCHITIS WITH SYMPTOMS > 10 DAYS: Primary | ICD-10-CM

## 2019-06-19 PROCEDURE — 99214 OFFICE O/P EST MOD 30 MIN: CPT | Performed by: NURSE PRACTITIONER

## 2019-06-19 RX ORDER — PREDNISONE 20 MG/1
40 TABLET ORAL DAILY
Qty: 10 TABLET | Refills: 0 | Status: SHIPPED | OUTPATIENT
Start: 2019-06-19 | End: 2019-06-26 | Stop reason: SINTOL

## 2019-06-19 ASSESSMENT — PAIN SCALES - GENERAL: PAINLEVEL: NO PAIN (0)

## 2019-06-19 NOTE — PATIENT INSTRUCTIONS
Patient Education     Acute Bronchitis  Your healthcare provider has told you that you have acute bronchitis. Bronchitis is infection or inflammation of the bronchial tubes (airways in the lungs). Normally, air moves easily in and out of the airways. Bronchitis narrows the airways, making it harder for air to flow in and out of the lungs. This causes symptoms such as shortness of breath, coughing up yellow or green mucus, and wheezing. Bronchitis can be acute or chronic. Acute means the condition comes on quickly and goes away in a short time, usually within 3 to 10 days. Chronic means a condition lasts a long time and often comes back.    What causes acute bronchitis?  Acute bronchitis almost always starts as a viral respiratory infection, such as a cold or the flu. Certain factors make it more likely for a cold or flu to turn into bronchitis. These include being very young, being elderly, having a heart or lung problem, or having a weak immune system. Cigarette smoking also makes bronchitis more likely.  When bronchitis develops, the airways become swollen. The airways may also become infected with bacteria. This is known as a secondary infection.  Diagnosing acute bronchitis  Your healthcare provider will examine you and ask about your symptoms and health history. You may also have a sputum culture to test the fluid in your lungs. Chest X-rays may be done to look for infection in the lungs.  Treating acute bronchitis  Bronchitis usually clears up as the cold or flu goes away. You can help feel better faster by doing the following:    Take medicine as directed. You may be told to take ibuprofen or other over-the-counter medicines. These help relieve inflammation in your bronchial tubes. Your healthcare provider may prescribe an inhaler to help open up the bronchial tubes. Most of the time, acute bronchitis is caused by a viral infection. Antibiotics are usually not prescribed for viral infections.    Drink  plenty of fluids, such as water, juice, or warm soup. Fluids loosen mucus so that you can cough it up. This helps you breathe more easily. Fluids also prevent dehydration.    Make sure you get plenty of rest.    Do not smoke. Do not allow anyone else to smoke in your home.  Recovery and follow-up  Follow up with your doctor as you are told. You will likely feel better in a week or two. But a dry cough can linger beyond that time. Let your doctor know if you still have symptoms (other than a dry cough) after 2 weeks, or if you re prone to getting bronchial infections. Take steps to protect yourself from future infections. These steps include stopping smoking and avoiding tobacco smoke, washing your hands often, and getting a yearly flu shot.  When to call your healthcare provider  Call the healthcare provider if you have any of the following:    Fever of 100.4 F (38.0 C) or higher, or as advised    Symptoms that get worse, or new symptoms    Trouble breathing    Symptoms that don t start to improve within a week, or within 3 days of taking antibiotics   Date Last Reviewed: 12/1/2016 2000-2018 [employer]. [address] [place] [address], [place] [address]. All rights reserved. This information is not intended as a substitute for professional medical care. Always follow your healthcare professional's instructions.

## 2019-06-19 NOTE — PROGRESS NOTES
Subjective     Franklin Muir is a 49 year old male who presents to clinic today for the following health issues:    HPI   Acute Illness   Acute illness concerns:    Onset: 17 days    Fever: YES- low grade at the begining    Chills/Sweats: YES    Headache (location?): no     Sinus Pressure:YES    Conjunctivitis:  no    Ear Pain: no    Rhinorrhea: yes   Congestion: YES    Sore Throat: no      Cough: YES    Wheeze: no     Decreased Appetite: YES    Nausea: no     Vomiting: no     Diarrhea:  no     Dysuria/Freq.: no     Fatigue/Achiness: YES    Sick/Strep Exposure: YES- Wife and son are both sick      Therapies Tried and outcome: Mucinex, niquil    Low energy, chills, aches, chest congestion and productive cough.  Now nasal discharge.  Mucinex suppresses awhile.  Coughing a lot.        Patient Active Problem List   Diagnosis     CARDIOVASCULAR SCREENING; LDL GOAL LESS THAN 100     Diabetes mellitus type 1 (H)     Chronic lymphocytic thyroiditis     Type 1 diabetes mellitus with hyperglycemia (H)     Diabetes mellitus type 1, uncomplicated (H)     Type 1 diabetes mellitus with hypoglycemia and without coma (HCC)     Advance Care Planning     Past Surgical History:   Procedure Laterality Date     HC TOOTH EXTRACTION W/FORCEP       VASECTOMY  01/2019       Social History     Tobacco Use     Smoking status: Never Smoker     Smokeless tobacco: Never Used   Substance Use Topics     Alcohol use: Yes     Comment: ~ 1 drink/day     Family History   Problem Relation Age of Onset     Hyperlipidemia Mother      Diabetes Brother      Hypertension Paternal Grandfather      Cerebrovascular Disease Paternal Grandfather      Hypertension Maternal Grandfather      Hyperlipidemia Maternal Grandfather      Cerebrovascular Disease Maternal Grandfather      Breast Cancer Maternal Aunt      Diabetes Brother      Anxiety Disorder Brother      Coronary Artery Disease Other         Maternal great grandfather     Hypertension Maternal  Grandmother      Hyperlipidemia Maternal Grandmother      Hyperlipidemia Brother      Cerebrovascular Disease Paternal Grandmother      Asthma No family hx of      C.A.D. No family hx of      Cancer - colorectal No family hx of      Prostate Cancer No family hx of          Current Outpatient Medications   Medication Sig Dispense Refill     acetaminophen-codeine (TYLENOL #3) 300-30 MG tablet Take 1-2 tablets by mouth every 6 hours as needed for severe pain 20 tablet 0     atorvastatin (LIPITOR) 10 MG tablet Take 1 tablet (10 mg) by mouth daily 90 tablet 3     blood glucose (ESTEVAN BREEZE 2) test strip Test 5 times daily 450 each 3     blood glucose (NO BRAND SPECIFIED) test strip Use to test blood sugar 1 times daily or as directed. 100 strip 3     blood glucose monitoring (NO BRAND SPECIFIED) meter device kit Use to test blood sugar 1 times daily or as directed. 1 kit 1     Blood Glucose Monitoring Suppl (ETC EducationEZE 2 SYSTEM) W/DEVICE KIT Use daily       Injection Device for insulin (NOVOPEN JR, GREEN,) DUSTIN Use as directed with insulin 1 each 1     insulin glargine (LANTUS SOLOSTAR) 100 UNIT/ML pen Inject Subcutaneous. Inject 9 units daily (Patient taking differently: 10 Units Inject Subcutaneous. Inject 9 units daily) 15 mL 3     insulin pen needle (B-D U/F) 31G X 5 MM Use 5 daily or as directed. 500 each 3            Sodium Fluoride (CLINPRO 5000) 1.1 % PSTE Apply  to affected area. Use once every night       ACE/ARB NOT PRESCRIBED, INTENTIONAL, by Other route continuous prn.       glucagon (GLUCAGON EMERGENCY) 1 MG kit To treat very low blood sugar in an emergency Use as directed (Patient not taking: Reported on 6/3/2019) 2 each 2     loratadine (CLARITIN) 10 MG tablet        NOVOLOG PENFILL 100 UNIT/ML soln 1 unit:15  grams of CHO with meals and snacks, and for pre-meal correction, approx 40 units daily 45 mL 3     VITAMIN D, CHOLECALCIFEROL, PO Take 1,000 Units by mouth daily       Allergies   Allergen  Reactions     Ceftin Rash     Ampicillin Rash     BP Readings from Last 3 Encounters:   06/19/19 118/70   06/03/19 122/79   03/12/19 116/76    Wt Readings from Last 3 Encounters:   06/19/19 76.2 kg (168 lb)   06/03/19 79.1 kg (174 lb 4.8 oz)   03/12/19 79.4 kg (175 lb 1.6 oz)                    Reviewed and updated as needed this visit by Provider  Tobacco  Allergies  Meds  Problems  Med Hx  Surg Hx  Fam Hx         Review of Systems   ROS COMP: Constitutional, HEENT, cardiovascular, pulmonary, gi and gu systems are negative, except as otherwise noted.      Objective    /70 (BP Location: Right arm, Patient Position: Sitting, Cuff Size: Adult Regular)   Pulse 79   Temp 98.5  F (36.9  C) (Oral)   Wt 76.2 kg (168 lb)   SpO2 96%   BMI 24.11 kg/m    Body mass index is 24.11 kg/m .  Physical Exam   GENERAL: healthy, alert and no distress  EYES: Eyes grossly normal to inspection, PERRL and conjunctivae and sclerae normal  HENT: ear canals and TM's normal, nose and mouth without ulcers or lesions  NECK: no adenopathy and no asymmetry, masses, or scars  RESP: lungs clear to auscultation - no rales, rhonchi or wheezes.  Intermittent dry cough.  CV: regular rate and rhythm, normal S1 S2, no S3 or S4, no murmur, click or rub  PSYCH: mentation appears normal, affect normal/bright            Assessment & Plan     1. Acute bronchitis with symptoms > 10 days    - predniSONE (DELTASONE) 20 MG tablet; Take 2 tablets (40 mg) by mouth daily for 5 days  Dispense: 10 tablet; Refill: 0    2. Type 1 diabetes mellitus without complication (H)    Discussed with patient the indication and use of medication(s), risks/benefits, and potential adverse side effects.  Patient/guardian verbalized understanding and agreement with the plan.  Discussed that Prednisone can cause blood sugars to increase and patient will closely monitor his blood sugars.  If patient is not feeling better next week, I advised him to call and I would send  "him an antibiotic.  Patient is in agreement with plan.       BMI:   Estimated body mass index is 24.11 kg/m  as calculated from the following:    Height as of 6/3/19: 1.778 m (5' 10\").    Weight as of this encounter: 76.2 kg (168 lb).               Glenis Lundberg NP  [facility]    "

## 2019-06-21 DIAGNOSIS — E10.8 TYPE 1 DIABETES MELLITUS WITH COMPLICATION (H): Primary | ICD-10-CM

## 2019-06-21 RX ORDER — INSULIN ASPART 100 [IU]/ML
INJECTION, SOLUTION INTRAVENOUS; SUBCUTANEOUS
Qty: 45 ML | Refills: 3 | Status: SHIPPED | OUTPATIENT
Start: 2019-06-21 | End: 2020-07-29

## 2019-06-25 ENCOUNTER — MYC MEDICAL ADVICE (OUTPATIENT)
Dept: FAMILY MEDICINE | Facility: CLINIC | Age: 49
End: 2019-06-25

## 2019-06-25 DIAGNOSIS — J20.9 ACUTE BRONCHITIS WITH SYMPTOMS > 10 DAYS: Primary | ICD-10-CM

## 2019-06-25 NOTE — TELEPHONE ENCOUNTER
Routing [employer] message to Glenis Lundberg, DNP, APRN, CNP.   He is requesting an antibiotic for bronchitis.    Jamaal Arrington RN            Per OV note 6/19 by Glenis:       1. Acute bronchitis with symptoms > 10 days     - predniSONE (DELTASONE) 20 MG tablet; Take 2 tablets (40 mg) by mouth daily for 5 days  Dispense: 10 tablet; Refill: 0     2. Type 1 diabetes mellitus without complication (H)     Discussed with patient the indication and use of medication(s), risks/benefits, and potential adverse side effects.  Patient/guardian verbalized understanding and agreement with the plan.  Discussed that Prednisone can cause blood sugars to increase and patient will closely monitor his blood sugars.  If patient is not feeling better next week, I advised him to call and I would send him an antibiotic.  Patient is in agreement with plan.

## 2019-06-26 RX ORDER — DOXYCYCLINE 100 MG/1
100 CAPSULE ORAL 2 TIMES DAILY
Qty: 10 CAPSULE | Refills: 0 | Status: SHIPPED | OUTPATIENT
Start: 2019-06-26 | End: 2019-12-16

## 2019-06-26 NOTE — TELEPHONE ENCOUNTER
MyChart sent notifying patient of recommendations and will close encounter at this time.    Jena Wilson RN

## 2019-06-26 NOTE — TELEPHONE ENCOUNTER
I sent over a prescription for Doxycycline for this patient, as based on the length of time he has been ill, it likely could be a bacterial infection.    Please let patient know.  He should wear sunblock/cover skin when taking, as he can be at risk for sunburning while on the antibiotic.    Glenis Lundberg, DNP, APRN, CNP

## 2019-07-02 DIAGNOSIS — E10.9 DIABETES MELLITUS TYPE 1 (H): ICD-10-CM

## 2019-07-02 DIAGNOSIS — E10.9 TYPE 1 DIABETES MELLITUS WITHOUT COMPLICATION (H): ICD-10-CM

## 2019-07-30 ENCOUNTER — TELEPHONE (OUTPATIENT)
Dept: ENDOCRINOLOGY | Facility: CLINIC | Age: 49
End: 2019-07-30

## 2019-07-30 DIAGNOSIS — E16.2 HYPOGLYCEMIA: Primary | ICD-10-CM

## 2019-07-31 NOTE — TELEPHONE ENCOUNTER
I called Franklin earlier today and left a voice message regarding his blood glucose data.  I called him again at 7:30 PM today and this time I could speak directly with him.  Franklin was experiencing postprandial hypoglycemia as well as overnight hypoglycemia.  The postprandial hypoglycemia has resolved with the change he made on his insulin to carbohydrate ratio.  Overnight hypoglycemia persists.  He is currently taking 9 units of Lantus (decreased from 10 units) around 8 PM, and he is experiencing hypoglycemia between 2 and 4 AM.  We discussed that it is likely related to the fact that at this low dose Lantus insulin behaves like NPH insulin, thus having a peak about 6 hours after the injection (and perhaps lasting less than 24 hours).  I suggested we move his Lantus to the morning.  He will further adjust carb coverage if needed.  If it is remains a problem, we may consider using a longer acting insulin (Toujeo) or an insulin pump. I also mentioned to him I will be away for the next 12 to 14 days and he will contact our nurse/doctor on call if he continues to experience any hypoglycemia or has any questions.  I also shared with his data with the my colleague Dr. Rosi Ochoa who is on call this week.    Freya Wilson MD PhD    Division of Endocrinology and Diabetes

## 2019-09-10 ENCOUNTER — OFFICE VISIT (OUTPATIENT)
Dept: ENDOCRINOLOGY | Facility: CLINIC | Age: 49
End: 2019-09-10
Payer: COMMERCIAL

## 2019-09-10 VITALS
SYSTOLIC BLOOD PRESSURE: 111 MMHG | DIASTOLIC BLOOD PRESSURE: 76 MMHG | WEIGHT: 177.1 LBS | HEART RATE: 69 BPM | BODY MASS INDEX: 25.41 KG/M2

## 2019-09-10 DIAGNOSIS — E10.9 TYPE 1 DIABETES MELLITUS WITHOUT COMPLICATION (H): Primary | ICD-10-CM

## 2019-09-10 LAB — HBA1C MFR BLD: 6.8 % (ref 4.3–6)

## 2019-09-10 ASSESSMENT — PAIN SCALES - GENERAL: PAINLEVEL: NO PAIN (0)

## 2019-09-10 NOTE — LETTER
9/10/2019       RE: Franklin Muir  [address]  [place] [address]     Dear Colleague,    Thank you for referring your patient, Franklin Muir, to the [facility] at [facility]. Please see a copy of my visit note below.         SUBJECTIVE:  Franklin Muir is a 49 year old male with a PMHx of type 1 DM, diagnosed 10 years ago here for follow up on type 1 DM.    He is on Lantus 10 U daily at 7 am decreased from 11 U at previous visit. Novolog 1 U /20 g of CHO with meals and snacks and premeals correction @ 1/2 U per 30 mg/Ll above 150 mg/dL during the day and over 200 mg/dL HS.     He feels things are going well for him now except for few episodes of hypoglycemia mostly related to exercise and in the afternoon after he gives himself the Novolog with meals. During the episodes of hypoglycemia patient is symptomatic.     He denies any numbness, tingling, or vision changes.     He is wearing Dexcom G6 sensor with overall average of 168 mg/dl over the last month. He is been 63.7% in target range. 0.4% low which patient feels. A1C today 6.8%.     Franklin also has a history of Hashimoto's Ab positivity with normal function, has never been on levothyroxine replacement therapy.      Diabetes Care:  Retinopathy:  No.  Had eye exam within last year 1/2019.   Nephropathy:  BP well controlled. No microalbuminuria.  Creatinine stable.   Neuropathy: No.    Feet: OK, no ulcers.   Lipids:  LDL at target.  Patient taking statin      Medications and allergies reviewed by me today.     ROS:   11 point ROS, including but not limited to Constitutional, HEENT, cardiovascular, pulmonary, gi and gu systems are negative, except as otherwise noted.    OBJECTIVE:    /76   Pulse 69   Wt 80.3 kg (177 lb 1.6 oz)   BMI 25.41 kg/m      Wt Readings from Last 1 Encounters:   09/10/19 80.3 kg (177 lb 1.6 oz)     General: NAD, pleasant and conversant   Skin: no lesions on visible areas  of skin  HEENT: EOMI, MMM  Neck: no palpable LN  Thyroid: No goiter or nodules  Resp: CTAB  CV: RRR  Extremities: warm and no edema  Psych: Appropriate mood and affect     ASSESSMENT/PLAN:    Franklin has a 10 years history of type 1 DM and he was seen today for recheck.    Diagnoses and all orders for this visit:    Type 1 diabetes mellitus without complication (H)  Very well controlled. Aside from few episodes of hypoglycemia post prandial and excersise induced.   Recommended taking the Novolog earlier around 20 - 25 min before eating and not overcompensate for CHO intake.  Also recommended eating a small snack before exercising ~12-15 g of carb and not correcting for it. If hypoglycemia dont improve will consider switching long acting insulin to Tresiba.         -     Hemoglobin A1c POCT  -     Glucagon 3 MG/DOSE POWD; Spray 3 mg in nostril as needed (Hypoglycemia) 3 mg (one actuation) into a single nostril; if no response, may repeat in 15 minutes using a new intranasal device.      Diabetes Care:  Retinopathy:  No.  Had eye exam within last year 1/2019.  Hypothyroidism: TSH wnl  5/2019  Nephropathy:  BP well controlled. No microalbuminuria.  Creatinine stable.   Neuropathy: No.    Feet: OK, no ulcers.   Lipids:  LDL at target.  Patient taking statin    Orders Placed This Encounter   Procedures     Hemoglobin A1c POCT       Pt should return to clinic for f/u with Dr. Wilson or Liz Conroy in 3 months.       Wiliam Palmer MD  Sep 10, 2019    Pt was seen and plan of care discussed with Dr. Wilson.     Physician Attestation   I, YESSENIA Wilson MD, saw this patient and agree with the findings and plan of care as documented in the note.      YESSENIA Wilson MD

## 2019-09-10 NOTE — PROGRESS NOTES
SUBJECTIVE:  Franklin Muir is a 49 year old male with a PMHx of type 1 DM, diagnosed 10 years ago here for follow up on type 1 DM.    He is on Lantus 10 U daily at 7 am decreased from 11 U at previous visit. Novolog 1 U /20 g of CHO with meals and snacks and premeals correction @ 1/2 U per 30 mg/Ll above 150 mg/dL during the day and over 200 mg/dL HS.     He feels things are going well for him now except for few episodes of hypoglycemia mostly related to exercise and in the afternoon after he gives himself the Novolog with meals. During the episodes of hypoglycemia patient is symptomatic.     He denies any numbness, tingling, or vision changes.     He is wearing Dexcom G6 sensor with overall average of 168 mg/dl over the last month. He is been 63.7% in target range. 0.4% low which patient feels. A1C today 6.8%.     Franklin also has a history of Hashimoto's Ab positivity with normal function, has never been on levothyroxine replacement therapy.      Diabetes Care:  Retinopathy:  No.  Had eye exam within last year 1/2019.   Nephropathy:  BP well controlled. No microalbuminuria.  Creatinine stable.   Neuropathy: No.    Feet: OK, no ulcers.   Lipids:  LDL at target.  Patient taking statin      Medications and allergies reviewed by me today.     ROS:   11 point ROS, including but not limited to Constitutional, HEENT, cardiovascular, pulmonary, gi and gu systems are negative, except as otherwise noted.    OBJECTIVE:    /76   Pulse 69   Wt 80.3 kg (177 lb 1.6 oz)   BMI 25.41 kg/m     Wt Readings from Last 1 Encounters:   09/10/19 80.3 kg (177 lb 1.6 oz)     General: NAD, pleasant and conversant   Skin: no lesions on visible areas of skin  HEENT: EOMI, MMM  Neck: no palpable LN  Thyroid: No goiter or nodules  Resp: CTAB  CV: RRR  Extremities: warm and no edema  Psych: Appropriate mood and affect     ASSESSMENT/PLAN:    Franklin has a 10 years history of type 1 DM and he was seen today for  recheck.    Diagnoses and all orders for this visit:    Type 1 diabetes mellitus without complication (H)  Very well controlled. Aside from few episodes of hypoglycemia post prandial and excersise induced.   Recommended taking the Novolog earlier around 20 - 25 min before eating and not overcompensate for CHO intake.  Also recommended eating a small snack before exercising ~12-15 g of carb and not correcting for it. If hypoglycemia dont improve will consider switching long acting insulin to Tresiba.         -     Hemoglobin A1c POCT  -     Glucagon 3 MG/DOSE POWD; Spray 3 mg in nostril as needed (Hypoglycemia) 3 mg (one actuation) into a single nostril; if no response, may repeat in 15 minutes using a new intranasal device.      Diabetes Care:  Retinopathy:  No.  Had eye exam within last year 1/2019.  Hypothyroidism: TSH wnl  5/2019  Nephropathy:  BP well controlled. No microalbuminuria.  Creatinine stable.   Neuropathy: No.    Feet: OK, no ulcers.   Lipids:  LDL at target.  Patient taking statin    Orders Placed This Encounter   Procedures     Hemoglobin A1c POCT       Pt should return to clinic for f/u with Dr. Wilson or Liz Conroy in 3 months.       Wiliam Palmer MD  Sep 10, 2019    Pt was seen and plan of care discussed with Dr. Wilson.     Physician Attestation   I, YESSENIA Wilson MD, saw this patient and agree with the findings and plan of care as documented in the note.      YESSENIA Wilson MD

## 2019-09-10 NOTE — NURSING NOTE
[facility] Call Center    Phone Message    May a detailed message be left on voicemail: yes    Reason for Call: Medication Question or concern regarding medication   Prescription Clarification  Name of Medication: Glucagon 3 MG/DOSE POWD  Prescribing Provider: ?   Pharmacy: [facility]   What on the order needs clarification? Please call the pharmacy to confirm what signature is on the script, They are unable to read it.           Action Taken: Message routed to:  [facility] ([facility] Endo

## 2019-09-23 DIAGNOSIS — E10.9 TYPE 1 DIABETES MELLITUS WITHOUT COMPLICATION (H): ICD-10-CM

## 2019-10-22 DIAGNOSIS — E10.9 DIABETES MELLITUS TYPE 1 (H): ICD-10-CM

## 2019-11-06 ENCOUNTER — HEALTH MAINTENANCE LETTER (OUTPATIENT)
Age: 49
End: 2019-11-06

## 2019-12-03 ENCOUNTER — TRANSFERRED RECORDS (OUTPATIENT)
Dept: HEALTH INFORMATION MANAGEMENT | Facility: CLINIC | Age: 49
End: 2019-12-03

## 2019-12-16 ENCOUNTER — OFFICE VISIT (OUTPATIENT)
Dept: ENDOCRINOLOGY | Facility: CLINIC | Age: 49
End: 2019-12-16
Payer: COMMERCIAL

## 2019-12-16 VITALS
BODY MASS INDEX: 25.2 KG/M2 | WEIGHT: 176 LBS | SYSTOLIC BLOOD PRESSURE: 113 MMHG | HEART RATE: 75 BPM | HEIGHT: 70 IN | DIASTOLIC BLOOD PRESSURE: 74 MMHG

## 2019-12-16 DIAGNOSIS — E10.9 WELL CONTROLLED TYPE 1 DIABETES MELLITUS (H): ICD-10-CM

## 2019-12-16 DIAGNOSIS — E10.65 TYPE 1 DIABETES MELLITUS WITH HYPERGLYCEMIA (H): Primary | ICD-10-CM

## 2019-12-16 RX ORDER — PROCHLORPERAZINE 25 MG/1
1 SUPPOSITORY RECTAL
Qty: 10 EACH | Refills: 3 | Status: SHIPPED | OUTPATIENT
Start: 2019-12-16 | End: 2021-02-08

## 2019-12-16 RX ORDER — KETOCONAZOLE 20 MG/ML
SHAMPOO TOPICAL
Refills: 10 | COMMUNITY
Start: 2019-12-03 | End: 2022-06-02

## 2019-12-16 RX ORDER — PROCHLORPERAZINE 25 MG/1
1 SUPPOSITORY RECTAL
Qty: 1 EACH | Refills: 3 | Status: SHIPPED | OUTPATIENT
Start: 2019-12-16 | End: 2021-02-08

## 2019-12-16 ASSESSMENT — MIFFLIN-ST. JEOR: SCORE: 1679.58

## 2019-12-16 ASSESSMENT — PAIN SCALES - GENERAL: PAINLEVEL: NO PAIN (0)

## 2019-12-16 NOTE — LETTER
2019       RE: Franklin Muir  [address]  [place] [address]     Dear Colleague,    Thank you for referring your patient, Franklin Muir, to the [facility] ENDOCRINOLOGY at [facility]. Please see a copy of my visit note below.    HPI:   Franklin is a 47 yo man here for follow up of type 1 diabetes, diagnosed 10 years ago.  He also sees Dr. Wilson.  He is currently on Lantus 10 units daily at 7 AM and Novolog 1 unit/20 grams of CHO with meals and snacks, and for pre-meal correction @ 1/2 U per 30 mg/dL above 150 during the day and over 200 at HS. He finds that he climbs quite high after a bolus, then comes back down  3 hours later. He wonders if he could take more insulin after eating if he goes into the 300s.     Franklin wears a Dexcom G6 sensor with overall average of 175 mg/dL for the past two weeks.    49% in target ( mg/dL)  51% above target (>180 mg/dL)  0% low (<70 mg/dL)  0% very low (<54 mg/dL)    He really likes having a sensor and feels it helps him manage his diabetes better.  He has never worn a pump.  He does have strong symptoms alerting him to hypoglycemia.  His brother also had type 1 diabetes and  of DKA.    He is very physically active, especially with his young son, although less so in the winter.  He does cardio and weight lifting, basketball, mostly in the mid-day.  He takes an extra snack before exercise.     Franklin also has a history of Hashimoto's ab positivity with normal function, has never been on levothyroxine replacement therapy.      He generally has been feeling well and has not concerns today.     ROS   GENERAL: no weight loss, weight gain, fevers.     HEENT: no dysphagia, diplopia, neck pain or tenderness, dry/scratchy eyes, URI, cough, sinus drainage, tinnitus, sinus pressure  CV: no chest pain, pressure, palpitations, skipped beats, LOC  LUNGS: no SOB, FOUNTAIN, cough, sputum production, wheezing   GI: no diarrhea,  constipation, abdominal pain  EXTREMITIES: no rashes, ulcers, edema  NEUROLOGY: no changes in vision, tingling or numbness in hands or feet.   MSK: no muscle aches or pains, weakness  PSYCH: mood stable    Past Medical History:   Diagnosis Date     Chronic lymphocytic thyroiditis 7/20/2011     Chronic lymphocytic thyroiditis 7/20/2011     Chronic lymphocytic thyroiditis      Depressive disorder 06/01/1997    Treated for a couple of years; not currently experiencing     Diabetes 6/23/2009       Past Surgical History:   Procedure Laterality Date     HC TOOTH EXTRACTION W/FORCEP       VASECTOMY  01/2019       Family History   Problem Relation Age of Onset     Hyperlipidemia Mother      Diabetes Brother      Hypertension Paternal Grandfather      Cerebrovascular Disease Paternal Grandfather      Hypertension Maternal Grandfather      Hyperlipidemia Maternal Grandfather      Cerebrovascular Disease Maternal Grandfather      Breast Cancer Maternal Aunt      Diabetes Brother      Anxiety Disorder Brother      Coronary Artery Disease Other         Maternal great grandfather     Hypertension Maternal Grandmother      Hyperlipidemia Maternal Grandmother      Hyperlipidemia Brother      Cerebrovascular Disease Paternal Grandmother      Asthma No family hx of      C.A.D. No family hx of      Cancer - colorectal No family hx of      Prostate Cancer No family hx of        Social History     Social History     Marital status:      Spouse name: N/A     Number of children: N/A     Years of education: N/A     Social History Main Topics     Smoking status: Never Smoker     Smokeless tobacco: Never Used     Alcohol use Yes      Comment: ~ 1 drink/day     Drug use: No     Sexual activity: Yes     Partners: Female     Birth control/ protection: Condom, Natural Family Planning      Comment: Would like to talk about vasectomy     Other Topics Concern     Parent/Sibling W/ Cabg, Mi Or Angioplasty Before 65f 55m? No     Social History  "Narrative   Social Hx:   .  Has a son born in 2011.  Works a  at the [employer]. Now . Physically active- running in the nice weather and playing soccer with his son.    Current Outpatient Medications   Medication     ACE/ARB NOT PRESCRIBED, INTENTIONAL,     acetaminophen-codeine (TYLENOL #3) 300-30 MG tablet     atorvastatin (LIPITOR) 10 MG tablet     blood glucose (ESTEVAN BREEZE 2) test strip     blood glucose (NO BRAND SPECIFIED) test strip     blood glucose monitoring (NO BRAND SPECIFIED) meter device kit     Blood Glucose Monitoring Suppl ([employer] 2 SYSTEM) W/DEVICE KIT     glucagon (GLUCAGON EMERGENCY) 1 MG kit     Glucagon 3 MG/DOSE POWD     Injection Device for insulin (NOVOPEN JR, GREEN,) DUSTIN     insulin glargine (LANTUS SOLOSTAR) 100 UNIT/ML pen     insulin pen needle (B-D U/F) 31G X 5 MM miscellaneous     loratadine (CLARITIN) 10 MG tablet     NOVOLOG PENFILL 100 UNIT/ML soln     Sodium Fluoride (CLINPRO 5000) 1.1 % PSTE     VITAMIN D, CHOLECALCIFEROL, PO     No current facility-administered medications for this visit.           Allergies   Allergen Reactions     Ceftin Rash     Seasonal Allergies      Ampicillin Rash       Physical Exam  /74   Pulse 75   Ht 1.778 m (5' 10\")   Wt 79.8 kg (176 lb)   BMI 25.25 kg/m     GENERAL:  Alert and oriented X3, NAD, well dressed, answering questions appropriately, appears stated age.  EXTREMITIES: no edema, +pulses, no rashes, no lesions.      RESULTS  Lab Results   Component Value Date    A1C 8.4 (H) 03/13/2017    A1C 7.6 (H) 02/18/2016    A1C 7.8 (H) 07/07/2015    A1C 6.9 (H) 12/03/2013    A1C 6.9 (A) 09/25/2013    HEMOGLOBINA1 6.8 (A) 09/10/2019    HEMOGLOBINA1 6.6 (A) 06/03/2019    HEMOGLOBINA1 7.0 (A) 03/12/2019    HEMOGLOBINA1 7.9 (A) 11/13/2018    HEMOGLOBINA1 7.7 (A) 08/13/2018       TSH   Date Value Ref Range Status   05/24/2019 1.89 0.40 - 4.00 mU/L Final   04/18/2018 2.20 0.40 - 4.00 " mU/L Final   03/13/2017 2.81 0.40 - 4.00 mU/L Final   02/18/2016 3.26 0.40 - 4.00 mU/L Final   07/07/2015 1.70 0.40 - 4.00 mU/L Final     T4 Total   Date Value Ref Range Status   09/28/2010 9.0 5.0 - 11.0 ug/dL Final     T4 Free   Date Value Ref Range Status   02/18/2016 1.06 0.76 - 1.46 ng/dL Final   07/07/2015 1.04 0.76 - 1.46 ng/dL Final   01/05/2015 1.02 0.76 - 1.46 ng/dL Final     Comment:     Effective 7/30/2014, the reference range for this assay has changed to reflect   new instrumentation/methodology.     12/03/2013 1.08 0.70 - 1.85 ng/dL Final   01/26/2011 1.10 0.70 - 1.85 ng/dL Final       ALT   Date Value Ref Range Status   05/24/2019 25 0 - 70 U/L Final   03/13/2017 24 0 - 70 U/L Final   ]    Recent Labs   Lab Test 05/24/19  0938 05/04/18  1016  01/05/15  0933 12/03/13  0907   CHOL 122 121   < > 134 163   HDL 50 50   < > 47 43   LDL 63 66   < > 76 108   TRIG 43 23   < > 57 57   CHOLHDLRATIO  --   --   --  2.9 3.8    < > = values in this interval not displayed.       Lab Results   Component Value Date     03/13/2017      Lab Results   Component Value Date    POTASSIUM 4.3 03/13/2017     Lab Results   Component Value Date    CHLORIDE 102 03/13/2017     Lab Results   Component Value Date    SHANIKA 9.1 03/13/2017     Lab Results   Component Value Date    CO2 31 03/13/2017     Lab Results   Component Value Date    BUN 15 03/13/2017     Lab Results   Component Value Date    CR 0.88 05/24/2019       GFR Estimate   Date Value Ref Range Status   05/24/2019 >90 >60 mL/min/[1.73_m2] Final     Comment:     Non  GFR Calc  Starting 12/18/2018, serum creatinine based estimated GFR (eGFR) will be   calculated using the Chronic Kidney Disease Epidemiology Collaboration   (CKD-EPI) equation.     04/18/2018 >90 >60 mL/min/1.7m2 Final     Comment:     Non  GFR Calc   03/13/2017 >90  Non  GFR Calc   >60 mL/min/1.7m2 Final     GFR Estimate If Black   Date Value Ref Range  "Status   05/24/2019 >90 >60 mL/min/[1.73_m2] Final     Comment:      GFR Calc  Starting 12/18/2018, serum creatinine based estimated GFR (eGFR) will be   calculated using the Chronic Kidney Disease Epidemiology Collaboration   (CKD-EPI) equation.     04/18/2018 >90 >60 mL/min/1.7m2 Final     Comment:      GFR Calc   03/13/2017 >90   GFR Calc   >60 mL/min/1.7m2 Final       Lab Results   Component Value Date    MICROL <5 05/24/2019     No results found for: MICROALBUMIN  Lab Results   Component Value Date    CPEPT 2.3 11/19/2009    GADAB >250.0 (H) 11/19/2009    ISCAB  09/08/2009     1:16  Reference range: <1:4  (Note)  TEST INFORMATION: Islet Cell Ab, IgG  Islet cell antibodies have been associated with  \"autoimmune\" endocrine disorders and insulin-dependent  diabetes. This disorder is characterized by the presence  of antibodies in patients that may be detected years  before the onset of the clinical symptoms. To calculate  Juvenile Diabetes Foundation (JDF) units: multiply the  titer x 5 (1:8  8 x 5 = 40 JDF Units).  Performed by [facility],  [address] [place] [id number] [phone]  [email or web address], Evelia Miles MD, Lab. Director       No results found for: B12]    Most recent eye exam date: : Not Found     Eye exam in January, 2019- no retinopathy    Assessment/Plan:     1.  Type 1 diabetes- Overall, doing well, but he is climbing post-breakfast most often.  A1c is stable at 7.0% today.  He will work on timing his Novolog 15 minutes before breakfast to prevent the spike.  If he continues to climb, may tighten IC ratio at breakfast to 1/18g.  Could consider using the In-pen for dosing calculations and integration with Dexcom in the future.      2.  Risk factors-     Retinopathy:  No.  Had eye exam within last year. Will schedule.   Nephropathy:  BP well controlled. No microalbuminuria.  Creatinine stable.   Neuropathy: No.    Feet: OK, no ulcers. "   Lipids:  LDL at target.  Patient taking statin    3.  F/U in 3 months with Dr. Wilson.     I spent 30 minutes with this patient face to face and explained the conditions and plans (more than 50% of time was counseling/coordination of care, diabetes management, follow up plan for worsening hyper and hypoglycemia) . The patient understood and is satisfied with today's visit.      Liz Conroy PA-C, MPAS   [facility]  Department of Medicine  Division of Endocrinology and Diabetes

## 2019-12-16 NOTE — NURSING NOTE
Chief Complaint   Patient presents with     RECHECK     Type 1 Diabetes     Capillary puncture performed for Hemoglobin A1C test. Patient tolerated well.    Sandra Valadez MA

## 2019-12-16 NOTE — PROGRESS NOTES
HPI:   Franklin is a 47 yo man here for follow up of type 1 diabetes, diagnosed 10 years ago.  He also sees Dr. Wilson.  He is currently on Lantus 10 units daily at 7 AM and Novolog 1 unit/20 grams of CHO with meals and snacks, and for pre-meal correction @ 1/2 U per 30 mg/dL above 150 during the day and over 200 at HS. He finds that he climbs quite high after a bolus, then comes back down  3 hours later. He wonders if he could take more insulin after eating if he goes into the 300s.     Franklin wears a Dexcom G6 sensor with overall average of 175 mg/dL for the past two weeks.    49% in target ( mg/dL)  51% above target (>180 mg/dL)  0% low (<70 mg/dL)  0% very low (<54 mg/dL)    He really likes having a sensor and feels it helps him manage his diabetes better.  He has never worn a pump.  He does have strong symptoms alerting him to hypoglycemia.  His brother also had type 1 diabetes and  of DKA.    He is very physically active, especially with his young son, although less so in the winter.  He does cardio and weight lifting, basketball, mostly in the mid-day.  He takes an extra snack before exercise.     Franklin also has a history of Hashimoto's ab positivity with normal function, has never been on levothyroxine replacement therapy.      He generally has been feeling well and has not concerns today.     ROS   GENERAL: no weight loss, weight gain, fevers.     HEENT: no dysphagia, diplopia, neck pain or tenderness, dry/scratchy eyes, URI, cough, sinus drainage, tinnitus, sinus pressure  CV: no chest pain, pressure, palpitations, skipped beats, LOC  LUNGS: no SOB, FOUNTAIN, cough, sputum production, wheezing   GI: no diarrhea, constipation, abdominal pain  EXTREMITIES: no rashes, ulcers, edema  NEUROLOGY: no changes in vision, tingling or numbness in hands or feet.   MSK: no muscle aches or pains, weakness  PSYCH: mood stable    Past Medical History:   Diagnosis Date     Chronic lymphocytic thyroiditis 2011      Chronic lymphocytic thyroiditis 7/20/2011     Chronic lymphocytic thyroiditis      Depressive disorder 06/01/1997    Treated for a couple of years; not currently experiencing     Diabetes 6/23/2009       Past Surgical History:   Procedure Laterality Date     HC TOOTH EXTRACTION W/FORCEP       VASECTOMY  01/2019       Family History   Problem Relation Age of Onset     Hyperlipidemia Mother      Diabetes Brother      Hypertension Paternal Grandfather      Cerebrovascular Disease Paternal Grandfather      Hypertension Maternal Grandfather      Hyperlipidemia Maternal Grandfather      Cerebrovascular Disease Maternal Grandfather      Breast Cancer Maternal Aunt      Diabetes Brother      Anxiety Disorder Brother      Coronary Artery Disease Other         Maternal great grandfather     Hypertension Maternal Grandmother      Hyperlipidemia Maternal Grandmother      Hyperlipidemia Brother      Cerebrovascular Disease Paternal Grandmother      Asthma No family hx of      C.A.D. No family hx of      Cancer - colorectal No family hx of      Prostate Cancer No family hx of        Social History     Social History     Marital status:      Spouse name: N/A     Number of children: N/A     Years of education: N/A     Social History Main Topics     Smoking status: Never Smoker     Smokeless tobacco: Never Used     Alcohol use Yes      Comment: ~ 1 drink/day     Drug use: No     Sexual activity: Yes     Partners: Female     Birth control/ protection: Condom, Natural Family Planning      Comment: Would like to talk about vasectomy     Other Topics Concern     Parent/Sibling W/ Cabg, Mi Or Angioplasty Before 65f 55m? No     Social History Narrative   Social Hx:   .  Has a son born in 2011.  Works a  at the [employer]. Now . Physically active- running in the nice weather and playing soccer with his son.    Current Outpatient Medications   Medication     ACE/ARB NOT  "PRESCRIBED, INTENTIONAL,     acetaminophen-codeine (TYLENOL #3) 300-30 MG tablet     atorvastatin (LIPITOR) 10 MG tablet     blood glucose (ESTEVAN BREEZE 2) test strip     blood glucose (NO BRAND SPECIFIED) test strip     blood glucose monitoring (NO BRAND SPECIFIED) meter device kit     Blood Glucose Monitoring Suppl ([employer] 2 SYSTEM) W/DEVICE KIT     glucagon (GLUCAGON EMERGENCY) 1 MG kit     Glucagon 3 MG/DOSE POWD     Injection Device for insulin (NOVOPEN JR, GREEN,) DUSTIN     insulin glargine (LANTUS SOLOSTAR) 100 UNIT/ML pen     insulin pen needle (B-D U/F) 31G X 5 MM miscellaneous     loratadine (CLARITIN) 10 MG tablet     NOVOLOG PENFILL 100 UNIT/ML soln     Sodium Fluoride (CLINPRO 5000) 1.1 % PSTE     VITAMIN D, CHOLECALCIFEROL, PO     No current facility-administered medications for this visit.           Allergies   Allergen Reactions     Ceftin Rash     Seasonal Allergies      Ampicillin Rash       Physical Exam  /74   Pulse 75   Ht 1.778 m (5' 10\")   Wt 79.8 kg (176 lb)   BMI 25.25 kg/m    GENERAL:  Alert and oriented X3, NAD, well dressed, answering questions appropriately, appears stated age.  EXTREMITIES: no edema, +pulses, no rashes, no lesions.      RESULTS  Lab Results   Component Value Date    A1C 8.4 (H) 03/13/2017    A1C 7.6 (H) 02/18/2016    A1C 7.8 (H) 07/07/2015    A1C 6.9 (H) 12/03/2013    A1C 6.9 (A) 09/25/2013    HEMOGLOBINA1 6.8 (A) 09/10/2019    HEMOGLOBINA1 6.6 (A) 06/03/2019    HEMOGLOBINA1 7.0 (A) 03/12/2019    HEMOGLOBINA1 7.9 (A) 11/13/2018    HEMOGLOBINA1 7.7 (A) 08/13/2018       TSH   Date Value Ref Range Status   05/24/2019 1.89 0.40 - 4.00 mU/L Final   04/18/2018 2.20 0.40 - 4.00 mU/L Final   03/13/2017 2.81 0.40 - 4.00 mU/L Final   02/18/2016 3.26 0.40 - 4.00 mU/L Final   07/07/2015 1.70 0.40 - 4.00 mU/L Final     T4 Total   Date Value Ref Range Status   09/28/2010 9.0 5.0 - 11.0 ug/dL Final     T4 Free   Date Value Ref Range Status   02/18/2016 1.06 0.76 - " 1.46 ng/dL Final   07/07/2015 1.04 0.76 - 1.46 ng/dL Final   01/05/2015 1.02 0.76 - 1.46 ng/dL Final     Comment:     Effective 7/30/2014, the reference range for this assay has changed to reflect   new instrumentation/methodology.     12/03/2013 1.08 0.70 - 1.85 ng/dL Final   01/26/2011 1.10 0.70 - 1.85 ng/dL Final       ALT   Date Value Ref Range Status   05/24/2019 25 0 - 70 U/L Final   03/13/2017 24 0 - 70 U/L Final   ]    Recent Labs   Lab Test 05/24/19  0938 05/04/18  1016  01/05/15  0933 12/03/13  0907   CHOL 122 121   < > 134 163   HDL 50 50   < > 47 43   LDL 63 66   < > 76 108   TRIG 43 23   < > 57 57   CHOLHDLRATIO  --   --   --  2.9 3.8    < > = values in this interval not displayed.       Lab Results   Component Value Date     03/13/2017      Lab Results   Component Value Date    POTASSIUM 4.3 03/13/2017     Lab Results   Component Value Date    CHLORIDE 102 03/13/2017     Lab Results   Component Value Date    SHANIKA 9.1 03/13/2017     Lab Results   Component Value Date    CO2 31 03/13/2017     Lab Results   Component Value Date    BUN 15 03/13/2017     Lab Results   Component Value Date    CR 0.88 05/24/2019       GFR Estimate   Date Value Ref Range Status   05/24/2019 >90 >60 mL/min/[1.73_m2] Final     Comment:     Non  GFR Calc  Starting 12/18/2018, serum creatinine based estimated GFR (eGFR) will be   calculated using the Chronic Kidney Disease Epidemiology Collaboration   (CKD-EPI) equation.     04/18/2018 >90 >60 mL/min/1.7m2 Final     Comment:     Non  GFR Calc   03/13/2017 >90  Non  GFR Calc   >60 mL/min/1.7m2 Final     GFR Estimate If Black   Date Value Ref Range Status   05/24/2019 >90 >60 mL/min/[1.73_m2] Final     Comment:      GFR Calc  Starting 12/18/2018, serum creatinine based estimated GFR (eGFR) will be   calculated using the Chronic Kidney Disease Epidemiology Collaboration   (CKD-EPI) equation.     04/18/2018 >90 >60  "mL/min/1.7m2 Final     Comment:      GFR Calc   03/13/2017 >90   GFR Calc   >60 mL/min/1.7m2 Final       Lab Results   Component Value Date    MICROL <5 05/24/2019     No results found for: MICROALBUMIN  Lab Results   Component Value Date    CPEPT 2.3 11/19/2009    GADAB >250.0 (H) 11/19/2009    ISCAB  09/08/2009     1:16  Reference range: <1:4  (Note)  TEST INFORMATION: Islet Cell Ab, IgG  Islet cell antibodies have been associated with  \"autoimmune\" endocrine disorders and insulin-dependent  diabetes. This disorder is characterized by the presence  of antibodies in patients that may be detected years  before the onset of the clinical symptoms. To calculate  Juvenile Diabetes Foundation (JDF) units: multiply the  titer x 5 (1:8  8 x 5 = 40 JDF Units).  Performed by [employer],  [address] [place] [id number] [phone]  [email or web address], Evelia Miles MD, Lab. Director       No results found for: B12]    Most recent eye exam date: : Not Found     Eye exam in January, 2019- no retinopathy    Assessment/Plan:     1.  Type 1 diabetes- Overall, doing well, but he is climbing post-breakfast most often.  A1c is stable at 7.0% today.  He will work on timing his Novolog 15 minutes before breakfast to prevent the spike.  If he continues to climb, may tighten IC ratio at breakfast to 1/18g.  Could consider using the In-pen for dosing calculations and integration with Dexcom in the future.      2.  Risk factors-     Retinopathy:  No.  Had eye exam within last year. Will schedule.   Nephropathy:  BP well controlled. No microalbuminuria.  Creatinine stable.   Neuropathy: No.    Feet: OK, no ulcers.   Lipids:  LDL at target.  Patient taking statin    3.  F/U in 3 months with Dr. Wislon.     I spent 30 minutes with this patient face to face and explained the conditions and plans (more than 50% of time was counseling/coordination of care, diabetes management, follow up plan for " worsening hyper and hypoglycemia) . The patient understood and is satisfied with today's visit.      Liz Conroy PA-C, MPAS   [facility]  Department of Medicine  Division of Endocrinology and Diabetes

## 2019-12-23 ENCOUNTER — MYC REFILL (OUTPATIENT)
Dept: ENDOCRINOLOGY | Facility: CLINIC | Age: 49
End: 2019-12-23

## 2019-12-23 DIAGNOSIS — E78.2 MIXED HYPERLIPIDEMIA: ICD-10-CM

## 2019-12-23 RX ORDER — ATORVASTATIN CALCIUM 10 MG/1
10 TABLET, FILM COATED ORAL DAILY
Qty: 90 TABLET | Refills: 3 | Status: CANCELLED | OUTPATIENT
Start: 2019-12-23

## 2020-03-10 ENCOUNTER — OFFICE VISIT (OUTPATIENT)
Dept: ENDOCRINOLOGY | Facility: CLINIC | Age: 50
End: 2020-03-10
Payer: COMMERCIAL

## 2020-03-10 VITALS
WEIGHT: 175.4 LBS | BODY MASS INDEX: 25.11 KG/M2 | DIASTOLIC BLOOD PRESSURE: 83 MMHG | HEIGHT: 70 IN | HEART RATE: 66 BPM | SYSTOLIC BLOOD PRESSURE: 127 MMHG

## 2020-03-10 DIAGNOSIS — E10.9 TYPE 1 DIABETES MELLITUS WITHOUT COMPLICATION (H): ICD-10-CM

## 2020-03-10 DIAGNOSIS — E78.2 MIXED HYPERLIPIDEMIA: Primary | ICD-10-CM

## 2020-03-10 LAB — HBA1C MFR BLD: 6.9 % (ref 4.3–6)

## 2020-03-10 ASSESSMENT — MIFFLIN-ST. JEOR: SCORE: 1676.86

## 2020-03-10 ASSESSMENT — PAIN SCALES - GENERAL: PAINLEVEL: NO PAIN (0)

## 2020-03-10 NOTE — LETTER
3/10/2020       RE: Franklin Muir  [address]  [place] [address]     Dear Colleague,    Thank you for referring your patient, Franklin Muir, to the [facility] ENDOCRINOLOGY at [facility]. Please see a copy of my visit note below.                                SUBJECTIVE:  Franklin Muir is a 49 year old male with a PMHx of type 1 DM, diagnosed 10 years ago here for follow up on type 1 DM.    He is on Lantus 10 U daily at 7 am. Novolog 1 U /20 g of CHO with meals and snacks and premeals correction @ 1/2 U per 30 mg/Ll above 150 mg/dL during the day and over 200 mg/dL HS.     He feels things are going well for him now. He feels like he has higher spikes for breakfast, and notes that he takes his Novolog ~5-10 minutes before his breakfast. He also has noticed hid glucose trends up around 4am. This is more pronounced when he's had a late snack, but usually goes to bed around midnight, and wakes up at 7. He has infrequent lows, and is symptomatic below 70.    He denies any numbness, tingling, or vision changes.     He is wearing Dexcom G6 sensor with overall average of 171 mg/dl over the last month.   He is been 62% in target range. 0% low.  Patient develops hypoglycemic symptoms when blood glucose levels are below 70 mg/dL.  A1C today 6.9%.   Dexcom alert settings are as follows:  Low 70 mg/dL  Urgent low 55 mg/dL, repeat 30 minutes  High 350 mg/dL  Full and rise rate 3 mg/dL/min    Franklin also has a history of Hashimoto's Ab positivity with normal function, has never been on levothyroxine replacement therapy.  No constipation, fatigue, or swelling.    Diabetes Care:  Retinopathy:  No.  Had eye exam within last year.   Nephropathy:  BP well controlled. No microalbuminuria.  Creatinine stable.   Neuropathy: No.    Feet: OK, no ulcers. Had a blister that was slow to heal.   Lipids:  LDL at target.  Patient taking statin      Medications and allergies reviewed by me  "today.     ROS:   11 point ROS, including but not limited to Constitutional, HEENT, cardiovascular, pulmonary, gi and gu systems are negative, except as otherwise noted.      Answers for HPI/ROS submitted by the patient on 2/26/2020   General Symptoms: No  Skin Symptoms: Yes  HENT Symptoms: No  EYE SYMPTOMS: No  HEART SYMPTOMS: Yes  LUNG SYMPTOMS: No  INTESTINAL SYMPTOMS: No  URINARY SYMPTOMS: No  REPRODUCTIVE SYMPTOMS: No  SKELETAL SYMPTOMS: No  BLOOD SYMPTOMS: No  NERVOUS SYSTEM SYMPTOMS: No  MENTAL HEALTH SYMPTOMS: No  Rashes: Yes  Chest pain or pressure: Yes    Past Medical History:   Diagnosis Date     Chronic lymphocytic thyroiditis 7/20/2011     Chronic lymphocytic thyroiditis 7/20/2011     Chronic lymphocytic thyroiditis      Depressive disorder 06/01/1997    Treated for a couple of years; not currently experiencing     Diabetes 6/23/2009     Past Surgical History:   Procedure Laterality Date     HC TOOTH EXTRACTION W/FORCEP       VASECTOMY  01/2019     Social Hx:   .  Has a young son.  Works a  at the [employer].      OBJECTIVE:  /83   Pulse 66   Ht 1.778 m (5' 10\")   Wt 79.6 kg (175 lb 6.4 oz)   BMI 25.17 kg/m    General: NAD, pleasant and conversant   Skin: no lesions on visible areas of skin  HEENT: EOMI   Resp: CTAB  CV: RRR  Extremities: warm and no edema. Left dorsalis pedis pulse not palpable. Skin on feet quite dry bilaterally, with fungal infection of toe nails present bilaterally.  Psych: Appropriate mood and affect     ASSESSMENT/PLAN:    Franklin has a 10 years history of type 1 DM and he was seen today for recheck.    Diagnoses and all orders for this visit:    1. Type 1 diabetes mellitus without complication (H)  Well controlled, aside from increases from 9994-0006, A1c 6.9% today, (6.8% 9/2020). He also has been overestimating his carb count at breakfast to minimize the spike in glucose, but we discussed taking his Novolog 20 minutes before " breakfast to help with this. Given his low dose of lantus, and slightly poorer control in the morning before breakfast, will switch lantus to Tresiba.  If insurance rejects, could consider BID lantus dosing.       -     Hemoglobin A1c POCT  -     Glucagon 3 MG/DOSE POWD; Spray 3 mg in nostril as needed (Hypoglycemia) 3 mg (one actuation) into a single nostril; if no response, may repeat in 15 minutes using a new intranasal device.  -      Recheck TSH, Microalbuminuria, and CBC prior to next appt.    2.  Chronic complications of diabetes  Diabetes Care:  Retinopathy:  None.  Had eye exam within last year.  Nephropathy:  BP well controlled. No microalbuminuria.  Creatinine stable.   Neuropathy: No.    Feet: Some nail fungus, and report of slow wound healing. Recommended seeing podiatry for foot care.     3.  Cardiovascular risk factors  Lipids:  LDL at target.  Patient taking statin  BP: Blood pressure is on target    4.  Positive TPO antibodies  TSH wnl  5/2019, will repeat prior to next visit.    Orders Placed This Encounter   Procedures     TSH     Albumin Random Urine Quantitative with Creat Ratio     CBC with platelets differential     Hemoglobin A1c POCT       Pt should return to clinic for f/u with Liz Conroy in 3 months, and Dr. Wilson in 6 months.    Scribe Disclosure:   I, Geovanny Cruz, am serving as a scribe; to document services personally performed by Dr. Wilson- -based on data collection and the provider's statements to me.     Provider Disclosure:  I agree with above History, Review of Systems, Physical exam and Plan.  I have reviewed the content of the documentation and have edited it as needed. I have personally performed the services documented here and the documentation accurately represents those services and the decisions I have made.      Physician Attestation   IYESSENIA, was present with the medical student who participated in the service and in the documentation of  the note.  I have verified the history and personally performed the physical exam and medical decision making.  I agree with the assessment and plan of care as documented in the note.      Items personally reviewed: vitals, labs and CGM download and agree with the interpretation documented in the note.    YESSENIA Wilson MD

## 2020-03-10 NOTE — PROGRESS NOTES
SUBJECTIVE:  Franklin Muir is a 49 year old male with a PMHx of type 1 DM, diagnosed 10 years ago here for follow up on type 1 DM.    He is on Lantus 10 U daily at 7 am. Novolog 1 U /20 g of CHO with meals and snacks and premeals correction @ 1/2 U per 30 mg/Ll above 150 mg/dL during the day and over 200 mg/dL HS.     He feels things are going well for him now. He feels like he has higher spikes for breakfast, and notes that he takes his Novolog ~5-10 minutes before his breakfast. He also has noticed hid glucose trends up around 4am. This is more pronounced when he's had a late snack, but usually goes to bed around midnight, and wakes up at 7. He has infrequent lows, and is symptomatic below 70.    He denies any numbness, tingling, or vision changes.     He is wearing Dexcom G6 sensor with overall average of 171 mg/dl over the last month.   He is been 62% in target range. 0% low.  Patient develops hypoglycemic symptoms when blood glucose levels are below 70 mg/dL.  A1C today 6.9%.   Dexcom alert settings are as follows:  Low 70 mg/dL  Urgent low 55 mg/dL, repeat 30 minutes  High 350 mg/dL  Full and rise rate 3 mg/dL/min    Franklin also has a history of Hashimoto's Ab positivity with normal function, has never been on levothyroxine replacement therapy.  No constipation, fatigue, or swelling.    Diabetes Care:  Retinopathy:  No.  Had eye exam within last year.   Nephropathy:  BP well controlled. No microalbuminuria.  Creatinine stable.   Neuropathy: No.    Feet: OK, no ulcers. Had a blister that was slow to heal.   Lipids:  LDL at target.  Patient taking statin      Medications and allergies reviewed by me today.     ROS:   11 point ROS, including but not limited to Constitutional, HEENT, cardiovascular, pulmonary, gi and gu systems are negative, except as otherwise noted.      Answers for HPI/ROS submitted by the patient on 2/26/2020   General Symptoms: No  Skin Symptoms: Yes  HUGH  "Symptoms: No  EYE SYMPTOMS: No  HEART SYMPTOMS: Yes  LUNG SYMPTOMS: No  INTESTINAL SYMPTOMS: No  URINARY SYMPTOMS: No  REPRODUCTIVE SYMPTOMS: No  SKELETAL SYMPTOMS: No  BLOOD SYMPTOMS: No  NERVOUS SYSTEM SYMPTOMS: No  MENTAL HEALTH SYMPTOMS: No  Rashes: Yes  Chest pain or pressure: Yes    Past Medical History:   Diagnosis Date     Chronic lymphocytic thyroiditis 7/20/2011     Chronic lymphocytic thyroiditis 7/20/2011     Chronic lymphocytic thyroiditis      Depressive disorder 06/01/1997    Treated for a couple of years; not currently experiencing     Diabetes 6/23/2009     Past Surgical History:   Procedure Laterality Date     HC TOOTH EXTRACTION W/FORCEP       VASECTOMY  01/2019     Social Hx:   .  Has a young son.  Works a  at the [employer].      OBJECTIVE:  /83   Pulse 66   Ht 1.778 m (5' 10\")   Wt 79.6 kg (175 lb 6.4 oz)   BMI 25.17 kg/m    General: NAD, pleasant and conversant   Skin: no lesions on visible areas of skin  HEENT: EOMI   Resp: CTAB  CV: RRR  Extremities: warm and no edema. Left dorsalis pedis pulse not palpable. Skin on feet quite dry bilaterally, with fungal infection of toe nails present bilaterally.  Psych: Appropriate mood and affect     ASSESSMENT/PLAN:    Franklin has a 10 years history of type 1 DM and he was seen today for recheck.    Diagnoses and all orders for this visit:    1. Type 1 diabetes mellitus without complication (H)  Well controlled, aside from increases from 5621-4582, A1c 6.9% today, (6.8% 9/2020). He also has been overestimating his carb count at breakfast to minimize the spike in glucose, but we discussed taking his Novolog 20 minutes before breakfast to help with this. Given his low dose of lantus, and slightly poorer control in the morning before breakfast, will switch lantus to Tresiba.  If insurance rejects, could consider BID lantus dosing.       -     Hemoglobin A1c POCT  -     Glucagon 3 MG/DOSE POWD; Spray 3 mg in " nostril as needed (Hypoglycemia) 3 mg (one actuation) into a single nostril; if no response, may repeat in 15 minutes using a new intranasal device.  -      Recheck TSH, Microalbuminuria, and CBC prior to next appt.    2.  Chronic complications of diabetes  Diabetes Care:  Retinopathy:  None.  Had eye exam within last year.  Nephropathy:  BP well controlled. No microalbuminuria.  Creatinine stable.   Neuropathy: No.    Feet: Some nail fungus, and report of slow wound healing. Recommended seeing podiatry for foot care.     3.  Cardiovascular risk factors  Lipids:  LDL at target.  Patient taking statin  BP: Blood pressure is on target    4.  Positive TPO antibodies  TSH wnl  5/2019, will repeat prior to next visit.    Orders Placed This Encounter   Procedures     TSH     Albumin Random Urine Quantitative with Creat Ratio     CBC with platelets differential     Hemoglobin A1c POCT       Pt should return to clinic for f/u with Liz Conroy in 3 months, and Dr. Wilson in 6 months.    Scribe Disclosure:   I, Geovanny Cruz, am serving as a scribe; to document services personally performed by Dr. Wilson- -based on data collection and the provider's statements to me.     Provider Disclosure:  I agree with above History, Review of Systems, Physical exam and Plan.  I have reviewed the content of the documentation and have edited it as needed. I have personally performed the services documented here and the documentation accurately represents those services and the decisions I have made.      Physician Attestation   I, YESSENIA Wilson, was present with the medical student who participated in the service and in the documentation of the note.  I have verified the history and personally performed the physical exam and medical decision making.  I agree with the assessment and plan of care as documented in the note.      Items personally reviewed: vitals, labs and CGM download and agree with the interpretation  documented in the note.    YESSENIA Wilson MD

## 2020-03-10 NOTE — PATIENT INSTRUCTIONS
-We will send in a prescription for tresiba, we can talk about alternatives if your insurance rejects this. In the meantime, continue lantus as scheduled  -Bloodwork orders in before your next appointment  -See a podiatrist, potentially Joey Villalba at [place]

## 2020-03-18 ENCOUNTER — MYC MEDICAL ADVICE (OUTPATIENT)
Dept: ENDOCRINOLOGY | Facility: CLINIC | Age: 50
End: 2020-03-18

## 2020-03-18 DIAGNOSIS — E10.65 TYPE 1 DIABETES MELLITUS WITH HYPERGLYCEMIA (H): Primary | ICD-10-CM

## 2020-03-18 NOTE — TELEPHONE ENCOUNTER
Clinic notes 03/10/2020: 1. Type 1 diabetes mellitus without complication (H)  Well controlled, aside from increases from 7465-2617, A1c 6.9% today, (6.8% 9/2020). He also has been overestimating his carb count at breakfast to minimize the spike in glucose, but we discussed taking his Novolog 20 minutes before breakfast to help with this. Given his low dose of lantus, and slightly poorer control in the morning before breakfast, will switch lantus to Tresiba.  If insurance rejects, could consider BID lantus dosing.

## 2020-06-12 NOTE — PROGRESS NOTES
"Patients Glucose Data is on dexcom     Franklin Muir is a 50 year old male who is being evaluated via a billable video visit.      The patient has been notified of following:     \"This video visit will be conducted via a call between you and your physician/provider. We have found that certain health care needs can be provided without the need for an in-person physical exam.  This service lets us provide the care you need with a video conversation.  If a prescription is necessary we can send it directly to your pharmacy.  If lab work is needed we can place an order for that and you can then stop by our lab to have the test done at a later time.    Video visits are billed at different rates depending on your insurance coverage.  Please reach out to your insurance provider with any questions.    If during the course of the call the physician/provider feels a video visit is not appropriate, you will not be charged for this service.\"    Patient has given verbal consent for Video visit? Yes    Will anyone else be joining your video visit? No        Video-Visit Details    Type of service:  Video Visit    Video Start Time:1008  Video End Time:1044     Originating Location (pt. Location):home    Distant Location (provider location):   [facility]     Platform used for Video Visit: Ren[employer]©Sim    HPI:   Franklin is a 47 yo man here for follow up of type 1 diabetes, diagnosed about 10 years ago.  He also sees Dr. Wilson.  He switched to Tresiba from Lantus since his last visit.  He feels his blood sugars are better on this insulin, but he has trouble preventing lows with exercise now before running.  He waits until he is at least 200 mg/dL before exercising.  He usually jogs before dinner, around 5:30 or 6pm.  His currently on  tresiba 9 units daily at 7 AM and Novolog 1 unit/20 grams of CHO with meals and snacks, and for pre-meal correction @ 1/2 U per 30 mg/dL above 150 during the day and over 200 at HS.He finds it " challenging to figure out when he can take a correction to bring him down if he climbs after eating.     Franklin wears a Dexcom G6 sensor with overall average of 153mg/dL for the past two weeks.        He really likes having a sensor and feels it helps him manage his diabetes better.  He has never worn a pump.  He does have strong symptoms alerting him to hypoglycemia.  His brother also had type 1 diabetes and  of DKA.    He is very physically active, even moreso since he has been working from home in the pandemic.  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      Franklin also has a history of Hashimoto's ab positivity with normal function, has never been on levothyroxine replacement therapy.  He has no palpitations, but has occasional tightness in his chest, not related to physical activity.  He initially thought it was anxiety, but he is not sure.  He has excellent exercise tolerance and no other associated symptoms.     He generally has been feeling well and has not concerns today.     ROS   GENERAL: no weight loss, weight gain, fevers.     HEENT: no dysphagia, diplopia, neck pain or tenderness, dry/scratchy eyes, URI, cough, sinus drainage, tinnitus, sinus pressure  CV: no chest pain, pressure, palpitations, skipped beats, LOC  LUNGS: no SOB, FOUNTAIN, cough, sputum production, wheezing   GI: no diarrhea, constipation, abdominal pain  EXTREMITIES: no rashes, ulcers, edema  NEUROLOGY: no changes in vision, tingling or numbness in hands or feet.   MSK: no muscle aches or pains, weakness  PSYCH: mood stable    Past Medical History:   Diagnosis Date     Chronic lymphocytic thyroiditis 2011     Chronic lymphocytic thyroiditis 2011     Chronic lymphocytic thyroiditis      Depressive disorder 1997    Treated for a couple of years; not currently experiencing     Diabetes 2009       Past Surgical History:   Procedure Laterality Date     HC  TOOTH EXTRACTION W/FORCEP       VASECTOMY  01/2019       Family History   Problem Relation Age of Onset     Hyperlipidemia Mother      Diabetes Brother      Hypertension Paternal Grandfather      Cerebrovascular Disease Paternal Grandfather      Hypertension Maternal Grandfather      Hyperlipidemia Maternal Grandfather      Cerebrovascular Disease Maternal Grandfather      Breast Cancer Maternal Aunt      Diabetes Brother      Anxiety Disorder Brother      Coronary Artery Disease Other         Maternal great grandfather     Hypertension Maternal Grandmother      Hyperlipidemia Maternal Grandmother      Hyperlipidemia Brother      Cerebrovascular Disease Paternal Grandmother      Asthma No family hx of      C.A.D. No family hx of      Cancer - colorectal No family hx of      Prostate Cancer No family hx of        Social History     Social History     Marital status:      Spouse name: N/A     Number of children: N/A     Years of education: N/A     Social History Main Topics     Smoking status: Never Smoker     Smokeless tobacco: Never Used     Alcohol use Yes      Comment: ~ 1 drink/day     Drug use: No     Sexual activity: Yes     Partners: Female     Birth control/ protection: Condom, Natural Family Planning      Comment: Would like to talk about vasectomy     Other Topics Concern     Parent/Sibling W/ Cabg, Mi Or Angioplasty Before 65f 55m? No     Social History Narrative   Social Hx:   .  Has a son born in 2011.  Works a  at the [employer]. Now . Physically active- running in the nice weather and playing soccer with his son.    Current Outpatient Medications   Medication     ACE/ARB NOT PRESCRIBED, INTENTIONAL,     atorvastatin (LIPITOR) 10 MG tablet     blood glucose (NO BRAND SPECIFIED) test strip     blood glucose monitoring (NO BRAND SPECIFIED) meter device kit     Continuous Blood Gluc Sensor (DEXCOM G6 SENSOR) MISC     Continuous Blood Gluc Transmit  (DEXCOM G6 TRANSMITTER) MISC     glucagon (GLUCAGON EMERGENCY) 1 MG kit     Glucagon 3 MG/DOSE POWD     insulin degludec (TRESIBA FLEXTOUCH) 100 UNIT/ML pen     insulin pen needle (B-D U/F) 31G X 5 MM miscellaneous     ketoconazole (NIZORAL) 2 % external shampoo     loratadine (CLARITIN) 10 MG tablet     NOVOLOG PENFILL 100 UNIT/ML soln     Sodium Fluoride (CLINPRO 5000) 1.1 % PSTE     VITAMIN D, CHOLECALCIFEROL, PO     blood glucose (ESTEVAN BREEZE 2) test strip     Blood Glucose Monitoring Suppl (C[employer]dcast BREEZE 2 SYSTEM) W/DEVICE KIT     Injection Device for insulin (NOVOPEN JR, GREEN,) DUSTIN     insulin glargine (LANTUS SOLOSTAR) 100 UNIT/ML pen     No current facility-administered medications for this visit.           Allergies   Allergen Reactions     Ceftin Rash     Seasonal Allergies      Ampicillin Rash       Physical Exam  There were no vitals taken for this visit.   GENERAL: healthy, alert and no distress  RESP: no audible wheeze, cough, or visible cyanosis.  No visible retractions or increased work of breathing.  Able to speak fully in complete sentences.  PSYCH: mentation appears normal, affect normal/bright, judgement and insight intact, normal speech and appearance well-groomed  EXTREMITIES: no edema    Physical Exam  There were no vitals taken for this visit.   GENERAL: healthy, alert and no distress  RESP: no audible wheeze, cough, or visible cyanosis.  No visible retractions or increased work of breathing.  Able to speak fully in complete sentences.  PSYCH: mentation appears normal, affect normal/bright, judgement and insight intact, normal speech and appearance well-groomed  EXTREMITIES: no edema    RESULTS  Lab Results   Component Value Date    A1C 8.4 (H) 03/13/2017    A1C 7.6 (H) 02/18/2016    A1C 7.8 (H) 07/07/2015    A1C 6.9 (H) 12/03/2013    A1C 6.9 (A) 09/25/2013    HEMOGLOBINA1 6.9 (A) 03/10/2020    HEMOGLOBINA1 6.8 (A) 09/10/2019    HEMOGLOBINA1 6.6 (A) 06/03/2019    HEMOGLOBINA1 7.0 (A)  03/12/2019    HEMOGLOBINA1 7.9 (A) 11/13/2018       TSH   Date Value Ref Range Status   05/24/2019 1.89 0.40 - 4.00 mU/L Final   04/18/2018 2.20 0.40 - 4.00 mU/L Final   03/13/2017 2.81 0.40 - 4.00 mU/L Final   02/18/2016 3.26 0.40 - 4.00 mU/L Final   07/07/2015 1.70 0.40 - 4.00 mU/L Final     T4 Total   Date Value Ref Range Status   09/28/2010 9.0 5.0 - 11.0 ug/dL Final     T4 Free   Date Value Ref Range Status   02/18/2016 1.06 0.76 - 1.46 ng/dL Final   07/07/2015 1.04 0.76 - 1.46 ng/dL Final   01/05/2015 1.02 0.76 - 1.46 ng/dL Final     Comment:     Effective 7/30/2014, the reference range for this assay has changed to reflect   new instrumentation/methodology.     12/03/2013 1.08 0.70 - 1.85 ng/dL Final   01/26/2011 1.10 0.70 - 1.85 ng/dL Final       ALT   Date Value Ref Range Status   05/24/2019 25 0 - 70 U/L Final   03/13/2017 24 0 - 70 U/L Final   ]    Recent Labs   Lab Test 05/24/19  0938 05/04/18  1016  01/05/15  0933 12/03/13  0907   CHOL 122 121   < > 134 163   HDL 50 50   < > 47 43   LDL 63 66   < > 76 108   TRIG 43 23   < > 57 57   CHOLHDLRATIO  --   --   --  2.9 3.8    < > = values in this interval not displayed.       Lab Results   Component Value Date     03/13/2017      Lab Results   Component Value Date    POTASSIUM 4.3 03/13/2017     Lab Results   Component Value Date    CHLORIDE 102 03/13/2017     Lab Results   Component Value Date    SHANIKA 9.1 03/13/2017     Lab Results   Component Value Date    CO2 31 03/13/2017     Lab Results   Component Value Date    BUN 15 03/13/2017     Lab Results   Component Value Date    CR 0.88 05/24/2019       GFR Estimate   Date Value Ref Range Status   05/24/2019 >90 >60 mL/min/[1.73_m2] Final     Comment:     Non  GFR Calc  Starting 12/18/2018, serum creatinine based estimated GFR (eGFR) will be   calculated using the Chronic Kidney Disease Epidemiology Collaboration   (CKD-EPI) equation.     04/18/2018 >90 >60 mL/min/1.7m2 Final     Comment:     " Non  GFR Calc   03/13/2017 >90  Non  GFR Calc   >60 mL/min/1.7m2 Final     GFR Estimate If Black   Date Value Ref Range Status   05/24/2019 >90 >60 mL/min/[1.73_m2] Final     Comment:      GFR Calc  Starting 12/18/2018, serum creatinine based estimated GFR (eGFR) will be   calculated using the Chronic Kidney Disease Epidemiology Collaboration   (CKD-EPI) equation.     04/18/2018 >90 >60 mL/min/1.7m2 Final     Comment:      GFR Calc   03/13/2017 >90   GFR Calc   >60 mL/min/1.7m2 Final       Lab Results   Component Value Date    MICROL <5 05/24/2019     No results found for: MICROALBUMIN  Lab Results   Component Value Date    CPEPT 2.3 11/19/2009    GADAB >250.0 (H) 11/19/2009    ISCAB  09/08/2009     1:16  Reference range: <1:4  (Note)  TEST INFORMATION: Islet Cell Ab, IgG  Islet cell antibodies have been associated with  \"autoimmune\" endocrine disorders and insulin-dependent  diabetes. This disorder is characterized by the presence  of antibodies in patients that may be detected years  before the onset of the clinical symptoms. To calculate  Juvenile Diabetes Foundation (JDF) units: multiply the  titer x 5 (1:8  8 x 5 = 40 JDF Units).  Performed by [facility],  [address] [place] [id number] [phone]  [email or web address], Evelia Miles MD, Lab. Director       No results found for: B12]    Most recent eye exam date: : Not Found     Eye exam in January, 2019- no retinopathy- due for exam.    Assessment/Plan:     1.  Type 1 diabetes- Franklin's glucose control has improved.  He is struggling with having to raise his glucose over 200 mg/dL in order to exercise, and even then, his glucose tends to drop very quickly.  He has managed to work around this, however it is inconvenient.  We again discussed pump technology and advances that have come in the last few months with hybrid closed loop technology.  This could allow for more " flexibility with activity and less risk for hypoglycemia.  At this point, he is not interested in a pump.   We also talked about the option of using an In-pen for dosing calculations and for seeing his active insulin on board on an vikash.  He may be interested in this idea.   He will learn more about it and let me know if he would like to order it.  In the meantime, he feels comfortable with his current regimen and prefers not to make any changes today.  Discussed COVID-19 precautions and increased incidence of DKA.  Will send rx for ketone strips.   The American Diabetes Association has issued a nice video with checklist for patients with diabetes: https://www.diabetes.org/diabetes/treatment-care/planning-sick-days/coronavirus    2.  Risk factors-     Retinopathy:  No.  Had eye exam last year. Will schedule.   Nephropathy:  BP historically well controlled. No microalbuminuria.  Creatinine stable.   Neuropathy: No.    Feet: OK, no ulcers.   Lipids:  LDL at target.  Patient taking statin  Celiac screening: negative 2018  Will check labs at his convenience.      3.  Positive TPO antibodies- will check TSH.   He has been having some chest tightness unrelated to exertion, but does not seem to be palpitations.   Asked him to keep a log of symptoms and f/u with PCP if they persist.     4. F/U in 3 months with Dr. Wilson, 6 months with me.      Liz Conroy PA-C, MPAS   [facility]  Department of Medicine  Division of Endocrinology and Diabetes

## 2020-06-15 ENCOUNTER — VIRTUAL VISIT (OUTPATIENT)
Dept: ENDOCRINOLOGY | Facility: CLINIC | Age: 50
End: 2020-06-15
Payer: COMMERCIAL

## 2020-06-15 DIAGNOSIS — E10.9 WELL CONTROLLED TYPE 1 DIABETES MELLITUS (H): Primary | ICD-10-CM

## 2020-06-15 NOTE — LETTER
"6/15/2020       RE: Franklin Muir  [address]  [place] [address]     Dear Colleague,    Thank you for referring your patient, Franklin Muir, to the [facility] ENDOCRINOLOGY at [facility]. Please see a copy of my visit note below.    Patients Glucose Data is on dexcom     Franklin Muir is a 50 year old male who is being evaluated via a billable video visit.      The patient has been notified of following:     \"This video visit will be conducted via a call between you and your physician/provider. We have found that certain health care needs can be provided without the need for an in-person physical exam.  This service lets us provide the care you need with a video conversation.  If a prescription is necessary we can send it directly to your pharmacy.  If lab work is needed we can place an order for that and you can then stop by our lab to have the test done at a later time.    Video visits are billed at different rates depending on your insurance coverage.  Please reach out to your insurance provider with any questions.    If during the course of the call the physician/provider feels a video visit is not appropriate, you will not be charged for this service.\"    Patient has given verbal consent for Video visit? Yes    Will anyone else be joining your video visit? No        Video-Visit Details    Type of service:  Video Visit    Video Start Time:1008  Video End Time:1044     Originating Location (pt. Location):home    Distant Location (provider location):  [facility] ENDOCRINOLOGY     Platform used for Video Visit: Xinrong    HPI:   Franklin is a 47 yo man here for follow up of type 1 diabetes, diagnosed about 10 years ago.  He also sees Dr. Wilson.  He switched to Tresiba from Lantus since his last visit.  He feels his blood sugars are better on this insulin, but he has trouble preventing lows with exercise now before running.  He waits until he is at least 200 mg/dL " before exercising.  He usually jogs before dinner, around 5:30 or 6pm.  His currently on  tresiba 9 units daily at 7 AM and Novolog 1 unit/20 grams of CHO with meals and snacks, and for pre-meal correction @ 1/2 U per 30 mg/dL above 150 during the day and over 200 at HS.He finds it challenging to figure out when he can take a correction to bring him down if he climbs after eating.     Franklin wears a Dexcom G6 sensor with overall average of 153mg/dL for the past two weeks.        He really likes having a sensor and feels it helps him manage his diabetes better.  He has never worn a pump.  He does have strong symptoms alerting him to hypoglycemia.  His brother also had type 1 diabetes and  of DKA.    He is very physically active, even moreso since he has been working from home in the pandemic.  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      Franklin also has a history of Hashimoto's ab positivity with normal function, has never been on levothyroxine replacement therapy.  He has no palpitations, but has occasional tightness in his chest, not related to physical activity.  He initially thought it was anxiety, but he is not sure.  He has excellent exercise tolerance and no other associated symptoms.     He generally has been feeling well and has not concerns today.     ROS   GENERAL: no weight loss, weight gain, fevers.     HEENT: no dysphagia, diplopia, neck pain or tenderness, dry/scratchy eyes, URI, cough, sinus drainage, tinnitus, sinus pressure  CV: no chest pain, pressure, palpitations, skipped beats, LOC  LUNGS: no SOB, FOUNTAIN, cough, sputum production, wheezing   GI: no diarrhea, constipation, abdominal pain  EXTREMITIES: no rashes, ulcers, edema  NEUROLOGY: no changes in vision, tingling or numbness in hands or feet.   MSK: no muscle aches or pains, weakness  PSYCH: mood stable    Past Medical History:   Diagnosis Date     Chronic lymphocytic  thyroiditis 7/20/2011     Chronic lymphocytic thyroiditis 7/20/2011     Chronic lymphocytic thyroiditis      Depressive disorder 06/01/1997    Treated for a couple of years; not currently experiencing     Diabetes 6/23/2009       Past Surgical History:   Procedure Laterality Date     HC TOOTH EXTRACTION W/FORCEP       VASECTOMY  01/2019       Family History   Problem Relation Age of Onset     Hyperlipidemia Mother      Diabetes Brother      Hypertension Paternal Grandfather      Cerebrovascular Disease Paternal Grandfather      Hypertension Maternal Grandfather      Hyperlipidemia Maternal Grandfather      Cerebrovascular Disease Maternal Grandfather      Breast Cancer Maternal Aunt      Diabetes Brother      Anxiety Disorder Brother      Coronary Artery Disease Other         Maternal great grandfather     Hypertension Maternal Grandmother      Hyperlipidemia Maternal Grandmother      Hyperlipidemia Brother      Cerebrovascular Disease Paternal Grandmother      Asthma No family hx of      C.A.D. No family hx of      Cancer - colorectal No family hx of      Prostate Cancer No family hx of        Social History     Social History     Marital status:      Spouse name: N/A     Number of children: N/A     Years of education: N/A     Social History Main Topics     Smoking status: Never Smoker     Smokeless tobacco: Never Used     Alcohol use Yes      Comment: ~ 1 drink/day     Drug use: No     Sexual activity: Yes     Partners: Female     Birth control/ protection: Condom, Natural Family Planning      Comment: Would like to talk about vasectomy     Other Topics Concern     Parent/Sibling W/ Cabg, Mi Or Angioplasty Before 65f 55m? No     Social History Narrative   Social Hx:   .  Has a son born in 2011.  Works a  at the [employer]. Now . Physically active- running in the nice weather and playing soccer with his son.    Current Outpatient Medications   Medication      ACE/ARB NOT PRESCRIBED, INTENTIONAL,     atorvastatin (LIPITOR) 10 MG tablet     blood glucose (NO BRAND SPECIFIED) test strip     blood glucose monitoring (NO BRAND SPECIFIED) meter device kit     Continuous Blood Gluc Sensor (DEXCOM G6 SENSOR) MISC     Continuous Blood Gluc Transmit (DEXCOM G6 TRANSMITTER) MISC     glucagon (GLUCAGON EMERGENCY) 1 MG kit     Glucagon 3 MG/DOSE POWD     insulin degludec (TRESIBA FLEXTOUCH) 100 UNIT/ML pen     insulin pen needle (B-D U/F) 31G X 5 MM miscellaneous     ketoconazole (NIZORAL) 2 % external shampoo     loratadine (CLARITIN) 10 MG tablet     NOVOLOG PENFILL 100 UNIT/ML soln     Sodium Fluoride (CLINPRO 5000) 1.1 % PSTE     VITAMIN D, CHOLECALCIFEROL, PO     blood glucose (ESTEVAN BREEZE 2) test strip     Blood Glucose Monitoring Suppl (Caption DataEZE 2 SYSTEM) W/DEVICE KIT     Injection Device for insulin (NOVOPEN JR, GREEN,) DUSTIN     insulin glargine (LANTUS SOLOSTAR) 100 UNIT/ML pen     No current facility-administered medications for this visit.           Allergies   Allergen Reactions     Ceftin Rash     Seasonal Allergies      Ampicillin Rash       Physical Exam  There were no vitals taken for this visit.   GENERAL: healthy, alert and no distress  RESP: no audible wheeze, cough, or visible cyanosis.  No visible retractions or increased work of breathing.  Able to speak fully in complete sentences.  PSYCH: mentation appears normal, affect normal/bright, judgement and insight intact, normal speech and appearance well-groomed  EXTREMITIES: no edema    Physical Exam  There were no vitals taken for this visit.   GENERAL: healthy, alert and no distress  RESP: no audible wheeze, cough, or visible cyanosis.  No visible retractions or increased work of breathing.  Able to speak fully in complete sentences.  PSYCH: mentation appears normal, affect normal/bright, judgement and insight intact, normal speech and appearance well-groomed  EXTREMITIES: no edema    RESULTS  Lab  Results   Component Value Date    A1C 8.4 (H) 03/13/2017    A1C 7.6 (H) 02/18/2016    A1C 7.8 (H) 07/07/2015    A1C 6.9 (H) 12/03/2013    A1C 6.9 (A) 09/25/2013    HEMOGLOBINA1 6.9 (A) 03/10/2020    HEMOGLOBINA1 6.8 (A) 09/10/2019    HEMOGLOBINA1 6.6 (A) 06/03/2019    HEMOGLOBINA1 7.0 (A) 03/12/2019    HEMOGLOBINA1 7.9 (A) 11/13/2018       TSH   Date Value Ref Range Status   05/24/2019 1.89 0.40 - 4.00 mU/L Final   04/18/2018 2.20 0.40 - 4.00 mU/L Final   03/13/2017 2.81 0.40 - 4.00 mU/L Final   02/18/2016 3.26 0.40 - 4.00 mU/L Final   07/07/2015 1.70 0.40 - 4.00 mU/L Final     T4 Total   Date Value Ref Range Status   09/28/2010 9.0 5.0 - 11.0 ug/dL Final     T4 Free   Date Value Ref Range Status   02/18/2016 1.06 0.76 - 1.46 ng/dL Final   07/07/2015 1.04 0.76 - 1.46 ng/dL Final   01/05/2015 1.02 0.76 - 1.46 ng/dL Final     Comment:     Effective 7/30/2014, the reference range for this assay has changed to reflect   new instrumentation/methodology.     12/03/2013 1.08 0.70 - 1.85 ng/dL Final   01/26/2011 1.10 0.70 - 1.85 ng/dL Final       ALT   Date Value Ref Range Status   05/24/2019 25 0 - 70 U/L Final   03/13/2017 24 0 - 70 U/L Final   ]    Recent Labs   Lab Test 05/24/19  0938 05/04/18  1016  01/05/15  0933 12/03/13  0907   CHOL 122 121   < > 134 163   HDL 50 50   < > 47 43   LDL 63 66   < > 76 108   TRIG 43 23   < > 57 57   CHOLHDLRATIO  --   --   --  2.9 3.8    < > = values in this interval not displayed.       Lab Results   Component Value Date     03/13/2017      Lab Results   Component Value Date    POTASSIUM 4.3 03/13/2017     Lab Results   Component Value Date    CHLORIDE 102 03/13/2017     Lab Results   Component Value Date    SHANIKA 9.1 03/13/2017     Lab Results   Component Value Date    CO2 31 03/13/2017     Lab Results   Component Value Date    BUN 15 03/13/2017     Lab Results   Component Value Date    CR 0.88 05/24/2019       GFR Estimate   Date Value Ref Range Status   05/24/2019 >90 >60  "mL/min/[1.73_m2] Final     Comment:     Non  GFR Calc  Starting 12/18/2018, serum creatinine based estimated GFR (eGFR) will be   calculated using the Chronic Kidney Disease Epidemiology Collaboration   (CKD-EPI) equation.     04/18/2018 >90 >60 mL/min/1.7m2 Final     Comment:     Non  GFR Calc   03/13/2017 >90  Non  GFR Calc   >60 mL/min/1.7m2 Final     GFR Estimate If Black   Date Value Ref Range Status   05/24/2019 >90 >60 mL/min/[1.73_m2] Final     Comment:      GFR Calc  Starting 12/18/2018, serum creatinine based estimated GFR (eGFR) will be   calculated using the Chronic Kidney Disease Epidemiology Collaboration   (CKD-EPI) equation.     04/18/2018 >90 >60 mL/min/1.7m2 Final     Comment:      GFR Calc   03/13/2017 >90   GFR Calc   >60 mL/min/1.7m2 Final       Lab Results   Component Value Date    MICROL <5 05/24/2019     No results found for: MICROALBUMIN  Lab Results   Component Value Date    CPEPT 2.3 11/19/2009    GADAB >250.0 (H) 11/19/2009    ISCAB  09/08/2009     1:16  Reference range: <1:4  (Note)  TEST INFORMATION: Islet Cell Ab, IgG  Islet cell antibodies have been associated with  \"autoimmune\" endocrine disorders and insulin-dependent  diabetes. This disorder is characterized by the presence  of antibodies in patients that may be detected years  before the onset of the clinical symptoms. To calculate  Juvenile Diabetes Foundation (JDF) units: multiply the  titer x 5 (1:8  8 x 5 = 40 JDF Units).  Performed by [facility],  [address] [place] [id number] [phone]  [email or web address] Evelia Miles MD, Lab. Director       No results found for: B12]    Most recent eye exam date: : Not Found     Eye exam in January, 2019- no retinopathy- due for exam.    Assessment/Plan:     1.  Type 1 diabetes- Franklin's glucose control has improved.  He is struggling with having to raise his glucose over 200 mg/dL " in order to exercise, and even then, his glucose tends to drop very quickly.  He has managed to work around this, however it is inconvenient.  We again discussed pump technology and advances that have come in the last few months with hybrid closed loop technology.  This could allow for more flexibility with activity and less risk for hypoglycemia.  At this point, he is not interested in a pump.   We also talked about the option of using an In-pen for dosing calculations and for seeing his active insulin on board on an vikash.  He may be interested in this idea.   He will learn more about it and let me know if he would like to order it.  In the meantime, he feels comfortable with his current regimen and prefers not to make any changes today.  Discussed COVID-19 precautions and increased incidence of DKA.  Will send rx for ketone strips.   The American Diabetes Association has issued a nice video with checklist for patients with diabetes: https://www.diabetes.org/diabetes/treatment-care/planning-sick-days/coronavirus    2.  Risk factors-     Retinopathy:  No.  Had eye exam last year. Will schedule.   Nephropathy:  BP historically well controlled. No microalbuminuria.  Creatinine stable.   Neuropathy: No.    Feet: OK, no ulcers.   Lipids:  LDL at target.  Patient taking statin  Celiac screening: negative 2018  Will check labs at his convenience.      3.  Positive TPO antibodies- will check TSH.   He has been having some chest tightness unrelated to exertion, but does not seem to be palpitations.   Asked him to keep a log of symptoms and f/u with PCP if they persist.     4. F/U in 3 months with Dr. Wilson, 6 months with me.      Liz Conroy PA-C, MPAS   [facility]  Department of Medicine  Division of Endocrinology and Diabetes

## 2020-06-16 NOTE — PATIENT INSTRUCTIONS
Glucose control is much better!  Keep up the great work!    Consider changing carb ratio to 1/15g with breakfast to prevent the post-breakfast rise in glucose.     InPen (bluetooth insulin pen with dosing calculations).  When you start running low on your Novolog pens, let me know if you would like me to order the In-pen and refillable cartridges.     Consider insulin pumps:  Tandem with control IQ  Omnipod (tubeless pump  Medtronic     The American diabetes association has issued a nice video with checklist for patients with diabetes:     https://www.diabetes.org/diabetes/treatment-care/planning-sick-days/coronavirus

## 2020-07-02 ENCOUNTER — MYC MEDICAL ADVICE (OUTPATIENT)
Dept: ENDOCRINOLOGY | Facility: CLINIC | Age: 50
End: 2020-07-02

## 2020-07-02 DIAGNOSIS — E10.9 WELL CONTROLLED TYPE 1 DIABETES MELLITUS (H): Primary | ICD-10-CM

## 2020-07-02 RX ORDER — BLOOD KETONE TEST, STRIPS
STRIP MISCELLANEOUS
Qty: 25 EACH | Refills: 3 | Status: SHIPPED | OUTPATIENT
Start: 2020-07-02

## 2020-07-03 DIAGNOSIS — E78.2 MIXED HYPERLIPIDEMIA: ICD-10-CM

## 2020-07-03 DIAGNOSIS — E10.9 WELL CONTROLLED TYPE 1 DIABETES MELLITUS (H): ICD-10-CM

## 2020-07-03 DIAGNOSIS — E10.9 TYPE 1 DIABETES MELLITUS WITHOUT COMPLICATION (H): ICD-10-CM

## 2020-07-03 LAB
BASOPHILS # BLD AUTO: 0 10E9/L (ref 0–0.2)
BASOPHILS NFR BLD AUTO: 0.4 %
CHOLEST SERPL-MCNC: 136 MG/DL
CREAT SERPL-MCNC: 0.98 MG/DL (ref 0.66–1.25)
CREAT UR-MCNC: 242 MG/DL
DIFFERENTIAL METHOD BLD: NORMAL
EOSINOPHIL # BLD AUTO: 0.1 10E9/L (ref 0–0.7)
EOSINOPHIL NFR BLD AUTO: 2.5 %
ERYTHROCYTE [DISTWIDTH] IN BLOOD BY AUTOMATED COUNT: 13.1 % (ref 10–15)
GFR SERPL CREATININE-BSD FRML MDRD: >90 ML/MIN/{1.73_M2}
HBA1C MFR BLD: 6.8 % (ref 0–5.6)
HCT VFR BLD AUTO: 40.1 % (ref 40–53)
HDLC SERPL-MCNC: 55 MG/DL
HGB BLD-MCNC: 13.8 G/DL (ref 13.3–17.7)
LDLC SERPL CALC-MCNC: 71 MG/DL
LYMPHOCYTES # BLD AUTO: 1.7 10E9/L (ref 0.8–5.3)
LYMPHOCYTES NFR BLD AUTO: 31.4 %
MCH RBC QN AUTO: 27.2 PG (ref 26.5–33)
MCHC RBC AUTO-ENTMCNC: 34.4 G/DL (ref 31.5–36.5)
MCV RBC AUTO: 79 FL (ref 78–100)
MICROALBUMIN UR-MCNC: 9 MG/L
MICROALBUMIN/CREAT UR: 3.73 MG/G CR (ref 0–17)
MONOCYTES # BLD AUTO: 0.7 10E9/L (ref 0–1.3)
MONOCYTES NFR BLD AUTO: 11.7 %
NEUTROPHILS # BLD AUTO: 3 10E9/L (ref 1.6–8.3)
NEUTROPHILS NFR BLD AUTO: 54 %
NONHDLC SERPL-MCNC: 81 MG/DL
PLATELET # BLD AUTO: 211 10E9/L (ref 150–450)
RBC # BLD AUTO: 5.07 10E12/L (ref 4.4–5.9)
TRIGL SERPL-MCNC: 51 MG/DL
TSH SERPL DL<=0.005 MIU/L-ACNC: 2.72 MU/L (ref 0.4–4)
TSH SERPL DL<=0.005 MIU/L-ACNC: 2.72 MU/L (ref 0.4–4)
WBC # BLD AUTO: 5.6 10E9/L (ref 4–11)

## 2020-07-03 PROCEDURE — 82565 ASSAY OF CREATININE: CPT | Performed by: FAMILY MEDICINE

## 2020-07-03 PROCEDURE — 83036 HEMOGLOBIN GLYCOSYLATED A1C: CPT | Performed by: FAMILY MEDICINE

## 2020-07-03 PROCEDURE — 36415 COLL VENOUS BLD VENIPUNCTURE: CPT | Performed by: FAMILY MEDICINE

## 2020-07-03 PROCEDURE — 82043 UR ALBUMIN QUANTITATIVE: CPT | Performed by: INTERNAL MEDICINE

## 2020-07-03 PROCEDURE — 85025 COMPLETE CBC W/AUTO DIFF WBC: CPT | Performed by: FAMILY MEDICINE

## 2020-07-03 PROCEDURE — 80061 LIPID PANEL: CPT | Performed by: FAMILY MEDICINE

## 2020-07-03 PROCEDURE — 84443 ASSAY THYROID STIM HORMONE: CPT | Performed by: FAMILY MEDICINE

## 2020-07-06 ENCOUNTER — TELEPHONE (OUTPATIENT)
Dept: ENDOCRINOLOGY | Facility: CLINIC | Age: 50
End: 2020-07-06

## 2020-07-06 DIAGNOSIS — E10.9 WELL CONTROLLED TYPE 1 DIABETES MELLITUS (H): Primary | ICD-10-CM

## 2020-07-06 NOTE — TELEPHONE ENCOUNTER
[facility] Call Center    Phone Message    May a detailed message be left on voicemail: yes     Reason for Call: Medication Question or concern regarding medication   Prescription Clarification  Name of Medication: PRECISION XTRA KETONE STRP  Prescribing Provider: Liz Conroy    Pharmacy: [facility], [place] - [address] [place] [address].   What on the order needs clarification? Per Pharmacist is wanting to get a call back in regards to letting Rafiq know this medication requires to have a meter and wanting to know If this is something that Rafiq is still wanting for Patient. Per Deedee states wanting to know if maybe the Urine strips would be better for Patient as they are also cheaper. Please advise.           Action Taken: Message routed to:  [facility] ([facility] Endo     Travel Screening: Not Applicable

## 2020-07-29 ENCOUNTER — MYC REFILL (OUTPATIENT)
Dept: ENDOCRINOLOGY | Facility: CLINIC | Age: 50
End: 2020-07-29

## 2020-07-29 DIAGNOSIS — E10.8 TYPE 1 DIABETES MELLITUS WITH COMPLICATION (H): ICD-10-CM

## 2020-07-29 DIAGNOSIS — E10.9 TYPE 1 DIABETES MELLITUS (H): ICD-10-CM

## 2020-07-30 RX ORDER — INSULIN ASPART 100 [IU]/ML
INJECTION, SOLUTION INTRAVENOUS; SUBCUTANEOUS
Qty: 45 ML | Refills: 3 | Status: SHIPPED | OUTPATIENT
Start: 2020-07-30 | End: 2022-01-03

## 2020-07-30 NOTE — TELEPHONE ENCOUNTER
NOVOLOG PENFILL 100 UNIT/ML soln   Last Written Prescription Date:  6/21/19  Last Fill Quantity: 45ml,   # refills: 3  Last Office Visit :6/15/20  Rafiq paredes  Future Office visit:  9/15/20  Steve Paredes      Routing refill request to provider for review/approval because:  endo triage to fill

## 2020-07-30 NOTE — TELEPHONE ENCOUNTER
6/15/2020  [facility] Endocrinology   Liz Conroy PA-C   Physician Assistant  [place]:  9/15/20    Test strips

## 2020-08-25 DIAGNOSIS — E10.65 TYPE 1 DIABETES MELLITUS WITH HYPERGLYCEMIA (H): ICD-10-CM

## 2020-08-31 ENCOUNTER — MYC REFILL (OUTPATIENT)
Dept: ENDOCRINOLOGY | Facility: CLINIC | Age: 50
End: 2020-08-31

## 2020-08-31 DIAGNOSIS — E10.9 TYPE 1 DIABETES MELLITUS WITHOUT COMPLICATION (H): ICD-10-CM

## 2020-08-31 RX ORDER — FLURBIPROFEN SODIUM 0.3 MG/ML
SOLUTION/ DROPS OPHTHALMIC
Qty: 500 EACH | Refills: 2 | Status: SHIPPED | OUTPATIENT
Start: 2020-08-31 | End: 2021-04-23

## 2020-09-14 NOTE — PROGRESS NOTES
"Franklin Muir is a 50 year old male who is being evaluated via a billable video visit.    Patients Glucose Data was Sent via Email    The patient has been notified of following:     \"This video visit will be conducted via a call between you and your physician/provider. We have found that certain health care needs can be provided without the need for an in-person physical exam.  This service lets us provide the care you need with a video conversation.  If a prescription is necessary we can send it directly to your pharmacy.  If lab work is needed we can place an order for that and you can then stop by our lab to have the test done at a later time.    Video visits are billed at different rates depending on your insurance coverage.  Please reach out to your insurance provider with any questions.    If during the course of the call the physician/provider feels a video visit is not appropriate, you will not be charged for this service.\"    Patient has given verbal consent for Video visit? Yes  How would you like to obtain your AVS? MyChart  If you are dropped from the video visit, the video invite should be resent to: Text to cell phone: 6 [phone]   Will anyone else be joining your video visit? No      Video-Visit Details    Type of service:  Video Visit    Video Start Time: 9:00 AM (med student) 9:35 (MD)  Video End Time: 10:02  Total time with MD: 27 min     Originating Location (pt. Location): Home    Distant Location (provider location):  [facility] ENDOCRINOLOGY     Platform used for Video Visit: [facility]      Due to the COVID 19 pandemic this visit was a video visit in order to help prevent spread of infection in this high risk patient and the general population.     This visit would have been billed as 15808 as an E & M code      Franklin Muir MRN:[id number] YOB: 1970  Primary care provider: Ruddy Malave     HPI:  Franklin Muir is a 50 year old male with a PMHx of type 1 DM, diagnosed 10 " years ago here for follow up on type 1 DM.        He is on Tresiba 9 units daily at 7 AM. Novolog 1 U /20 g of CHO with meals and snacks and premeals correction @ 0.5 U per 30 mg/Ll above 150 mg/dL during the day and over 200 mg/dL HS.      He feels things are going well for him now. Does note continued highs after breakfast. Normally takes his Novolog a couple minutes before breakfast. Normally has a granola bar, and a banana with coffee for breakfast.  He has infrequent lows, most often occur on days in which he exercises in the evening.  He is symptomatic when blood glucose values are below 70.     He denies any numbness, tingling, or vision changes.      He is wearing Dexcom G6 sensor with overall average of 152   45 mg/dl over the last month.   He is been 67% in target range. 1% low, <1% very low, 28% high, 3% very high.  Patient develops hypoglycemic symptoms when blood glucose levels are below 70 mg/dL.  A1C from 7/3/20 was 6.8%.  GMI is 6.9%  Highest blood glucose peak is at breakfast and takes it about 4 hours to return to preprandial blood glucose levels    Dexcom alert settings are as follows:  Low 70 mg/dL  Urgent low 55 mg/dL, repeat 30 minutes  High 350 mg/dL  Full and rise rate 3 mg/dL/min    He feels that he has been exercising more with the COVID-19 pandemic.  He usually exercises right before or soon after dinner.  He is following the CDC recommendations on prevention of COVID-19.  He is wondering what is the risk associated with his diagnosis of type 1 diabetes in relation to COVID-19.     Franklin also has a history of Hashimoto's Ab positivity with normal function, has never been on levothyroxine replacement therapy.   He denies any symptoms suggestive of hypo-or hyperthyroidism.  No constipation, fatigue, or swelling.     Review Of Systems  12 point ROS negative unless noted above    Social Hx:   . Has a young son, in 4th grade. Works as a  at the [employer].  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     Past Medical History  Past Medical History:   Diagnosis Date     Chronic lymphocytic thyroiditis 2011     Chronic lymphocytic thyroiditis 2011     Chronic lymphocytic thyroiditis      Depressive disorder 1997    Treated for a couple of years; not currently experiencing     Diabetes 2009       Past Surgical History  Past Surgical History:   Procedure Laterality Date     HC TOOTH EXTRACTION W/FORCEP       VASECTOMY  2019      Family History  Family History   Problem Relation Age of Onset     Hyperlipidemia Mother      Diabetes Brother      Hypertension Paternal Grandfather      Cerebrovascular Disease Paternal Grandfather      Hypertension Maternal Grandfather      Hyperlipidemia Maternal Grandfather      Cerebrovascular Disease Maternal Grandfather      Breast Cancer Maternal Aunt      Diabetes Brother      Anxiety Disorder Brother      Coronary Artery Disease Other         Maternal great grandfather     Hypertension Maternal Grandmother      Hyperlipidemia Maternal Grandmother      Hyperlipidemia Brother      Cerebrovascular Disease Paternal Grandmother      Asthma No family hx of      C.A.D. No family hx of      Cancer - colorectal No family hx of      Prostate Cancer No family hx of      Brother had T1D  at age 26 from DKA  Children: 1 son, 9 years old, healthy    Medications  Current Outpatient Medications   Medication Sig Dispense Refill     ACE/ARB NOT PRESCRIBED, INTENTIONAL, by Other route continuous prn.       acetone urine (KETOSTIX) test strip Use as directed in case of high blood sugar or illness. 25 each 3     atorvastatin (LIPITOR) 10 MG tablet Take 1 tablet (10 mg) by mouth daily 90 tablet 4     blood glucose (NO BRAND SPECIFIED) test strip Use to test blood sugar 1 times daily or as directed. 100 strip 3     blood glucose monitoring (NO BRAND SPECIFIED) meter device kit Use to test blood sugar 1 times daily or as directed. 1  kit 1     Continuous Blood Gluc Sensor (DEXCOM G6 SENSOR) MISC 1 each every 10 days 10 each 3     Continuous Blood Gluc Transmit (DEXCOM G6 TRANSMITTER) MISC 1 each every 3 months 1 each 3     glucagon (GLUCAGON EMERGENCY) 1 MG kit To treat very low blood sugar in an emergency Use as directed 2 each 2     Glucagon 3 MG/DOSE POWD Spray 3 mg in nostril as needed (Hypoglycemia) 3 each 3     insulin degludec (TRESIBA FLEXTOUCH) 100 UNIT/ML pen Inject 9 units subcutaneously daily 9 mL 3     insulin pen needle (B-D U/F) 31G X 5 MM miscellaneous Use 5 daily or as directed. 500 each 2     ketoconazole (NIZORAL) 2 % external shampoo   10     loratadine (CLARITIN) 10 MG tablet        NOVOLOG PENFILL 100 UNIT/ML soln 1 unit:15  grams of CHO with meals and snacks, and for pre-meal correction, approx 40 units daily 45 mL 3     PRECISION XTRA KETONE STRP Use as directed in case of high blood sugar or illness. 25 each 3     Sodium Fluoride (CLINPRO 5000) 1.1 % PSTE Apply  to affected area. Use once every night       VITAMIN D, CHOLECALCIFEROL, PO Take 1,000 Units by mouth daily         Physical Examination:  GENERAL: Healthy, alert and no distress  EYES: Eyes grossly normal to inspection.  No discharge or erythema, or obvious scleral/conjunctival abnormalities.  Wears glasses  HENT: Normal cephalic/atraumatic.  External ears, nose and mouth without ulcers or lesions.  No nasal drainage visible.  NECK: No asymmetry, visible masses or scars  RESP: No audible wheeze, cough, or visible cyanosis.  No visible retractions or increased work of breathing.    MS: No gross musculoskeletal defects noted.  Normal range of motion.  No visible edema.  SKIN: Visible skin clear. No significant rash, abnormal pigmentation or lesions.  NEURO: Cranial nerves grossly intact.  Mentation and speech appropriate for age.  PSYCH: Mentation appears normal, affect normal/bright, judgement and insight intact, normal speech and appearance well-groomed.  .      Endocrine Labs:  Lab Results   Component Value Date    A1C 6.8 07/03/2020    A1C 8.4 03/13/2017    A1C 7.6 02/18/2016    A1C 7.8 07/07/2015    A1C 6.9 12/03/2013     TSH   Date Value Ref Range Status   07/03/2020 2.72 0.40 - 4.00 mU/L Final   07/03/2020 2.72 0.40 - 4.00 mU/L Final             Assessment and Plan:   Franklin Muir is a 50 y.o. male who has a 10 years history of type 1 DM and he was seen today for follow-up.      1. Type 1 diabetes mellitus without complication (H)  Blood glucose levels remain well controlled, aside from increase blood glucose levels after breakfast. Most recent A1c 6.8%, GMI 6.9%. Discussed recommendation to have Novolog 20 minutes before breakfast to help reduce mid morning spikes. Also recommended the patient to try a different fruit instead of a banana, as this could be contributing to the mid morning spike. Advised patient to adjust his carb coverage at dinner during days when he exercises. Patient is agreeable with this plan.      2.  Chronic complications of diabetes  Diabetes Care:  Retinopathy:  None.  Had eye exam within last year, next eye exam planned for December/January   Nephropathy:  BP well controlled. No microalbuminuria.  Creatinine stable.   Neuropathy: No.      3.  Cardiovascular risk factors  Lipids:  LDL at target.  Patient taking statin  BP: Patient's blood pressure has been normal in the past, last recorded in March was 127/83.      4.  Positive TPO antibodies  TSH was normal 7/2020. No further work up needed today.  We will check TSH once a year or sooner if the patient develops symptoms    5.  COVID-19 pandemic  The patient will continue to follow the CDC recommendations on prevention and mitigation of COVID-19    Return to clinic in 3 months    Kishore Sanchez, MS4, served as a scribe for Dr. Steve Almonte Placed This Encounter   Procedures     Continuous Glucose Monitoring >=72 hours PHYS INTERP     Physician Attestation   YESSENIA DAILY  Steve, was present with the medical student who participated in the service and in the documentation of the note.  I have verified the history, reviewed the CGM as data, and performed the physical exam and medical decision making.  I agree with the assessment and plan of care as documented in the note.      Items personally reviewed: vitals, labs and agree with the interpretation documented in the note.    MD Freya Carrion MD PhD    Division of Endocrinology and Diabetes

## 2020-09-15 ENCOUNTER — VIRTUAL VISIT (OUTPATIENT)
Dept: ENDOCRINOLOGY | Facility: CLINIC | Age: 50
End: 2020-09-15
Payer: COMMERCIAL

## 2020-09-15 DIAGNOSIS — E10.9 TYPE 1 DIABETES MELLITUS WITHOUT COMPLICATION (H): Primary | ICD-10-CM

## 2020-09-15 NOTE — LETTER
"9/15/2020       RE: Franklin Muir  [address]  [place] [address]     Dear Colleague,    Thank you for referring your patient, Franklin Muir, to the [facility] ENDOCRINOLOGY at [facility]. Please see a copy of my visit note below.    Franklin Muir is a 50 year old male who is being evaluated via a billable video visit.    Patients Glucose Data was Sent via Email    The patient has been notified of following:     \"This video visit will be conducted via a call between you and your physician/provider. We have found that certain health care needs can be provided without the need for an in-person physical exam.  This service lets us provide the care you need with a video conversation.  If a prescription is necessary we can send it directly to your pharmacy.  If lab work is needed we can place an order for that and you can then stop by our lab to have the test done at a later time.    Video visits are billed at different rates depending on your insurance coverage.  Please reach out to your insurance provider with any questions.    If during the course of the call the physician/provider feels a video visit is not appropriate, you will not be charged for this service.\"    Patient has given verbal consent for Video visit? Yes  How would you like to obtain your AVS? MyChart  If you are dropped from the video visit, the video invite should be resent to: Text to cell phone: 6 [phone]   Will anyone else be joining your video visit? No      Video-Visit Details    Type of service:  Video Visit    Video Start Time: 9:00 AM (med student) 9:35 (MD)  Video End Time: 10:02  Total time with MD: 27 min     Originating Location (pt. Location): Home    Distant Location (provider location):  [facility] ENDOCRINOLOGY     Platform used for Video Visit: [employer]      Due to the COVID 19 pandemic this visit was a video visit in order to help prevent spread of infection in this high risk " patient and the general population.     This visit would have been billed as 75262 as an E & M code      Franklin Muir MRN:[id number] YOB: 1970  Primary care provider: Ruddy Malave     HPI:  Franklin Muir is a 50 year old male with a PMHx of type 1 DM, diagnosed 10 years ago here for follow up on type 1 DM.        He is on Tresiba 9 units daily at 7 AM. Novolog 1 U /20 g of CHO with meals and snacks and premeals correction @ 0.5 U per 30 mg/Ll above 150 mg/dL during the day and over 200 mg/dL HS.      He feels things are going well for him now. Does note continued highs after breakfast. Normally takes his Novolog a couple minutes before breakfast. Normally has a granola bar, and a banana with coffee for breakfast.  He has infrequent lows, most often occur on days in which he exercises in the evening.  He is symptomatic when blood glucose values are below 70.     He denies any numbness, tingling, or vision changes.      He is wearing Dexcom G6 sensor with overall average of 152   45 mg/dl over the last month.   He is been 67% in target range. 1% low, <1% very low, 28% high, 3% very high.  Patient develops hypoglycemic symptoms when blood glucose levels are below 70 mg/dL.  A1C from 7/3/20 was 6.8%.  GMI is 6.9%  Highest blood glucose peak is at breakfast and takes it about 4 hours to return to preprandial blood glucose levels    Dexcom alert settings are as follows:  Low 70 mg/dL  Urgent low 55 mg/dL, repeat 30 minutes  High 350 mg/dL  Full and rise rate 3 mg/dL/min    He feels that he has been exercising more with the COVID-19 pandemic.  He usually exercises right before or soon after dinner.  He is following the CDC recommendations on prevention of COVID-19.  He is wondering what is the risk associated with his diagnosis of type 1 diabetes in relation to COVID-19.     Franklin also has a history of Hashimoto's Ab positivity with normal function, has never been on levothyroxine replacement  therapy.   He denies any symptoms suggestive of hypo-or hyperthyroidism.  No constipation, fatigue, or swelling.     Review Of Systems  12 point ROS negative unless noted above    Social Hx:   . Has a young son, in 4th grade. 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     Past Medical History  Past Medical History:   Diagnosis Date     Chronic lymphocytic thyroiditis 2011     Chronic lymphocytic thyroiditis 2011     Chronic lymphocytic thyroiditis      Depressive disorder 1997    Treated for a couple of years; not currently experiencing     Diabetes 2009       Past Surgical History  Past Surgical History:   Procedure Laterality Date     HC TOOTH EXTRACTION W/FORCEP       VASECTOMY  2019      Family History  Family History   Problem Relation Age of Onset     Hyperlipidemia Mother      Diabetes Brother      Hypertension Paternal Grandfather      Cerebrovascular Disease Paternal Grandfather      Hypertension Maternal Grandfather      Hyperlipidemia Maternal Grandfather      Cerebrovascular Disease Maternal Grandfather      Breast Cancer Maternal Aunt      Diabetes Brother      Anxiety Disorder Brother      Coronary Artery Disease Other         Maternal great grandfather     Hypertension Maternal Grandmother      Hyperlipidemia Maternal Grandmother      Hyperlipidemia Brother      Cerebrovascular Disease Paternal Grandmother      Asthma No family hx of      C.A.D. No family hx of      Cancer - colorectal No family hx of      Prostate Cancer No family hx of      Brother had T1D  at age 26 from DKA  Children: 1 son, 9 years old, healthy    Medications  Current Outpatient Medications   Medication Sig Dispense Refill     ACE/ARB NOT PRESCRIBED, INTENTIONAL, by Other route continuous prn.       acetone urine (KETOSTIX) test strip Use as directed in case of high blood sugar or illness. 25 each 3     atorvastatin (LIPITOR) 10 MG  tablet Take 1 tablet (10 mg) by mouth daily 90 tablet 4     blood glucose (NO BRAND SPECIFIED) test strip Use to test blood sugar 1 times daily or as directed. 100 strip 3     blood glucose monitoring (NO BRAND SPECIFIED) meter device kit Use to test blood sugar 1 times daily or as directed. 1 kit 1     Continuous Blood Gluc Sensor (DEXCOM G6 SENSOR) MISC 1 each every 10 days 10 each 3     Continuous Blood Gluc Transmit (DEXCOM G6 TRANSMITTER) MISC 1 each every 3 months 1 each 3     glucagon (GLUCAGON EMERGENCY) 1 MG kit To treat very low blood sugar in an emergency Use as directed 2 each 2     Glucagon 3 MG/DOSE POWD Spray 3 mg in nostril as needed (Hypoglycemia) 3 each 3     insulin degludec (TRESIBA FLEXTOUCH) 100 UNIT/ML pen Inject 9 units subcutaneously daily 9 mL 3     insulin pen needle (B-D U/F) 31G X 5 MM miscellaneous Use 5 daily or as directed. 500 each 2     ketoconazole (NIZORAL) 2 % external shampoo   10     loratadine (CLARITIN) 10 MG tablet        NOVOLOG PENFILL 100 UNIT/ML soln 1 unit:15  grams of CHO with meals and snacks, and for pre-meal correction, approx 40 units daily 45 mL 3     PRECISION XTRA KETONE STRP Use as directed in case of high blood sugar or illness. 25 each 3     Sodium Fluoride (CLINPRO 5000) 1.1 % PSTE Apply  to affected area. Use once every night       VITAMIN D, CHOLECALCIFEROL, PO Take 1,000 Units by mouth daily         Physical Examination:  GENERAL: Healthy, alert and no distress  EYES: Eyes grossly normal to inspection.  No discharge or erythema, or obvious scleral/conjunctival abnormalities.  Wears glasses  HENT: Normal cephalic/atraumatic.  External ears, nose and mouth without ulcers or lesions.  No nasal drainage visible.  NECK: No asymmetry, visible masses or scars  RESP: No audible wheeze, cough, or visible cyanosis.  No visible retractions or increased work of breathing.    MS: No gross musculoskeletal defects noted.  Normal range of motion.  No visible  edema.  SKIN: Visible skin clear. No significant rash, abnormal pigmentation or lesions.  NEURO: Cranial nerves grossly intact.  Mentation and speech appropriate for age.  PSYCH: Mentation appears normal, affect normal/bright, judgement and insight intact, normal speech and appearance well-groomed.  .     Endocrine Labs:  Lab Results   Component Value Date    A1C 6.8 07/03/2020    A1C 8.4 03/13/2017    A1C 7.6 02/18/2016    A1C 7.8 07/07/2015    A1C 6.9 12/03/2013     TSH   Date Value Ref Range Status   07/03/2020 2.72 0.40 - 4.00 mU/L Final   07/03/2020 2.72 0.40 - 4.00 mU/L Final             Assessment and Plan:   Franklin Muir is a 50 y.o. male who has a 10 years history of type 1 DM and he was seen today for follow-up.      1. Type 1 diabetes mellitus without complication (H)  Blood glucose levels remain well controlled, aside from increase blood glucose levels after breakfast. Most recent A1c 6.8%, GMI 6.9%. Discussed recommendation to have Novolog 20 minutes before breakfast to help reduce mid morning spikes. Also recommended the patient to try a different fruit instead of a banana, as this could be contributing to the mid morning spike. Advised patient to adjust his carb coverage at dinner during days when he exercises. Patient is agreeable with this plan.      2.  Chronic complications of diabetes  Diabetes Care:  Retinopathy:  None.  Had eye exam within last year, next eye exam planned for December/January   Nephropathy:  BP well controlled. No microalbuminuria.  Creatinine stable.   Neuropathy: No.      3.  Cardiovascular risk factors  Lipids:  LDL at target.  Patient taking statin  BP: Patient's blood pressure has been normal in the past, last recorded in March was 127/83.      4.  Positive TPO antibodies  TSH was normal 7/2020. No further work up needed today.  We will check TSH once a year or sooner if the patient develops symptoms    5.  COVID-19 pandemic  The patient will continue to follow the CDC  recommendations on prevention and mitigation of COVID-19    Return to clinic in 3 months    Kishore Sanchez, MS4, served as a scribe for Dr. Wilson    Orders Placed This Encounter   Procedures     Continuous Glucose Monitoring >=72 hours PHYS INTERP     Physician Attestation   I, YESSENIA Wilson, was present with the medical student who participated in the service and in the documentation of the note.  I have verified the history, reviewed the CGM as data, and performed the physical exam and medical decision making.  I agree with the assessment and plan of care as documented in the note.      Items personally reviewed: vitals, labs and agree with the interpretation documented in the note.    MD Freya Carrion MD PhD    Division of Endocrinology and Diabetes

## 2020-09-15 NOTE — PATIENT INSTRUCTIONS
-Consider taking NovoLog 15 minutes prior to breakfast  -Consider trying a food other than banana at breakfast  -Continue to follow the CDC recommendations for prevention and mitigation of COVID-19  It was a pleasure to see you at today's visit.  Please let me know if you have any questions or concerns.  Sincerely,    Freya Wilson MD PhD    Division of Endocrinology and Diabetes

## 2020-11-13 ENCOUNTER — DOCUMENTATION ONLY (OUTPATIENT)
Dept: CARE COORDINATION | Facility: CLINIC | Age: 50
End: 2020-11-13

## 2020-11-29 ENCOUNTER — HEALTH MAINTENANCE LETTER (OUTPATIENT)
Age: 50
End: 2020-11-29

## 2020-12-14 ENCOUNTER — VIRTUAL VISIT (OUTPATIENT)
Dept: ENDOCRINOLOGY | Facility: CLINIC | Age: 50
End: 2020-12-14
Payer: COMMERCIAL

## 2020-12-14 DIAGNOSIS — E10.9 WELL CONTROLLED TYPE 1 DIABETES MELLITUS (H): Primary | ICD-10-CM

## 2020-12-14 PROCEDURE — 99214 OFFICE O/P EST MOD 30 MIN: CPT | Mod: 95 | Performed by: PHYSICIAN ASSISTANT

## 2020-12-14 NOTE — PROGRESS NOTES
"Outcome for 12/14/20 9:02 AM :Left Voicemail for patient to call back   Outcome for 12/14/20 1:17 PM :Left Voicemail for patient to call back    Patients Glucose Data is on dexcom     Franklin Muir is a 50 year old male who is being evaluated via a billable video visit.      The patient has been notified of following:     \"This video visit will be conducted via a call between you and your physician/provider. We have found that certain health care needs can be provided without the need for an in-person physical exam.  This service lets us provide the care you need with a video conversation.  If a prescription is necessary we can send it directly to your pharmacy.  If lab work is needed we can place an order for that and you can then stop by our lab to have the test done at a later time.    Video visits are billed at different rates depending on your insurance coverage.  Please reach out to your insurance provider with any questions.    If during the course of the call the physician/provider feels a video visit is not appropriate, you will not be charged for this service.\"    Patient has given verbal consent for Video visit? Yes    Will anyone else be joining your video visit? No        Video-Visit Details    Type of service:  Video Visit    Video Start Time: 1443  Video End Time: 1508    Originating Location (pt. Location):home    Distant Location (provider location):  [facility]     Platform used for Video Visit: Kanjoya    HPI:   Franklin is a 51 yo man here for follow up of well controlled type 1 diabetes.  He also sees Dr. Wilson.  He reports that he has been doing well and has remained healthy through the pandemic.  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  He has been doing more running after work and is really enjoying this, however he has developed plantar fasciitis, which has been bothersome.  He manages to avoid hypoglycemia while running by consuming carbs before his run to " increase his glucose over 200 mg/dL.  He has been running up to 5 miles at a time and has not had severe hypoglycemia.     Franklin is currently on  tresiba 9 units daily and Novolog 1 unit/20 grams of CHO with meals and snacks, and for pre-meal correction @ 1/2 U per 30 mg/dL above 150 during the day and over 200 at HS.    Franklin wears a Dexcom G6 sensor with overall average of 156 mg/dL for the past two weeks.  CV 26%        He really likes having a sensor and feels it helps him manage his diabetes better.  He has never worn a pump.  He does have strong symptoms alerting him to hypoglycemia.  His brother also had type 1 diabetes and  of DKA.      Franklin also has a history of Hashimoto's ab positivity with normal function, has never been on levothyroxine replacement therapy. Thyroid function has remained normal.     Franklin has questions about COVID 19 vaccines and his risks associated with having diabetes. He has been very careful and has been physically distancing.      He generally has been feeling well and has no concerns today.       Past Medical History:   Diagnosis Date     Chronic lymphocytic thyroiditis 2011     Chronic lymphocytic thyroiditis 2011     Chronic lymphocytic thyroiditis      Depressive disorder 1997    Treated for a couple of years; not currently experiencing     Diabetes 2009       Past Surgical History:   Procedure Laterality Date     HC TOOTH EXTRACTION W/FORCEP       VASECTOMY  2019       Family History   Problem Relation Age of Onset     Hyperlipidemia Mother      Diabetes Brother      Hypertension Paternal Grandfather      Cerebrovascular Disease Paternal Grandfather      Hypertension Maternal Grandfather      Hyperlipidemia Maternal Grandfather      Cerebrovascular Disease Maternal Grandfather      Breast Cancer Maternal Aunt      Diabetes Brother      Anxiety Disorder Brother      Coronary Artery Disease Other         Maternal great grandfather      Hypertension Maternal Grandmother      Hyperlipidemia Maternal Grandmother      Hyperlipidemia Brother      Cerebrovascular Disease Paternal Grandmother      Asthma No family hx of      C.A.D. No family hx of      Cancer - colorectal No family hx of      Prostate Cancer No family hx of        Social History     Social History     Marital status:      Spouse name: N/A     Number of children: N/A     Years of education: N/A     Social History Main Topics     Smoking status: Never Smoker     Smokeless tobacco: Never Used     Alcohol use Yes      Comment: ~ 1 drink/day     Drug use: No     Sexual activity: Yes     Partners: Female     Birth control/ protection: Condom, Natural Family Planning      Comment: Would like to talk about vasectomy     Other Topics Concern     Parent/Sibling W/ Cabg, Mi Or Angioplasty Before 65f 55m? No     Social History Narrative   Social Hx:   .  Has a son born in 2011.  Works a  at the [employer]. Now . Physically active- running in the nice weather and playing soccer with his son.    Current Outpatient Medications   Medication     ACE/ARB NOT PRESCRIBED, INTENTIONAL,     acetone urine (KETOSTIX) test strip     atorvastatin (LIPITOR) 10 MG tablet     blood glucose (NO BRAND SPECIFIED) test strip     blood glucose monitoring (NO BRAND SPECIFIED) meter device kit     Continuous Blood Gluc Sensor (DEXCOM G6 SENSOR) MISC     Continuous Blood Gluc Transmit (DEXCOM G6 TRANSMITTER) MISC     glucagon (GLUCAGON EMERGENCY) 1 MG kit     Glucagon 3 MG/DOSE POWD     insulin degludec (TRESIBA FLEXTOUCH) 100 UNIT/ML pen     insulin pen needle (B-D U/F) 31G X 5 MM miscellaneous     ketoconazole (NIZORAL) 2 % external shampoo     loratadine (CLARITIN) 10 MG tablet     NOVOLOG PENFILL 100 UNIT/ML soln     PRECISION XTRA KETONE STRP     Sodium Fluoride (CLINPRO 5000) 1.1 % PSTE     VITAMIN D, CHOLECALCIFEROL, PO     No current facility-administered  medications for this visit.           Allergies   Allergen Reactions     Ceftin Rash     Seasonal Allergies      Ampicillin Rash       Physical Exam  There were no vitals taken for this visit.   GENERAL: healthy, alert and no distress  RESP: no audible wheeze, cough, or visible cyanosis.  No visible retractions or increased work of breathing.  Able to speak fully in complete sentences.  PSYCH: mentation appears normal, affect normal/bright, judgement and insight intact, normal speech and appearance well-groomed    RESULTS  Lab Results   Component Value Date    A1C 6.8 (H) 07/03/2020    A1C 8.4 (H) 03/13/2017    A1C 7.6 (H) 02/18/2016    A1C 7.8 (H) 07/07/2015    A1C 6.9 (H) 12/03/2013    HEMOGLOBINA1 6.9 (A) 03/10/2020    HEMOGLOBINA1 6.8 (A) 09/10/2019    HEMOGLOBINA1 6.6 (A) 06/03/2019    HEMOGLOBINA1 7.0 (A) 03/12/2019    HEMOGLOBINA1 7.9 (A) 11/13/2018       TSH   Date Value Ref Range Status   07/03/2020 2.72 0.40 - 4.00 mU/L Final   07/03/2020 2.72 0.40 - 4.00 mU/L Final   05/24/2019 1.89 0.40 - 4.00 mU/L Final   04/18/2018 2.20 0.40 - 4.00 mU/L Final   03/13/2017 2.81 0.40 - 4.00 mU/L Final     T4 Total   Date Value Ref Range Status   09/28/2010 9.0 5.0 - 11.0 ug/dL Final     T4 Free   Date Value Ref Range Status   02/18/2016 1.06 0.76 - 1.46 ng/dL Final   07/07/2015 1.04 0.76 - 1.46 ng/dL Final   01/05/2015 1.02 0.76 - 1.46 ng/dL Final     Comment:     Effective 7/30/2014, the reference range for this assay has changed to reflect   new instrumentation/methodology.     12/03/2013 1.08 0.70 - 1.85 ng/dL Final   01/26/2011 1.10 0.70 - 1.85 ng/dL Final       ALT   Date Value Ref Range Status   05/24/2019 25 0 - 70 U/L Final   03/13/2017 24 0 - 70 U/L Final   ]    Recent Labs   Lab Test 07/03/20  0831 05/24/19  0938 01/05/15  0933 01/05/15  0933 12/03/13  0907   CHOL 136 122   < > 134 163   HDL 55 50   < > 47 43   LDL 71 63   < > 76 108   TRIG 51 43   < > 57 57   CHOLHDLRATIO  --   --   --  2.9 3.8    < > = values  in this interval not displayed.       Lab Results   Component Value Date     03/13/2017      Lab Results   Component Value Date    POTASSIUM 4.3 03/13/2017     Lab Results   Component Value Date    CHLORIDE 102 03/13/2017     Lab Results   Component Value Date    SHANIKA 9.1 03/13/2017     Lab Results   Component Value Date    CO2 31 03/13/2017     Lab Results   Component Value Date    BUN 15 03/13/2017     Lab Results   Component Value Date    CR 0.98 07/03/2020       GFR Estimate   Date Value Ref Range Status   07/03/2020 >90 >60 mL/min/[1.73_m2] Final     Comment:     Non  GFR Calc  Starting 12/18/2018, serum creatinine based estimated GFR (eGFR) will be   calculated using the Chronic Kidney Disease Epidemiology Collaboration   (CKD-EPI) equation.     05/24/2019 >90 >60 mL/min/[1.73_m2] Final     Comment:     Non  GFR Calc  Starting 12/18/2018, serum creatinine based estimated GFR (eGFR) will be   calculated using the Chronic Kidney Disease Epidemiology Collaboration   (CKD-EPI) equation.     04/18/2018 >90 >60 mL/min/1.7m2 Final     Comment:     Non  GFR Calc     GFR Estimate If Black   Date Value Ref Range Status   07/03/2020 >90 >60 mL/min/[1.73_m2] Final     Comment:      GFR Calc  Starting 12/18/2018, serum creatinine based estimated GFR (eGFR) will be   calculated using the Chronic Kidney Disease Epidemiology Collaboration   (CKD-EPI) equation.     05/24/2019 >90 >60 mL/min/[1.73_m2] Final     Comment:      GFR Calc  Starting 12/18/2018, serum creatinine based estimated GFR (eGFR) will be   calculated using the Chronic Kidney Disease Epidemiology Collaboration   (CKD-EPI) equation.     04/18/2018 >90 >60 mL/min/1.7m2 Final     Comment:      GFR Calc       Lab Results   Component Value Date    MICROL 9 07/03/2020     No results found for: MICROALBUMIN  Lab Results   Component Value Date    CPEPT 2.3 11/19/2009  "   GADAB >250.0 (H) 11/19/2009    ISCAB  09/08/2009     1:16  Reference range: <1:4  (Note)  TEST INFORMATION: Islet Cell Ab, IgG  Islet cell antibodies have been associated with  \"autoimmune\" endocrine disorders and insulin-dependent  diabetes. This disorder is characterized by the presence  of antibodies in patients that may be detected years  before the onset of the clinical symptoms. To calculate  Juvenile Diabetes Foundation (JDF) units: multiply the  titer x 5 (1:8  8 x 5 = 40 JDF Units).  Performed by [facility],  [address] [place] [id number] [phone]  [email or web address], Evelia Miles MD, Lab. Director       No results found for: B12]    Most recent eye exam date: : Not Found     Eye exam in January, 2020- no retinopathy.     Assessment/Plan:     1.  Type 1 diabetes- Franklin's glucose control has improved and he has very little glucose variability (CV 26%).  He is managing his glucose quite well with exercise and is largely avoiding hypoglycemia.  I did discuss option of using a more rapid acting insulin, such as Fiasp to help control post-prandial glucose (he sometimes finds it challenging to take his insulin 15 minutes ahead). We made no changes today and I encouraged him to continue with his excellent glucose management.  Discussed COVID-19 precautions.   The American Diabetes Association has issued a nice video with checklist for patients with diabetes: https://www.diabetes.org/diabetes/treatment-care/planning-sick-days/coronavirus    2.  Risk factors-     Retinopathy:  No.  Had eye exam this year.  Will schedule in January, 2021.   Nephropathy:  BP historically well controlled. No microalbuminuria.  Creatinine stable.   Neuropathy: No.    Feet: OK, no ulcers. +plantar fasciitis.  Using ice/stretching/ibuprofen. Recommended good arch support.   Lipids:  LDL at target.  Patient taking statin  Celiac screening: negative 2018  Thyroid: Positive TPO antibodies- TSH normal.  Will follow.     4. " F/U in 3 months with Dr. Wilson, 6 months with me.      Liz Conroy PA-C, MPAS   [facility]  Department of Medicine  Division of Endocrinology and Diabetes

## 2020-12-14 NOTE — PATIENT INSTRUCTIONS
Nice seeing you today, Franklin!    Your glucose is very well controlled.  Keep up the great work!     Please try to focus on taking your insulin 15-20 minutes before eating.  This can smooth your post-meal glucose values.  You can also consider using a faster acting insulin, Fiasp or Lyumjev, designed to be taken at the start of the meal.      The American Diabetes Association has issued a nice video with checklist for patients with diabetes: https://www.diabetes.org/diabetes/treatment-care/planning-sick-days/coronavirus    Please refer to the [facility] website on COVID/ vaccine information. [email or web address]      We appreciate your assistance in coordinating your healthcare.     Please upload your insulin pump, blood sugar meter and/or continuous glucose monitor at home 1-2 days before your next diabetes-related appointment.   This will allow your provider to review your  data before your scheduled virtual visit.    To ask a question to your Endocrine care team, please send them a [employer] message, or reach them by phone at [phone]     To expedite your medication refill(s), please contact your pharmacy and have them   fax a refill request to: [phone].  *Please allow 3 business days for routine medication refills.  *Please allow 5 business days for controlled substance medication refills.    For after-hours urgent Endocrine issues, that do not require 911, please dial [phone], and ask to speak with the Endocrinologist On-Call

## 2020-12-14 NOTE — LETTER
"12/14/2020       RE: Franklin Muir  [address]  [place] [address]     Dear Colleague,    Thank you for referring your patient, Franklin Muir, to the [facility] ENDOCR[facility]OLOG[facility] [place] at [facility]. Please see a copy of my visit note below.    Outcome for 12/14/20 9:02 AM :Left Voicemail for patient to call back   Outcome for 12/14/20 1:17 PM :Left Voicemail for patient to call back    Patients Glucose Data is on dexcom     Franklin Muir is a 50 year old male who is being evaluated via a billable video visit.      The patient has been notified of following:     \"This video visit will be conducted via a call between you and your physician/provider. We have found that certain health care needs can be provided without the need for an in-person physical exam.  This service lets us provide the care you need with a video conversation.  If a prescription is necessary we can send it directly to your pharmacy.  If lab work is needed we can place an order for that and you can then stop by our lab to have the test done at a later time.    Video visits are billed at different rates depending on your insurance coverage.  Please reach out to your insurance provider with any questions.    If during the course of the call the physician/provider feels a video visit is not appropriate, you will not be charged for this service.\"    Patient has given verbal consent for Video visit? Yes    Will anyone else be joining your video visit? No        Video-Visit Details    Type of service:  Video Visit    Video Start Time: 1443  Video End Time: 1508    Originating Location (pt. Location):home    Distant Location (provider location):  [facility] ENDOCRINOLOGY     Platform used for Video Visit: [employer]    HPI:   Franklin is a 49 yo man here for follow up of well controlled type 1 diabetes.  He also sees Dr. Wilson.  He reports that he has been doing well and has remained healthy " through the pandemic.  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  He has been doing more running after work and is really enjoying this, however he has developed plantar fasciitis, which has been bothersome.  He manages to avoid hypoglycemia while running by consuming carbs before his run to increase his glucose over 200 mg/dL.  He has been running up to 5 miles at a time and has not had severe hypoglycemia.     Franklin is currently on  tresiba 9 units daily and Novolog 1 unit/20 grams of CHO with meals and snacks, and for pre-meal correction @ 1/2 U per 30 mg/dL above 150 during the day and over 200 at HS.    Franklin wears a Dexcom G6 sensor with overall average of 156 mg/dL for the past two weeks.  CV 26%        He really likes having a sensor and feels it helps him manage his diabetes better.  He has never worn a pump.  He does have strong symptoms alerting him to hypoglycemia.  His brother also had type 1 diabetes and  of DKA.      Franklin also has a history of Hashimoto's ab positivity with normal function, has never been on levothyroxine replacement therapy. Thyroid function has remained normal.     Franklin has questions about COVID 19 vaccines and his risks associated with having diabetes. He has been very careful and has been physically distancing.      He generally has been feeling well and has no concerns today.       Past Medical History:   Diagnosis Date     Chronic lymphocytic thyroiditis 2011     Chronic lymphocytic thyroiditis 2011     Chronic lymphocytic thyroiditis      Depressive disorder 1997    Treated for a couple of years; not currently experiencing     Diabetes 2009       Past Surgical History:   Procedure Laterality Date     HC TOOTH EXTRACTION W/FORCEP       VASECTOMY  2019       Family History   Problem Relation Age of Onset     Hyperlipidemia Mother      Diabetes Brother      Hypertension Paternal Grandfather      Cerebrovascular Disease Paternal  Grandfather      Hypertension Maternal Grandfather      Hyperlipidemia Maternal Grandfather      Cerebrovascular Disease Maternal Grandfather      Breast Cancer Maternal Aunt      Diabetes Brother      Anxiety Disorder Brother      Coronary Artery Disease Other         Maternal great grandfather     Hypertension Maternal Grandmother      Hyperlipidemia Maternal Grandmother      Hyperlipidemia Brother      Cerebrovascular Disease Paternal Grandmother      Asthma No family hx of      C.A.D. No family hx of      Cancer - colorectal No family hx of      Prostate Cancer No family hx of        Social History     Social History     Marital status:      Spouse name: N/A     Number of children: N/A     Years of education: N/A     Social History Main Topics     Smoking status: Never Smoker     Smokeless tobacco: Never Used     Alcohol use Yes      Comment: ~ 1 drink/day     Drug use: No     Sexual activity: Yes     Partners: Female     Birth control/ protection: Condom, Natural Family Planning      Comment: Would like to talk about vasectomy     Other Topics Concern     Parent/Sibling W/ Cabg, Mi Or Angioplasty Before 65f 55m? No     Social History Narrative   Social Hx:   .  Has a son born in 2011.  Works a  at the [employer]. Now . Physically active- running in the nice weather and playing soccer with his son.    Current Outpatient Medications   Medication     ACE/ARB NOT PRESCRIBED, INTENTIONAL,     acetone urine (KETOSTIX) test strip     atorvastatin (LIPITOR) 10 MG tablet     blood glucose (NO BRAND SPECIFIED) test strip     blood glucose monitoring (NO BRAND SPECIFIED) meter device kit     Continuous Blood Gluc Sensor (DEXCOM G6 SENSOR) MISC     Continuous Blood Gluc Transmit (DEXCOM G6 TRANSMITTER) MISC     glucagon (GLUCAGON EMERGENCY) 1 MG kit     Glucagon 3 MG/DOSE POWD     insulin degludec (TRESIBA FLEXTOUCH) 100 UNIT/ML pen     insulin pen needle (B-D U/F)  31G X 5 MM miscellaneous     ketoconazole (NIZORAL) 2 % external shampoo     loratadine (CLARITIN) 10 MG tablet     NOVOLOG PENFILL 100 UNIT/ML soln     PRECISION XTRA KETONE STRP     Sodium Fluoride (CLINPRO 5000) 1.1 % PSTE     VITAMIN D, CHOLECALCIFEROL, PO     No current facility-administered medications for this visit.           Allergies   Allergen Reactions     Ceftin Rash     Seasonal Allergies      Ampicillin Rash       Physical Exam  There were no vitals taken for this visit.   GENERAL: healthy, alert and no distress  RESP: no audible wheeze, cough, or visible cyanosis.  No visible retractions or increased work of breathing.  Able to speak fully in complete sentences.  PSYCH: mentation appears normal, affect normal/bright, judgement and insight intact, normal speech and appearance well-groomed    RESULTS  Lab Results   Component Value Date    A1C 6.8 (H) 07/03/2020    A1C 8.4 (H) 03/13/2017    A1C 7.6 (H) 02/18/2016    A1C 7.8 (H) 07/07/2015    A1C 6.9 (H) 12/03/2013    HEMOGLOBINA1 6.9 (A) 03/10/2020    HEMOGLOBINA1 6.8 (A) 09/10/2019    HEMOGLOBINA1 6.6 (A) 06/03/2019    HEMOGLOBINA1 7.0 (A) 03/12/2019    HEMOGLOBINA1 7.9 (A) 11/13/2018       TSH   Date Value Ref Range Status   07/03/2020 2.72 0.40 - 4.00 mU/L Final   07/03/2020 2.72 0.40 - 4.00 mU/L Final   05/24/2019 1.89 0.40 - 4.00 mU/L Final   04/18/2018 2.20 0.40 - 4.00 mU/L Final   03/13/2017 2.81 0.40 - 4.00 mU/L Final     T4 Total   Date Value Ref Range Status   09/28/2010 9.0 5.0 - 11.0 ug/dL Final     T4 Free   Date Value Ref Range Status   02/18/2016 1.06 0.76 - 1.46 ng/dL Final   07/07/2015 1.04 0.76 - 1.46 ng/dL Final   01/05/2015 1.02 0.76 - 1.46 ng/dL Final     Comment:     Effective 7/30/2014, the reference range for this assay has changed to reflect   new instrumentation/methodology.     12/03/2013 1.08 0.70 - 1.85 ng/dL Final   01/26/2011 1.10 0.70 - 1.85 ng/dL Final       ALT   Date Value Ref Range Status   05/24/2019 25 0 - 70 U/L  Final   03/13/2017 24 0 - 70 U/L Final   ]    Recent Labs   Lab Test 07/03/20  0831 05/24/19  0938 01/05/15  0933 01/05/15  0933 12/03/13  0907   CHOL 136 122   < > 134 163   HDL 55 50   < > 47 43   LDL 71 63   < > 76 108   TRIG 51 43   < > 57 57   CHOLHDLRATIO  --   --   --  2.9 3.8    < > = values in this interval not displayed.       Lab Results   Component Value Date     03/13/2017      Lab Results   Component Value Date    POTASSIUM 4.3 03/13/2017     Lab Results   Component Value Date    CHLORIDE 102 03/13/2017     Lab Results   Component Value Date    SHANIKA 9.1 03/13/2017     Lab Results   Component Value Date    CO2 31 03/13/2017     Lab Results   Component Value Date    BUN 15 03/13/2017     Lab Results   Component Value Date    CR 0.98 07/03/2020       GFR Estimate   Date Value Ref Range Status   07/03/2020 >90 >60 mL/min/[1.73_m2] Final     Comment:     Non  GFR Calc  Starting 12/18/2018, serum creatinine based estimated GFR (eGFR) will be   calculated using the Chronic Kidney Disease Epidemiology Collaboration   (CKD-EPI) equation.     05/24/2019 >90 >60 mL/min/[1.73_m2] Final     Comment:     Non  GFR Calc  Starting 12/18/2018, serum creatinine based estimated GFR (eGFR) will be   calculated using the Chronic Kidney Disease Epidemiology Collaboration   (CKD-EPI) equation.     04/18/2018 >90 >60 mL/min/1.7m2 Final     Comment:     Non  GFR Calc     GFR Estimate If Black   Date Value Ref Range Status   07/03/2020 >90 >60 mL/min/[1.73_m2] Final     Comment:      GFR Calc  Starting 12/18/2018, serum creatinine based estimated GFR (eGFR) will be   calculated using the Chronic Kidney Disease Epidemiology Collaboration   (CKD-EPI) equation.     05/24/2019 >90 >60 mL/min/[1.73_m2] Final     Comment:      GFR Calc  Starting 12/18/2018, serum creatinine based estimated GFR (eGFR) will be   calculated using the Chronic Kidney  "Disease Epidemiology Collaboration   (CKD-EPI) equation.     04/18/2018 >90 >60 mL/min/1.7m2 Final     Comment:      GFR Calc       Lab Results   Component Value Date    MICROL 9 07/03/2020     No results found for: MICROALBUMIN  Lab Results   Component Value Date    CPEPT 2.3 11/19/2009    GADAB >250.0 (H) 11/19/2009    ISCAB  09/08/2009     1:16  Reference range: <1:4  (Note)  TEST INFORMATION: Islet Cell Ab, IgG  Islet cell antibodies have been associated with  \"autoimmune\" endocrine disorders and insulin-dependent  diabetes. This disorder is characterized by the presence  of antibodies in patients that may be detected years  before the onset of the clinical symptoms. To calculate  Juvenile Diabetes Foundation (JDF) units: multiply the  titer x 5 (1:8  8 x 5 = 40 JDF Units).  Performed by [facility],  [address] [place] [id number] [phone]  [email or web address], Evelia Miles MD, Lab. Director       No results found for: B12]    Most recent eye exam date: : Not Found     Eye exam in January, 2020- no retinopathy.     Assessment/Plan:     1.  Type 1 diabetes- Franklin's glucose control has improved and he has very little glucose variability (CV 26%).  He is managing his glucose quite well with exercise and is largely avoiding hypoglycemia.  I did discuss option of using a more rapid acting insulin, such as Fiasp to help control post-prandial glucose (he sometimes finds it challenging to take his insulin 15 minutes ahead). We made no changes today and I encouraged him to continue with his excellent glucose management.  Discussed COVID-19 precautions.   The American Diabetes Association has issued a nice video with checklist for patients with diabetes: https://www.diabetes.org/diabetes/treatment-care/planning-sick-days/coronavirus    2.  Risk factors-     Retinopathy:  No.  Had eye exam this year.  Will schedule in January, 2021.   Nephropathy:  BP historically well controlled. No " microalbuminuria.  Creatinine stable.   Neuropathy: No.    Feet: OK, no ulcers. +plantar fasciitis.  Using ice/stretching/ibuprofen. Recommended good arch support.   Lipids:  LDL at target.  Patient taking statin  Celiac screening: negative 2018  Thyroid: Positive TPO antibodies- TSH normal.  Will follow.     4. F/U in 3 months with Dr. Wilson, 6 months with me.      Liz Conroy PA-C, MPAS   [facility]  Department of Medicine  Division of Endocrinology and Diabetes

## 2020-12-17 ENCOUNTER — MYC MEDICAL ADVICE (OUTPATIENT)
Dept: ENDOCRINOLOGY | Facility: CLINIC | Age: 50
End: 2020-12-17

## 2021-01-15 ENCOUNTER — HEALTH MAINTENANCE LETTER (OUTPATIENT)
Age: 51
End: 2021-01-15

## 2021-02-08 ENCOUNTER — MYC MEDICAL ADVICE (OUTPATIENT)
Dept: ENDOCRINOLOGY | Facility: CLINIC | Age: 51
End: 2021-02-08

## 2021-02-08 DIAGNOSIS — E10.9 WELL CONTROLLED TYPE 1 DIABETES MELLITUS (H): ICD-10-CM

## 2021-02-08 RX ORDER — PROCHLORPERAZINE 25 MG/1
1 SUPPOSITORY RECTAL
Qty: 10 EACH | Refills: 3 | Status: SHIPPED | OUTPATIENT
Start: 2021-02-08 | End: 2022-02-02

## 2021-02-08 RX ORDER — PROCHLORPERAZINE 25 MG/1
1 SUPPOSITORY RECTAL
Qty: 1 EACH | Refills: 3 | Status: SHIPPED | OUTPATIENT
Start: 2021-02-08 | End: 2022-02-02

## 2021-03-15 NOTE — PROGRESS NOTES
Franklin Muir  is being evaluated via a billable video visit.      How would you like to obtain your AVS? [employer]  For the video visit, send the invitation by: [employer]  Will anyone else be joining your video visit? No          Franklin Muir is a 50 year old male who is being evaluated via a billable video visit.        Video-Visit Details    Type of service:  Video Visit    Video Start Time: 8:26 AM  Video End Time: 9:02  Video duration: 36 min   Total duration:     Originating Location (pt. Location): Home    Distant Location (provider location):  [facility] ENDOCRINOLOGY     Platform used for Video Visit: [facility]      Due to the COVID 19 pandemic this visit was a video visit in order to help prevent spread of infection in this high risk patient and the general population.       Franklin Muir MRN:[id number] YOB: 1970  Primary care provider: Ruddy Malave     HPI:  Franklin Muir is a very pleasant 50 year old male with a past medical history of type 1 DM, diagnosed about 11 years ago who has a visit today for follow up on type 1 diabetes.     Franklin reports he is feeling good and that except for sitting in front of his computer for 8 hours a day he has been more active than before.  He has been running outside 2-3 times per week, between 4 and 5 miles.    He continues to follow the CDC recommendations for prevention and mitigation of coronavirus and he will receive the COVID-19 vaccine (Pfizer) next Thursday.  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    He  continues on Tresiba 9 units daily at 7 AM. Novolog 1 U /20 g of CHO with meals and snacks and premeals correction @ 0.5 U per 30 mg/dL above 150 mg/dL during the day and over 200 mg/dL HS.      He feels things are going well for him.  He reports a few episodes of hypoglycemia after he runs despite taking additional carbohydrates before running.  Franklin  develops hypoglycemic symptoms when his blood glucose levels are around 70 mg/dL.      His highest blood glucose levels are after breakfast.  - Breakfast is usually a protein bar or an English muffin or a toast, he also has a couple cups of coffee with cream.  He is using insulin to cover about 50 g of carbohydrates with breakfast.  -He is taking his NovoLog closing to the time he starts to eat.    He is wearing Dexcom G6 sensor 93% of the time with overall average of 157   43 mg/dl over the last month.   He is been 64 % in target range. 1% low, 0% very low, 32% high, 3% very high.  A1C from 7/3/20 was 6.8%.  GMI is 7.1%  Highest blood glucose peak is at breakfast as detailed above and it takes about 4 hours to return to preprandial blood glucose levels      Dexcom alert settings are as follows:  Low 70 mg/dL  Urgent low 55 mg/dL, repeat 30 minutes  High 350 mg/dL  Full and rise rate 3 mg/dL/min      Hashimoto's thyroiditis: Franklin also has a history of positive thyroid antibodies compatible with autoimmune thyroid disease with normal thyroid function.  He has never been on levothyroxine replacement therapy.   He denies any symptoms suggestive of hypo-or hyperthyroidism.  No constipation, fatigue, or swelling.     Review Of Systems  12 point ROS negative unless noted above    Social Hx:   . Has a young son, in 4th grade.   Works as a  at the [employer]. 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     Past Medical History  Past Medical History:   Diagnosis Date     Chronic lymphocytic thyroiditis 7/20/2011     Chronic lymphocytic thyroiditis 7/20/2011     Chronic lymphocytic thyroiditis      Depressive disorder 06/01/1997    Treated for a couple of years; not currently experiencing     Diabetes 6/23/2009       Past Surgical History  Past Surgical History:   Procedure Laterality Date     HC TOOTH EXTRACTION W/FORCEP       VASECTOMY  01/2019      Family History  Family History   Problem  Relation Age of Onset     Hyperlipidemia Mother      Diabetes Brother      Hypertension Paternal Grandfather      Cerebrovascular Disease Paternal Grandfather      Hypertension Maternal Grandfather      Hyperlipidemia Maternal Grandfather      Cerebrovascular Disease Maternal Grandfather      Breast Cancer Maternal Aunt      Diabetes Brother      Anxiety Disorder Brother      Coronary Artery Disease Other         Maternal great grandfather     Hypertension Maternal Grandmother      Hyperlipidemia Maternal Grandmother      Hyperlipidemia Brother      Cerebrovascular Disease Paternal Grandmother      Asthma No family hx of      C.A.D. No family hx of      Cancer - colorectal No family hx of      Prostate Cancer No family hx of      Brother had T1D  at age 26 from DKA  Children: 1 son, 9 years old, healthy    Medications  Current Outpatient Medications   Medication Sig Dispense Refill     glucagon (GLUCAGON EMERGENCY) 1 MG kit To treat very low blood sugar in an emergency Use as directed 2 each 2     Glucagon 3 MG/DOSE POWD Spray 3 mg in nostril as needed (Hypoglycemia) 3 each 3     insulin degludec (TRESIBA FLEXTOUCH) 100 UNIT/ML pen Inject 9 units subcutaneously daily 9 mL 3     Sodium Fluoride (CLINPRO 5000) 1.1 % PSTE Apply  to affected area. Use once every night       VITAMIN D, CHOLECALCIFEROL, PO Take 1,000 Units by mouth daily       ACE/ARB NOT PRESCRIBED, INTENTIONAL, by Other route continuous prn.       acetone urine (KETOSTIX) test strip Use as directed in case of high blood sugar or illness. 25 each 3     atorvastatin (LIPITOR) 10 MG tablet Take 1 tablet (10 mg) by mouth daily 90 tablet 4     blood glucose (NO BRAND SPECIFIED) test strip Use to test blood sugar 1 times daily or as directed. 100 strip 3     blood glucose monitoring (NO BRAND SPECIFIED) meter device kit Use to test blood sugar 1 times daily or as directed. 1 kit 1     Continuous Blood Gluc Sensor (DEXCOM G6 SENSOR) MISC 1 each every  10 days 10 each 3     Continuous Blood Gluc Transmit (DEXCOM G6 TRANSMITTER) MISC 1 each every 3 months 1 each 3     insulin pen needle (B-D U/F) 31G X 5 MM miscellaneous Use 5 daily or as directed. 500 each 2     ketoconazole (NIZORAL) 2 % external shampoo   10     loratadine (CLARITIN) 10 MG tablet        NOVOLOG PENFILL 100 UNIT/ML soln 1 unit:15  grams of CHO with meals and snacks, and for pre-meal correction, approx 40 units daily 45 mL 3     PRECISION XTRA KETONE STRP Use as directed in case of high blood sugar or illness. 25 each 3       Physical Examination:  GENERAL: Healthy, alert and no distress  EYES: Eyes grossly normal to inspection.  No discharge or erythema, or obvious scleral/conjunctival abnormalities.  Wears glasses  HENT: Normal cephalic/atraumatic.  External ears, nose and mouth without ulcers or lesions.  No nasal drainage visible.  NECK: No asymmetry, visible masses or scars  RESP: No audible wheeze, cough, or visible cyanosis.  No visible retractions or increased work of breathing.    MS: No gross musculoskeletal defects noted.  Normal range of motion.  No visible edema.  SKIN: Visible skin clear. No significant rash, abnormal pigmentation or lesions.  NEURO: Cranial nerves grossly intact.  Mentation and speech appropriate for age.  PSYCH: Mentation appears normal, affect normal/bright, judgement and insight intact, normal speech and appearance well-groomed.  .     Endocrine Labs:  Lab Results   Component Value Date    A1C 6.8 07/03/2020    A1C 8.4 03/13/2017    A1C 7.6 02/18/2016    A1C 7.8 07/07/2015    A1C 6.9 12/03/2013     TSH   Date Value Ref Range Status   07/03/2020 2.72 0.40 - 4.00 mU/L Final   07/03/2020 2.72 0.40 - 4.00 mU/L Final             Assessment and Plan:   Franklin Muir is a 50 years old male with a 10+ years history of type 1 diabetes and positive thyroid antibodies.      1. Type 1 diabetes mellitus without complication (H)  Blood glucose levels remain well controlled.  We  have again discussed the strategies to improve his blood glucose control after breakfast, and Franklin will plan to take his NovoLog 15 minutes prior to starting his breakfast. Patient is agreeable with this plan.      2.  Chronic complications of diabetes  Diabetes Care:  Retinopathy:  None.  Had dermatology appointment in January 2021.  We will send a copy of the report via [employer].    Nephropathy:  BP well controlled. No microalbuminuria.  Creatinine stable.   Neuropathy: No.      3.  Cardiovascular risk factors  Lipids:  LDL at target.  Patient taking statin  Blood pressure: Patient's blood pressure has been normal in the past     4.  Positive TPO antibodies  TSH was normal 7/2020. No further work up needed today.  We will check TSH once a year or sooner if the patient develops symptoms suggestive of hypo- or hyperthyroidism.    5.  COVID-19 pandemic  The patient will continue to follow the CDC recommendations on prevention and mitigation of COVID-19.  He is a scheduled to receive the first dose of the Pfizer within in 2 days.    Return to clinic with Liz Conroy in 3 months and with me in 6 months.  He will do labs prior to his next visit      Orders Placed This Encounter   Procedures     Continuous Glucose Monitoring >=72 hours PHYS INTERP     25 Hydroxyvitamin D2 and D3     Creatinine     Hemoglobin A1c     Albumin Random Urine Quantitative with Creat Ratio     Lipid Profile     TSH     Potassium     Patient Instructions   -Continue Tresiba 9 units subcutaneous in the morning  -Continue NovoLog 1 unit per 20 g of carbohydrates with meals and snacks  -Please plan to take your NovoLog 15 minutes prior to breakfast  -Continue NovoLog 0.5 units per 30 mg/dL above 150 mg/dL during the day and over 200 mg/dL at night.  -Please call the lab at the number below to schedule blood and urine test for a time that is convenient for you prior to your next appointment.  Please remember you need 8 hours of fasting for the  lipid panel  -Our clinic staff will reach out to reschedule a follow-up appointment with Liz Conroy in 3 months and a follow-up appointment with me (Dr. Wilson) in 6 months.  -Please continue to follow the CDC indications for prevention and mitigation of COVID-19.  I am glad that you will receive the first thoughts of the COVID-19 vaccine still this week.  -Congratulations again excellent care of your diabetes.    It was a pleasure to see you today.  Please feel free to contact our clinic if you have any questions or concerns.  Sincerely,    Freya Wilson MD PhD    Division of Endocrinology and Diabetes            60 minutes spent on the date of the encounter doing chart review, history and exam, documentation and further activities as noted above      Freya Wilson MD PhD    Division of Endocrinology and Diabetes

## 2021-03-16 ENCOUNTER — VIRTUAL VISIT (OUTPATIENT)
Dept: ENDOCRINOLOGY | Facility: CLINIC | Age: 51
End: 2021-03-16
Payer: COMMERCIAL

## 2021-03-16 DIAGNOSIS — E10.9 TYPE 1 DIABETES MELLITUS WITHOUT COMPLICATION (H): Primary | ICD-10-CM

## 2021-03-16 PROCEDURE — 99215 OFFICE O/P EST HI 40 MIN: CPT | Mod: 95 | Performed by: INTERNAL MEDICINE

## 2021-03-16 PROCEDURE — 95251 CONT GLUC MNTR ANALYSIS I&R: CPT | Performed by: INTERNAL MEDICINE

## 2021-03-16 NOTE — LETTER
3/16/2021       RE: Franklin Muir  [address]  [place] [address]     Dear Colleague,    Thank you for referring your patient, Franklin Muir, to the [facility] [place] at [facility]. Please see a copy of my visit note below.    Franklin Muir  is being evaluated via a billable video visit.      How would you like to obtain your AVS? Worldrat  For the video visit, send the invitation by: [employer]  Will anyone else be joining your video visit? No          Franklin Muir is a 50 year old male who is being evaluated via a billable video visit.        Video-Visit Details    Type of service:  Video Visit    Video Start Time: 8:26 AM  Video End Time: 9:02  Video duration: 36 min   Total duration:     Originating Location (pt. Location): Home    Distant Location (provider location):  [facility] ENDOCRINOLOGY     Platform used for Video Visit: [facility]      Due to the COVID 19 pandemic this visit was a video visit in order to help prevent spread of infection in this high risk patient and the general population.       Franklin Muir MRN:[id number] YOB: 1970  Primary care provider: Ruddy Malave     HPI:  Franklin Muir is a very pleasant 50 year old male with a past medical history of type 1 DM, diagnosed about 11 years ago who has a visit today for follow up on type 1 diabetes.     Franklin reports he is feeling good and that except for sitting in front of his computer for 8 hours a day he has been more active than before.  He has been running outside 2-3 times per week, between 4 and 5 miles.    He continues to follow the CDC recommendations for prevention and mitigation of coronavirus and he will receive the COVID-19 vaccine (Pfizer) next Thursday.  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    He  continues on Tresiba 9 units daily at 7  AM. Novolog 1 U /20 g of CHO with meals and snacks and premeals correction @ 0.5 U per 30 mg/dL above 150 mg/dL during the day and over 200 mg/dL HS.      He feels things are going well for him.  He reports a few episodes of hypoglycemia after he runs despite taking additional carbohydrates before running.  Franklin develops hypoglycemic symptoms when his blood glucose levels are around 70 mg/dL.      His highest blood glucose levels are after breakfast.  - Breakfast is usually a protein bar or an English muffin or a toast, he also has a couple cups of coffee with cream.  He is using insulin to cover about 50 g of carbohydrates with breakfast.  -He is taking his NovoLog closing to the time he starts to eat.    He is wearing Dexcom G6 sensor 93% of the time with overall average of 157   43 mg/dl over the last month.   He is been 64 % in target range. 1% low, 0% very low, 32% high, 3% very high.  A1C from 7/3/20 was 6.8%.  GMI is 7.1%  Highest blood glucose peak is at breakfast as detailed above and it takes about 4 hours to return to preprandial blood glucose levels      Dexcom alert settings are as follows:  Low 70 mg/dL  Urgent low 55 mg/dL, repeat 30 minutes  High 350 mg/dL  Full and rise rate 3 mg/dL/min      Hashimoto's thyroiditis: Franklin also has a history of positive thyroid antibodies compatible with autoimmune thyroid disease with normal thyroid function.  He has never been on levothyroxine replacement therapy.   He denies any symptoms suggestive of hypo-or hyperthyroidism.  No constipation, fatigue, or swelling.     Review Of Systems  12 point ROS negative unless noted above    Social Hx:   . Has a young son, in 4th grade.   Works as a  at the [employer]. 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     Past Medical History  Past Medical History:   Diagnosis Date     Chronic lymphocytic thyroiditis 7/20/2011     Chronic lymphocytic thyroiditis 7/20/2011     Chronic lymphocytic  thyroiditis      Depressive disorder 1997    Treated for a couple of years; not currently experiencing     Diabetes 2009       Past Surgical History  Past Surgical History:   Procedure Laterality Date     HC TOOTH EXTRACTION W/FORCEP       VASECTOMY  2019      Family History  Family History   Problem Relation Age of Onset     Hyperlipidemia Mother      Diabetes Brother      Hypertension Paternal Grandfather      Cerebrovascular Disease Paternal Grandfather      Hypertension Maternal Grandfather      Hyperlipidemia Maternal Grandfather      Cerebrovascular Disease Maternal Grandfather      Breast Cancer Maternal Aunt      Diabetes Brother      Anxiety Disorder Brother      Coronary Artery Disease Other         Maternal great grandfather     Hypertension Maternal Grandmother      Hyperlipidemia Maternal Grandmother      Hyperlipidemia Brother      Cerebrovascular Disease Paternal Grandmother      Asthma No family hx of      C.A.D. No family hx of      Cancer - colorectal No family hx of      Prostate Cancer No family hx of      Brother had T1D  at age 26 from DKA  Children: 1 son, 9 years old, healthy    Medications  Current Outpatient Medications   Medication Sig Dispense Refill     glucagon (GLUCAGON EMERGENCY) 1 MG kit To treat very low blood sugar in an emergency Use as directed 2 each 2     Glucagon 3 MG/DOSE POWD Spray 3 mg in nostril as needed (Hypoglycemia) 3 each 3     insulin degludec (TRESIBA FLEXTOUCH) 100 UNIT/ML pen Inject 9 units subcutaneously daily 9 mL 3     Sodium Fluoride (CLINPRO 5000) 1.1 % PSTE Apply  to affected area. Use once every night       VITAMIN D, CHOLECALCIFEROL, PO Take 1,000 Units by mouth daily       ACE/ARB NOT PRESCRIBED, INTENTIONAL, by Other route continuous prn.       acetone urine (KETOSTIX) test strip Use as directed in case of high blood sugar or illness. 25 each 3     atorvastatin (LIPITOR) 10 MG tablet Take 1 tablet (10 mg) by mouth daily 90 tablet 4      blood glucose (NO BRAND SPECIFIED) test strip Use to test blood sugar 1 times daily or as directed. 100 strip 3     blood glucose monitoring (NO BRAND SPECIFIED) meter device kit Use to test blood sugar 1 times daily or as directed. 1 kit 1     Continuous Blood Gluc Sensor (DEXCOM G6 SENSOR) MISC 1 each every 10 days 10 each 3     Continuous Blood Gluc Transmit (DEXCOM G6 TRANSMITTER) MISC 1 each every 3 months 1 each 3     insulin pen needle (B-D U/F) 31G X 5 MM miscellaneous Use 5 daily or as directed. 500 each 2     ketoconazole (NIZORAL) 2 % external shampoo   10     loratadine (CLARITIN) 10 MG tablet        NOVOLOG PENFILL 100 UNIT/ML soln 1 unit:15  grams of CHO with meals and snacks, and for pre-meal correction, approx 40 units daily 45 mL 3     PRECISION XTRA KETONE STRP Use as directed in case of high blood sugar or illness. 25 each 3       Physical Examination:  GENERAL: Healthy, alert and no distress  EYES: Eyes grossly normal to inspection.  No discharge or erythema, or obvious scleral/conjunctival abnormalities.  Wears glasses  HENT: Normal cephalic/atraumatic.  External ears, nose and mouth without ulcers or lesions.  No nasal drainage visible.  NECK: No asymmetry, visible masses or scars  RESP: No audible wheeze, cough, or visible cyanosis.  No visible retractions or increased work of breathing.    MS: No gross musculoskeletal defects noted.  Normal range of motion.  No visible edema.  SKIN: Visible skin clear. No significant rash, abnormal pigmentation or lesions.  NEURO: Cranial nerves grossly intact.  Mentation and speech appropriate for age.  PSYCH: Mentation appears normal, affect normal/bright, judgement and insight intact, normal speech and appearance well-groomed.  .     Endocrine Labs:  Lab Results   Component Value Date    A1C 6.8 07/03/2020    A1C 8.4 03/13/2017    A1C 7.6 02/18/2016    A1C 7.8 07/07/2015    A1C 6.9 12/03/2013     TSH   Date Value Ref Range Status   07/03/2020 2.72 0.40  - 4.00 mU/L Final   07/03/2020 2.72 0.40 - 4.00 mU/L Final             Assessment and Plan:   Franklin Muir is a 50 years old male with a 10+ years history of type 1 diabetes and positive thyroid antibodies.      1. Type 1 diabetes mellitus without complication (H)  Blood glucose levels remain well controlled.  We have again discussed the strategies to improve his blood glucose control after breakfast, and Franklin will plan to take his NovoLog 15 minutes prior to starting his breakfast. Patient is agreeable with this plan.      2.  Chronic complications of diabetes  Diabetes Care:  Retinopathy:  None.  Had dermatology appointment in January 2021.  We will send a copy of the report via [employer].    Nephropathy:  BP well controlled. No microalbuminuria.  Creatinine stable.   Neuropathy: No.      3.  Cardiovascular risk factors  Lipids:  LDL at target.  Patient taking statin  Blood pressure: Patient's blood pressure has been normal in the past     4.  Positive TPO antibodies  TSH was normal 7/2020. No further work up needed today.  We will check TSH once a year or sooner if the patient develops symptoms suggestive of hypo- or hyperthyroidism.    5.  COVID-19 pandemic  The patient will continue to follow the CDC recommendations on prevention and mitigation of COVID-19.  He is a scheduled to receive the first dose of the Pfizer within in 2 days.    Return to clinic with Liz Conroy in 3 months and with me in 6 months.  He will do labs prior to his next visit      Orders Placed This Encounter   Procedures     Continuous Glucose Monitoring >=72 hours PHYS INTERP     25 Hydroxyvitamin D2 and D3     Creatinine     Hemoglobin A1c     Albumin Random Urine Quantitative with Creat Ratio     Lipid Profile     TSH     Potassium     Patient Instructions   -Continue Tresiba 9 units subcutaneous in the morning  -Continue NovoLog 1 unit per 20 g of carbohydrates with meals and snacks  -Please plan to take your NovoLog 15 minutes prior  to breakfast  -Continue NovoLog 0.5 units per 30 mg/dL above 150 mg/dL during the day and over 200 mg/dL at night.  -Please call the lab at the number below to schedule blood and urine test for a time that is convenient for you prior to your next appointment.  Please remember you need 8 hours of fasting for the lipid panel  -Our clinic staff will reach out to reschedule a follow-up appointment with Liz Conroy in 3 months and a follow-up appointment with me (Dr. Wilson) in 6 months.  -Please continue to follow the CDC indications for prevention and mitigation of COVID-19.  I am glad that you will receive the first thoughts of the COVID-19 vaccine still this week.  -Congratulations again excellent care of your diabetes.    It was a pleasure to see you today.  Please feel free to contact our clinic if you have any questions or concerns.  Sincerely,    Freya Wilson MD PhD    Division of Endocrinology and Diabetes            60 minutes spent on the date of the encounter doing chart review, history and exam, documentation and further activities as noted above      Freya Wilson MD PhD    Division of Endocrinology and Diabetes

## 2021-03-16 NOTE — PATIENT INSTRUCTIONS
-Continue Tresiba 9 units subcutaneous in the morning  -Continue NovoLog 1 unit per 20 g of carbohydrates with meals and snacks  -Please plan to take your NovoLog 15 minutes prior to breakfast  -Continue NovoLog 0.5 units per 30 mg/dL above 150 mg/dL during the day and over 200 mg/dL at night.  -Please call the lab at the number below to schedule blood and urine test for a time that is convenient for you prior to your next appointment.  Please remember you need 8 hours of fasting for the lipid panel  -Our clinic staff will reach out to reschedule a follow-up appointment with Liz Conroy in 3 months and a follow-up appointment with me (Dr. Wilson) in 6 months.  -Please continue to follow the CDC indications for prevention and mitigation of COVID-19.  I am glad that you will receive the first thoughts of the COVID-19 vaccine still this week.  -Congratulations again excellent care of your diabetes.    It was a pleasure to see you today.  Please feel free to contact our clinic if you have any questions or concerns.  Sincerely,    Freya Wilson MD PhD    Division of Endocrinology and Diabetes

## 2021-04-23 DIAGNOSIS — E10.9 TYPE 1 DIABETES MELLITUS WITHOUT COMPLICATION (H): ICD-10-CM

## 2021-04-23 RX ORDER — PEN NEEDLE, DIABETIC 31 GX3/16"
NEEDLE, DISPOSABLE MISCELLANEOUS
Qty: 500 EACH | Refills: 3 | Status: SHIPPED | OUTPATIENT
Start: 2021-04-23 | End: 2022-06-30

## 2021-04-23 NOTE — TELEPHONE ENCOUNTER
PENTIPS 31G X 5 MM MISC  Last Written Prescription Date: 8/31/2020  Last Fill Quantity: 500,   # refills: 2  Last Office Visit : 3/16/2021  Future Office visit:  7/5/2021  500 Each, 3 Refills sent to pharm  4/23/2021      Isa Resendiz RN  Central Triage Red Flags/Med Refills

## 2021-05-26 ASSESSMENT — ENCOUNTER SYMPTOMS
SHORTNESS OF BREATH: 0
DIARRHEA: 0
HEARTBURN: 0
COUGH: 0
SORE THROAT: 0
MYALGIAS: 0
WEAKNESS: 0
CHILLS: 0
DYSURIA: 0
CONSTIPATION: 0
FEVER: 0
PALPITATIONS: 0
FREQUENCY: 0
HEMATURIA: 0
JOINT SWELLING: 0
EYE PAIN: 0
HEADACHES: 0
NAUSEA: 0
ABDOMINAL PAIN: 0
ARTHRALGIAS: 0
PARESTHESIAS: 0
DIZZINESS: 0
NERVOUS/ANXIOUS: 0
HEMATOCHEZIA: 0

## 2021-05-27 ENCOUNTER — OFFICE VISIT (OUTPATIENT)
Dept: FAMILY MEDICINE | Facility: CLINIC | Age: 51
End: 2021-05-27
Payer: COMMERCIAL

## 2021-05-27 VITALS
SYSTOLIC BLOOD PRESSURE: 102 MMHG | HEART RATE: 67 BPM | TEMPERATURE: 98 F | HEIGHT: 70 IN | OXYGEN SATURATION: 99 % | DIASTOLIC BLOOD PRESSURE: 52 MMHG | BODY MASS INDEX: 23.96 KG/M2 | WEIGHT: 167.4 LBS | RESPIRATION RATE: 17 BRPM

## 2021-05-27 DIAGNOSIS — E10.65 TYPE 1 DIABETES MELLITUS WITH HYPERGLYCEMIA (H): ICD-10-CM

## 2021-05-27 DIAGNOSIS — Z11.4 SCREENING FOR HIV (HUMAN IMMUNODEFICIENCY VIRUS): ICD-10-CM

## 2021-05-27 DIAGNOSIS — Z11.59 NEED FOR HEPATITIS C SCREENING TEST: ICD-10-CM

## 2021-05-27 DIAGNOSIS — Z00.00 ROUTINE GENERAL MEDICAL EXAMINATION AT A HEALTH CARE FACILITY: Primary | ICD-10-CM

## 2021-05-27 PROCEDURE — 99396 PREV VISIT EST AGE 40-64: CPT | Performed by: FAMILY MEDICINE

## 2021-05-27 ASSESSMENT — PAIN SCALES - GENERAL: PAINLEVEL: NO PAIN (0)

## 2021-05-27 ASSESSMENT — ENCOUNTER SYMPTOMS
SHORTNESS OF BREATH: 0
ARTHRALGIAS: 0
EYE PAIN: 0
SORE THROAT: 0
HEMATOCHEZIA: 0
DYSURIA: 0
ENDOCRINE NEGATIVE: 1
CONSTIPATION: 0
HEMATURIA: 0
HEADACHES: 0
DIZZINESS: 0
PALPITATIONS: 0
DIARRHEA: 0
WEAKNESS: 0
NERVOUS/ANXIOUS: 0
HEARTBURN: 0
PARESTHESIAS: 0
ALLERGIC/IMMUNOLOGIC NEGATIVE: 1
FEVER: 0
JOINT SWELLING: 0
ABDOMINAL PAIN: 0
HEMATOLOGIC/LYMPHATIC NEGATIVE: 1
MYALGIAS: 0
FREQUENCY: 0
CHILLS: 0
NAUSEA: 0
COUGH: 0

## 2021-05-27 ASSESSMENT — MIFFLIN-ST. JEOR: SCORE: 1636.82

## 2021-05-27 NOTE — PATIENT INSTRUCTIONS
Preventive Health Recommendations  Male Ages 40 to 49    Yearly exam:             See your health care provider every year in order to  o   Review health changes.   o   Discuss preventive care.    o   Review your medicines if your doctor has prescribed any.    You should be tested each year for STDs (sexually transmitted diseases) if you re at risk.     Have a cholesterol test every 5 years.     Have a colonoscopy (test for colon cancer) if someone in your family has had colon cancer or polyps before age 50.     After age 45, have a diabetes test (fasting glucose). If you are at risk for diabetes, you should have this test every 3 years.      Talk with your health care provider about whether or not a prostate cancer screening test (PSA) is right for you.    Shots: Get a flu shot each year. Get a tetanus shot every 10 years.     Nutrition:    Eat at least 5 servings of fruits and vegetables daily.     Eat whole-grain bread, whole-wheat pasta and brown rice instead of white grains and rice.     Get adequate Calcium and Vitamin D.     Lifestyle    Exercise for at least 150 minutes a week (30 minutes a day, 5 days a week). This will help you control your weight and prevent disease.     Limit alcohol to one drink per day.     No smoking.     Wear sunscreen to prevent skin cancer.     See your dentist every six months for an exam and cleaning.      
None

## 2021-05-27 NOTE — PROGRESS NOTES
SUBJECTIVE:   CC: Franklin Muir is an 50 year old male who presents for preventative health visit.   History of diabetes type 1, follow-up with endocrinology.    Patient has been advised of split billing requirements and indicates understanding: Yes  Healthy Habits:     Getting at least 3 servings of Calcium per day:  Yes    Bi-annual eye exam:  Yes    Dental care twice a year:  Yes    Sleep apnea or symptoms of sleep apnea:  None    Diet:  Diabetic and Carbohydrate counting    Frequency of exercise:  4-5 days/week    Duration of exercise:  45-60 minutes    Taking medications regularly:  Yes    Barriers to taking medications:  None    Medication side effects:  None    PHQ-2 Total Score: 0    Additional concerns today:  Yes      Today's PHQ-2 Score:   PHQ-2 ( 1999 Pfizer) 5/26/2021   Q1: Little interest or pleasure in doing things 0   Q2: Feeling down, depressed or hopeless 0   PHQ-2 Score 0   Q1: Little interest or pleasure in doing things Not at all   Q2: Feeling down, depressed or hopeless Not at all   PHQ-2 Score 0       Abuse: Current or Past(Physical, Sexual or Emotional)- No  Do you feel safe in your environment? Yes    Have you ever done Advance Care Planning? (For example, a Health Directive, POLST, or a discussion with a medical provider or your loved ones about your wishes): Yes, advance care planning is on file.    Social History     Tobacco Use     Smoking status: Never Smoker     Smokeless tobacco: Never Used   Substance Use Topics     Alcohol use: Yes     Comment: ~ 1 drink/day     If you drink alcohol do you typically have >3 drinks per day or >7 drinks per week? No      Alcohol Use 5/26/2021   Prescreen: >3 drinks/day or >7 drinks/week? No   Prescreen: >3 drinks/day or >7 drinks/week? -   No flowsheet data found.    Last PSA: No results found for: PSA    Reviewed orders with patient. Reviewed health maintenance and updated orders accordingly - Yes  Labs reviewed in EPIC  BP Readings from Last 3  Encounters:   05/27/21 102/52   03/10/20 127/83   12/16/19 113/74    Wt Readings from Last 3 Encounters:   05/27/21 75.9 kg (167 lb 6.4 oz)   03/10/20 79.6 kg (175 lb 6.4 oz)   12/16/19 79.8 kg (176 lb)                  Patient Active Problem List   Diagnosis     CARDIOVASCULAR SCREENING; LDL GOAL LESS THAN 100     Chronic lymphocytic thyroiditis     Type 1 diabetes mellitus with hyperglycemia (H)     Advance Care Planning     Past Surgical History:   Procedure Laterality Date     HC TOOTH EXTRACTION W/FORCEP       VASECTOMY  01/2019       Social History     Tobacco Use     Smoking status: Never Smoker     Smokeless tobacco: Never Used   Substance Use Topics     Alcohol use: Yes     Comment: ~ 1 drink/day     Family History   Problem Relation Age of Onset     Hyperlipidemia Mother      Diabetes Brother      Hypertension Paternal Grandfather      Cerebrovascular Disease Paternal Grandfather      Hypertension Maternal Grandfather      Hyperlipidemia Maternal Grandfather      Cerebrovascular Disease Maternal Grandfather      Breast Cancer Maternal Aunt      Diabetes Brother      Anxiety Disorder Brother      Coronary Artery Disease Other         Maternal great grandfather     Hypertension Maternal Grandmother      Hyperlipidemia Maternal Grandmother      Hyperlipidemia Brother      Cerebrovascular Disease Paternal Grandmother      Asthma No family hx of      C.A.D. No family hx of      Cancer - colorectal No family hx of      Prostate Cancer No family hx of            Reviewed and updated as needed this visit by clinical staff  Tobacco  Allergies  Meds   Med Hx  Surg Hx  Fam Hx  Soc Hx        Reviewed and updated as needed this visit by Provider                Past Medical History:   Diagnosis Date     Chronic lymphocytic thyroiditis 7/20/2011     Chronic lymphocytic thyroiditis 7/20/2011     Chronic lymphocytic thyroiditis      Depressive disorder 06/01/1997    Treated for a couple of years; not currently  experiencing     Diabetes 6/23/2009      Past Surgical History:   Procedure Laterality Date     HC TOOTH EXTRACTION W/FORCEP       VASECTOMY  01/2019     OB History   No obstetric history on file.       Review of Systems   Constitutional: Negative for chills and fever.   HENT: Positive for congestion. Negative for ear pain, hearing loss and sore throat.    Eyes: Negative for pain and visual disturbance.   Respiratory: Negative for cough and shortness of breath.    Cardiovascular: Negative for chest pain, palpitations and peripheral edema.   Gastrointestinal: Negative for abdominal pain, constipation, diarrhea, heartburn, hematochezia and nausea.   Endocrine: Negative.    Genitourinary: Negative for discharge, dysuria, frequency, genital sores, hematuria, impotence and urgency.   Musculoskeletal: Negative for arthralgias, joint swelling and myalgias.   Skin: Negative for rash.   Allergic/Immunologic: Negative.    Neurological: Negative for dizziness, weakness, headaches and paresthesias.   Hematological: Negative.    Psychiatric/Behavioral: Negative for mood changes. The patient is not nervous/anxious.      CONSTITUTIONAL: NEGATIVE for fever, chills, change in weight  INTEGUMENTARY/SKIN: NEGATIVE for worrisome rashes, moles or lesions  EYES: NEGATIVE for vision changes or irritation  ENT: NEGATIVE for ear, mouth and throat problems  RESP: NEGATIVE for significant cough or SOB  CV: NEGATIVE for chest pain, palpitations or peripheral edema  GI: NEGATIVE for nausea, abdominal pain, heartburn, or change in bowel habits   male: negative for dysuria, hematuria, decreased urinary stream, erectile dysfunction, urethral discharge  MUSCULOSKELETAL: NEGATIVE for significant arthralgias or myalgia  NEURO: NEGATIVE for weakness, dizziness or paresthesias  ENDOCRINE: NEGATIVE for temperature intolerance, skin/hair changes  PSYCHIATRIC: NEGATIVE for changes in mood or affect    OBJECTIVE:   Pulse 67   Temp 98  F (36.7  C) (Oral)  "  Resp 17   Ht 1.78 m (5' 10.08\")   Wt 75.9 kg (167 lb 6.4 oz)   SpO2 99%   BMI 23.97 kg/m      Physical Exam  GENERAL: healthy, alert and no distress  HENT: ear canals and TM's normal, nose and mouth without ulcers or lesions  NECK: no adenopathy, no asymmetry, masses, or scars and thyroid normal to palpation  RESP: lungs clear to auscultation - no rales, rhonchi or wheezes  CV: regular rate and rhythm, normal S1 S2, no S3 or S4, no murmur, click or rub, no peripheral edema and peripheral pulses strong  ABDOMEN: soft, nontender, no hepatosplenomegaly, no masses and bowel sounds normal  MS: no gross musculoskeletal defects noted, no edema  SKIN: no suspicious lesions or rashes  NEURO: Normal strength and tone, mentation intact and speech normal  PSYCH: mentation appears normal, affect normal/bright    Diagnostic Test Results:  Labs reviewed in Epic  Orders Placed This Encounter   Procedures     REVIEW OF HEALTH MAINTENANCE PROTOCOL ORDERS     HIV Antigen Antibody Combo     Hepatitis C Screen Reflex to HCV RNA Quant and Genotype     **Hepatic panel FUTURE 2mo       ASSESSMENT/PLAN:   1. Routine general medical examination at a health care facility  Routine preventive care discussed.  - HIV Antigen Antibody Combo; Future  - **Hepatic panel FUTURE 2mo; Future    2. Type 1 diabetes mellitus with hyperglycemia (H)  Follow up with Endocrinology.    3. Need for hepatitis C screening test  Screening  - Hepatitis C Screen Reflex to HCV RNA Quant and Genotype; Future    4. Screening for HIV (human immunodeficiency virus)  Screening      Patient has been advised of split billing requirements and indicates understanding: Yes  COUNSELING:   Reviewed preventive health counseling, as reflected in patient instructions       Regular exercise       Healthy diet/nutrition       Vision screening       Hearing screening       Consider Hep C screening for all patients one time for ages 18-79 years       HIV screeninx in teen " "years, 1x in adult years, and at intervals if high risk    Estimated body mass index is 23.97 kg/m  as calculated from the following:    Height as of this encounter: 1.78 m (5' 10.08\").    Weight as of this encounter: 75.9 kg (167 lb 6.4 oz).     Weight management plan: Discussed healthy diet and exercise guidelines    He reports that he has never smoked. He has never used smokeless tobacco.      Counseling Resources:  ATP IV Guidelines  Pooled Cohorts Equation Calculator  FRAX Risk Assessment  ICSI Preventive Guidelines  Dietary Guidelines for Americans, 2010  USDA's MyPlate  ASA Prophylaxis  Lung CA Screening    Jose Luis Mckee MD  [facility]  "

## 2021-06-11 ENCOUNTER — MYC MEDICAL ADVICE (OUTPATIENT)
Dept: ENDOCRINOLOGY | Facility: CLINIC | Age: 51
End: 2021-06-11

## 2021-06-11 DIAGNOSIS — E10.65 TYPE 1 DIABETES MELLITUS WITH HYPERGLYCEMIA (H): Primary | ICD-10-CM

## 2021-06-11 RX ORDER — INSULIN ADMIN. SUPPLIES
1 INSULIN PEN (EA) SUBCUTANEOUS SEE ADMIN INSTRUCTIONS
Qty: 1 EACH | Refills: 1 | Status: SHIPPED | OUTPATIENT
Start: 2021-06-11 | End: 2021-06-11

## 2021-06-18 DIAGNOSIS — Z00.00 ROUTINE GENERAL MEDICAL EXAMINATION AT A HEALTH CARE FACILITY: ICD-10-CM

## 2021-06-18 DIAGNOSIS — E10.9 TYPE 1 DIABETES MELLITUS WITHOUT COMPLICATION (H): ICD-10-CM

## 2021-06-18 DIAGNOSIS — Z11.59 NEED FOR HEPATITIS C SCREENING TEST: ICD-10-CM

## 2021-06-18 LAB — HBA1C MFR BLD: 6.4 % (ref 0–5.6)

## 2021-06-18 PROCEDURE — 87389 HIV-1 AG W/HIV-1&-2 AB AG IA: CPT | Performed by: FAMILY MEDICINE

## 2021-06-18 PROCEDURE — 84132 ASSAY OF SERUM POTASSIUM: CPT | Performed by: FAMILY MEDICINE

## 2021-06-18 PROCEDURE — 82565 ASSAY OF CREATININE: CPT | Performed by: FAMILY MEDICINE

## 2021-06-18 PROCEDURE — 84443 ASSAY THYROID STIM HORMONE: CPT | Performed by: FAMILY MEDICINE

## 2021-06-18 PROCEDURE — 82306 VITAMIN D 25 HYDROXY: CPT | Performed by: FAMILY MEDICINE

## 2021-06-18 PROCEDURE — 36415 COLL VENOUS BLD VENIPUNCTURE: CPT | Performed by: FAMILY MEDICINE

## 2021-06-18 PROCEDURE — 82043 UR ALBUMIN QUANTITATIVE: CPT | Performed by: INTERNAL MEDICINE

## 2021-06-18 PROCEDURE — 83036 HEMOGLOBIN GLYCOSYLATED A1C: CPT | Performed by: FAMILY MEDICINE

## 2021-06-18 PROCEDURE — 86803 HEPATITIS C AB TEST: CPT | Performed by: FAMILY MEDICINE

## 2021-06-18 PROCEDURE — 80061 LIPID PANEL: CPT | Performed by: FAMILY MEDICINE

## 2021-06-19 LAB
CHOLEST SERPL-MCNC: 121 MG/DL
CREAT SERPL-MCNC: 0.85 MG/DL (ref 0.66–1.25)
CREAT UR-MCNC: 78 MG/DL
GFR SERPL CREATININE-BSD FRML MDRD: >90 ML/MIN/{1.73_M2}
HDLC SERPL-MCNC: 51 MG/DL
LDLC SERPL CALC-MCNC: 63 MG/DL
MICROALBUMIN UR-MCNC: <5 MG/L
MICROALBUMIN/CREAT UR: NORMAL MG/G CR (ref 0–17)
NONHDLC SERPL-MCNC: 70 MG/DL
POTASSIUM SERPL-SCNC: 3.9 MMOL/L (ref 3.4–5.3)
TRIGL SERPL-MCNC: 35 MG/DL
TSH SERPL DL<=0.005 MIU/L-ACNC: 2.39 MU/L (ref 0.4–4)

## 2021-06-21 LAB
HCV AB SERPL QL IA: NONREACTIVE
HIV 1+2 AB+HIV1 P24 AG SERPL QL IA: NONREACTIVE

## 2021-06-22 LAB
DEPRECATED CALCIDIOL+CALCIFEROL SERPL-MC: <44 UG/L (ref 20–75)
VITAMIN D2 SERPL-MCNC: <5 UG/L
VITAMIN D3 SERPL-MCNC: 39 UG/L

## 2021-07-01 ENCOUNTER — MYC MEDICAL ADVICE (OUTPATIENT)
Dept: ENDOCRINOLOGY | Facility: CLINIC | Age: 51
End: 2021-07-01

## 2021-07-01 DIAGNOSIS — E10.65 TYPE 1 DIABETES MELLITUS WITH HYPERGLYCEMIA (H): Primary | ICD-10-CM

## 2021-07-02 NOTE — PROGRESS NOTES
"Outcome for 07/02/21 11:09 AM :Patient is sharing data via clinic device website and patient has been instructed to update data on website. Find login information by using .[email or web address]   danielle Muir  is being evaluated via a billable video visit.      How would you like to obtain your AVS? Speedmenthart  For the video visit, send the invitation by: Wildfire  Will anyone else be joining your video visit? No      Patients Glucose Data is on dexcom     Franklin Muir is a 50 year old male who is being evaluated via a billable video visit.      The patient has been notified of following:     \"This video visit will be conducted via a call between you and your physician/provider. We have found that certain health care needs can be provided without the need for an in-person physical exam.  This service lets us provide the care you need with a video conversation.  If a prescription is necessary we can send it directly to your pharmacy.  If lab work is needed we can place an order for that and you can then stop by our lab to have the test done at a later time.    Video visits are billed at different rates depending on your insurance coverage.  Please reach out to your insurance provider with any questions.    If during the course of the call the physician/provider feels a video visit is not appropriate, you will not be charged for this service.\"    Patient has given verbal consent for Video visit? Yes    Will anyone else be joining your video visit? No        Video-Visit Details    Type of service:  Video Visit    Video Start Time:0934  Video End Time: 0956  Originating Location (pt. Location):home    Distant Location (provider location):  [facility]     Platform used for Video Visit: Instablogs    HPI:   Franklin is a 52 yo man here for follow up of type 1 diabetes, diagnosed in his 30's.  He also sees Dr. Wilson.  He reports that he has been doing well.  He has no concerns today. He is currently taking Tresiba 9 " units daily at 7 AM and Novolog 1 unit/20 grams of CHO with meals and snacks, and for pre-meal correction @ 1/2 U per 30 mg/dL above 150 during the day and over 200 at HS. He has been physically active, doing a lot of running.  He has been largely avoiding hypoglycemia and finds the dexcom helpful at preventing this.   He had a physical/vaccine for COVID.  He is still running quite a bit and feels his diabetes has been very stable. He has been trying to take his Novolog 15 minutes ahead and it doesn't seem to help prevent the post-breakfast spikes.      He just returned from vacation at [facility] for 2 weeks.      Franklin wears a Dexcom G6 sensor with overall average of 159 mg/dL (CV 34%) for the past two weeks. Recent glucose is as follows:             Amos really likes having a sensor and feels it helps him manage his diabetes better.  He has never worn a pump.  He does have strong symptoms alerting him to hypoglycemia.  His brother also had type 1 diabetes and  of DKA.    Franklin also has a history of Hashimoto's ab positivity with normal function, has never been on levothyroxine replacement therapy.  He has no palpitations, but has occasional tightness in his chest, not related to physical activity.  He initially thought it was anxiety, but he is not sure.  He has excellent exercise tolerance and no other associated symptoms.     He generally has been feeling well and has not concerns today.     ROS   GENERAL: no weight loss, weight gain, fevers.     HEENT: no dysphagia, diplopia, neck pain or tenderness, dry/scratchy eyes, URI, cough, sinus drainage, tinnitus, sinus pressure  CV: no chest pain, pressure, palpitations, skipped beats, LOC  LUNGS: no SOB, FOUNTAIN, cough, sputum production, wheezing   GI: no diarrhea, constipation, abdominal pain  EXTREMITIES: no rashes, ulcers, edema  NEUROLOGY: no changes in vision, tingling or numbness in hands or feet.   MSK: no muscle aches or pains, weakness  PSYCH:  mood stable    Past Medical History:   Diagnosis Date     Chronic lymphocytic thyroiditis 7/20/2011     Chronic lymphocytic thyroiditis 7/20/2011     Chronic lymphocytic thyroiditis      Depressive disorder 06/01/1997    Treated for a couple of years; not currently experiencing     Diabetes 6/23/2009       Past Surgical History:   Procedure Laterality Date     HC TOOTH EXTRACTION W/FORCEP       VASECTOMY  01/2019       Family History   Problem Relation Age of Onset     Hyperlipidemia Mother      Diabetes Brother      Hypertension Paternal Grandfather      Cerebrovascular Disease Paternal Grandfather      Hypertension Maternal Grandfather      Hyperlipidemia Maternal Grandfather      Cerebrovascular Disease Maternal Grandfather      Breast Cancer Maternal Aunt      Diabetes Brother      Anxiety Disorder Brother      Coronary Artery Disease Other         Maternal great grandfather     Hypertension Maternal Grandmother      Hyperlipidemia Maternal Grandmother      Hyperlipidemia Brother      Cerebrovascular Disease Paternal Grandmother      Asthma No family hx of      C.A.D. No family hx of      Cancer - colorectal No family hx of      Prostate Cancer No family hx of        Social History     Social History     Marital status:      Spouse name: N/A     Number of children: N/A     Years of education: N/A     Social History Main Topics     Smoking status: Never Smoker     Smokeless tobacco: Never Used     Alcohol use Yes      Comment: ~ 1 drink/day     Drug use: No     Sexual activity: Yes     Partners: Female     Birth control/ protection: Condom, Natural Family Planning      Comment: Would like to talk about vasectomy     Other Topics Concern     Parent/Sibling W/ Cabg, Mi Or Angioplasty Before 65f 55m? No     Social History Narrative   Social Hx:   .  Has a son born in 2011.  Works a  at the [employer]. Now . Physically active- running in the nice weather and  playing soccer with his son.    Current Outpatient Medications   Medication     ACE/ARB NOT PRESCRIBED, INTENTIONAL,     acetone urine (KETOSTIX) test strip     atorvastatin (LIPITOR) 10 MG tablet     blood glucose (NO BRAND SPECIFIED) test strip     blood glucose (NO BRAND SPECIFIED) test strip     blood glucose monitoring (NO BRAND SPECIFIED) meter device kit     blood glucose monitoring (NO BRAND SPECIFIED) meter device kit     Continuous Blood Gluc Sensor (DEXCOM G6 SENSOR) MISC     Continuous Blood Gluc Transmit (DEXCOM G6 TRANSMITTER) MISC     glucagon (GLUCAGON EMERGENCY) 1 MG kit     Glucagon 3 MG/DOSE POWD     insulin degludec (TRESIBA FLEXTOUCH) 100 UNIT/ML pen     insulin pen needle (PENTIPS) 31G X 5 MM miscellaneous     ketoconazole (NIZORAL) 2 % external shampoo     loratadine (CLARITIN) 10 MG tablet     NOVOLOG PENFILL 100 UNIT/ML soln     PRECISION XTRA KETONE STRP     Sodium Fluoride (CLINPRO 5000) 1.1 % PSTE     VITAMIN D, CHOLECALCIFEROL, PO     No current facility-administered medications for this visit.           Allergies   Allergen Reactions     Ceftin Rash     Seasonal Allergies      Ampicillin Rash       Physical Exam  There were no vitals taken for this visit.   GENERAL: healthy, alert and no distress  RESP: no audible wheeze, cough, or visible cyanosis.  No visible retractions or increased work of breathing.  Able to speak fully in complete sentences.  PSYCH: mentation appears normal, affect normal/bright, judgement and insight intact, normal speech and appearance well-groomed  EXTREMITIES: no edema    Physical Exam  There were no vitals taken for this visit.   GENERAL: healthy, alert and no distress  RESP: no audible wheeze, cough, or visible cyanosis.  No visible retractions or increased work of breathing.  Able to speak fully in complete sentences.  PSYCH: mentation appears normal, affect normal/bright, judgement and insight intact, normal speech and appearance well-groomed  EXTREMITIES:  no edema    RESULTS  Lab Results   Component Value Date    A1C 6.4 (H) 06/18/2021    A1C 6.8 (H) 07/03/2020    A1C 8.4 (H) 03/13/2017    A1C 7.6 (H) 02/18/2016    A1C 7.8 (H) 07/07/2015    HEMOGLOBINA1 6.9 (A) 03/10/2020    HEMOGLOBINA1 6.8 (A) 09/10/2019    HEMOGLOBINA1 6.6 (A) 06/03/2019    HEMOGLOBINA1 7.0 (A) 03/12/2019    HEMOGLOBINA1 7.9 (A) 11/13/2018       TSH   Date Value Ref Range Status   06/18/2021 2.39 0.40 - 4.00 mU/L Final   07/03/2020 2.72 0.40 - 4.00 mU/L Final   07/03/2020 2.72 0.40 - 4.00 mU/L Final   05/24/2019 1.89 0.40 - 4.00 mU/L Final   04/18/2018 2.20 0.40 - 4.00 mU/L Final     T4 Total   Date Value Ref Range Status   09/28/2010 9.0 5.0 - 11.0 ug/dL Final     T4 Free   Date Value Ref Range Status   02/18/2016 1.06 0.76 - 1.46 ng/dL Final   07/07/2015 1.04 0.76 - 1.46 ng/dL Final   01/05/2015 1.02 0.76 - 1.46 ng/dL Final     Comment:     Effective 7/30/2014, the reference range for this assay has changed to reflect   new instrumentation/methodology.     12/03/2013 1.08 0.70 - 1.85 ng/dL Final   01/26/2011 1.10 0.70 - 1.85 ng/dL Final       ALT   Date Value Ref Range Status   05/24/2019 25 0 - 70 U/L Final   03/13/2017 24 0 - 70 U/L Final   ]    Recent Labs   Lab Test 06/18/21  0952 07/03/20  0831 01/05/15  0933 01/05/15  0933 12/03/13  0907   CHOL 121 136   < > 134 163   HDL 51 55   < > 47 43   LDL 63 71   < > 76 108   TRIG 35 51   < > 57 57   CHOLHDLRATIO  --   --   --  2.9 3.8    < > = values in this interval not displayed.       Lab Results   Component Value Date     03/13/2017      Lab Results   Component Value Date    POTASSIUM 3.9 06/18/2021     Lab Results   Component Value Date    CHLORIDE 102 03/13/2017     Lab Results   Component Value Date    SHANIKA 9.1 03/13/2017     Lab Results   Component Value Date    CO2 31 03/13/2017     Lab Results   Component Value Date    BUN 15 03/13/2017     Lab Results   Component Value Date    CR 0.85 06/18/2021       GFR Estimate   Date Value Ref  Range Status   06/18/2021 >90 >60 mL/min/[1.73_m2] Final     Comment:     Non  GFR Calc  Starting 12/18/2018, serum creatinine based estimated GFR (eGFR) will be   calculated using the Chronic Kidney Disease Epidemiology Collaboration   (CKD-EPI) equation.     07/03/2020 >90 >60 mL/min/[1.73_m2] Final     Comment:     Non  GFR Calc  Starting 12/18/2018, serum creatinine based estimated GFR (eGFR) will be   calculated using the Chronic Kidney Disease Epidemiology Collaboration   (CKD-EPI) equation.     05/24/2019 >90 >60 mL/min/[1.73_m2] Final     Comment:     Non  GFR Calc  Starting 12/18/2018, serum creatinine based estimated GFR (eGFR) will be   calculated using the Chronic Kidney Disease Epidemiology Collaboration   (CKD-EPI) equation.       GFR Estimate If Black   Date Value Ref Range Status   06/18/2021 >90 >60 mL/min/[1.73_m2] Final     Comment:      GFR Calc  Starting 12/18/2018, serum creatinine based estimated GFR (eGFR) will be   calculated using the Chronic Kidney Disease Epidemiology Collaboration   (CKD-EPI) equation.     07/03/2020 >90 >60 mL/min/[1.73_m2] Final     Comment:      GFR Calc  Starting 12/18/2018, serum creatinine based estimated GFR (eGFR) will be   calculated using the Chronic Kidney Disease Epidemiology Collaboration   (CKD-EPI) equation.     05/24/2019 >90 >60 mL/min/[1.73_m2] Final     Comment:      GFR Calc  Starting 12/18/2018, serum creatinine based estimated GFR (eGFR) will be   calculated using the Chronic Kidney Disease Epidemiology Collaboration   (CKD-EPI) equation.         Lab Results   Component Value Date    MICROL <5 06/18/2021     No results found for: MICROALBUMIN  Lab Results   Component Value Date    CPEPT 2.3 11/19/2009    GADAB >250.0 (H) 11/19/2009    ISCAB  09/08/2009     1:16  Reference range: <1:4  (Note)  TEST INFORMATION: Islet Cell Ab, IgG  Islet cell antibodies  "have been associated with  \"autoimmune\" endocrine disorders and insulin-dependent  diabetes. This disorder is characterized by the presence  of antibodies in patients that may be detected years  before the onset of the clinical symptoms. To calculate  Juvenile Diabetes Foundation (JDF) units: multiply the  titer x 5 (1:8  8 x 5 = 40 JDF Units).  Performed by [facility],  [address] [place] [address],[place] [address] [phone]  [email or web address], Evelia Miles MD, Lab. Director       No results found for: B12]    Most recent eye exam date: : Not Found     Eye exam in January, 2021- no retinopathy.    Assessment/Plan:     1.  Type 1 diabetes- Franklin's glucose control is excellent. A1c is 6.4%.  His variability is low with very little hypoglycemia.  He does have a consistent glucose spike after breakfast despite increasing his bolus and pre-bolusing 15 minutes ahead of his meals.  I did suggest he could try 30 minute pre-bolus, or just taking a 0.5-1 unit bolus upon waking, if he is concerned.  No other changes.  He is doing quite well.     2.  Risk factors-     Retinopathy:  No.  Had eye exam in January, 2021.    Nephropathy:  BP historically well controlled. No microalbuminuria.  Creatinine stable.   Neuropathy: No.    Feet: OK, no ulcers.   Lipids:  LDL at target.  Patient taking statin  Celiac screening: negative 2018    3.  Positive TPO antibodies-TSH normal on no therapy.      4. F/U in 3 months with Dr. Wilson, 6 months with me in person.    34 minutes spent on the date of the encounter doing chart review, review of test results, review of continuous glucose sensor, interpretation of glucose data, patient visit and documentation, counseling/coordination of care, and discussion of follow up plan for worsening hyper and hypoglycemia.  The patient understood and is satisfied with today's visit.      Liz Conroy PA-C, MPAS   [facility]  Department of Medicine  Division of Endocrinology and " Diabetes

## 2021-07-02 NOTE — PATIENT INSTRUCTIONS
We appreciate your assistance in coordinating your healthcare.     Please upload your insulin pump, blood sugar meter and/or continuous glucose monitor at home 1-2 days before your next diabetes-related appointment.   This will allow your provider to review your  data before your scheduled virtual visit.    To ask a question to your Endocrine care team, please send them a [employer] message, or reach them by phone at [phone]     To expedite your medication refill(s), please contact your pharmacy and have them   fax a refill request to: [phone].  *Please allow 3 business days for routine medication refills.  *Please allow 5 business days for controlled substance medication refills.    For after-hours urgent Endocrine issues, that do not require 191, please dial [phone], and ask to speak with the Endocrinologist On-Call

## 2021-07-05 ENCOUNTER — VIRTUAL VISIT (OUTPATIENT)
Dept: ENDOCRINOLOGY | Facility: CLINIC | Age: 51
End: 2021-07-05
Payer: COMMERCIAL

## 2021-07-05 DIAGNOSIS — E10.9 WELL CONTROLLED TYPE 1 DIABETES MELLITUS (H): Primary | ICD-10-CM

## 2021-07-05 PROCEDURE — 99214 OFFICE O/P EST MOD 30 MIN: CPT | Mod: 95 | Performed by: PHYSICIAN ASSISTANT

## 2021-07-05 NOTE — LETTER
"7/5/2021       RE: Franklin Muir  [address]  [place] [address]     Dear Colleague,    Thank you for referring your patient, Franklin Muir, to the [facility] [place] [facility]. Please see a copy of my visit note below.    Outcome for 07/02/21 11:09 AM :Patient is sharing data via clinic device website and patient has been instructed to update data on website. Find login information by using .Amiato   dexcom  Franklin Muir  is being evaluated via a billable video visit.      How would you like to obtain your AVS? [email or web address]!  For the video visit, send the invitation by: GoCoin  Will anyone else be joining your video visit? No      Patients Glucose Data is on dexcom     Franklin Muir is a 50 year old male who is being evaluated via a billable video visit.      The patient has been notified of following:     \"This video visit will be conducted via a call between you and your physician/provider. We have found that certain health care needs can be provided without the need for an in-person physical exam.  This service lets us provide the care you need with a video conversation.  If a prescription is necessary we can send it directly to your pharmacy.  If lab work is needed we can place an order for that and you can then stop by our lab to have the test done at a later time.    Video visits are billed at different rates depending on your insurance coverage.  Please reach out to your insurance provider with any questions.    If during the course of the call the physician/provider feels a video visit is not appropriate, you will not be charged for this service.\"    Patient has given verbal consent for Video visit? Yes    Will anyone else be joining your video visit? No        Video-Visit Details    Type of service:  Video Visit    Video Start Time:0934  Video End Time: 0956  Originating Location (pt. Location):home    Distant " Location (provider location):  [facility] END[facility]     Platform used for Video Visit: Jacqueline    HPI:   Franklin is a 52 yo man here for follow up of type 1 diabetes, diagnosed in his 30's.  He also sees Dr. Wilson.  He reports that he has been doing well.  He has no concerns today. He is currently taking Tresiba 9 units daily at 7 AM and Novolog 1 unit/20 grams of CHO with meals and snacks, and for pre-meal correction @ 1/2 U per 30 mg/dL above 150 during the day and over 200 at HS. He has been physically active, doing a lot of running.  He has been largely avoiding hypoglycemia and finds the dexcom helpful at preventing this.   He had a physical/vaccine for COVID.  He is still running quite a bit and feels his diabetes has been very stable. He has been trying to take his Novolog 15 minutes ahead and it doesn't seem to help prevent the post-breakfast spikes.      He just returned from vacation at [place]. \[facility].\"" for 2 weeks.      Franklin wears a Dexcom G6 sensor with overall average of 159 mg/dL (CV 34%) for the past two weeks. Recent glucose is as follows:             Amos really likes having a sensor and feels it helps him manage his diabetes better.  He has never worn a pump.  He does have strong symptoms alerting him to hypoglycemia.  His brother also had type 1 diabetes and  of DKA.    Franklin also has a history of Hashimoto's ab positivity with normal function, has never been on levothyroxine replacement therapy.  He has no palpitations, but has occasional tightness in his chest, not related to physical activity.  He initially thought it was anxiety, but he is not sure.  He has excellent exercise tolerance and no other associated symptoms.     He generally has been feeling well and has not concerns today.     ROS   GENERAL: no weight loss, weight gain, fevers.     HEENT: no dysphagia, diplopia, neck pain or tenderness, dry/scratchy eyes, URI, cough, sinus drainage, tinnitus, sinus pressure  CV: no  chest pain, pressure, palpitations, skipped beats, LOC  LUNGS: no SOB, FOUNTAIN, cough, sputum production, wheezing   GI: no diarrhea, constipation, abdominal pain  EXTREMITIES: no rashes, ulcers, edema  NEUROLOGY: no changes in vision, tingling or numbness in hands or feet.   MSK: no muscle aches or pains, weakness  PSYCH: mood stable    Past Medical History:   Diagnosis Date     Chronic lymphocytic thyroiditis 7/20/2011     Chronic lymphocytic thyroiditis 7/20/2011     Chronic lymphocytic thyroiditis      Depressive disorder 06/01/1997    Treated for a couple of years; not currently experiencing     Diabetes 6/23/2009       Past Surgical History:   Procedure Laterality Date     HC TOOTH EXTRACTION W/FORCEP       VASECTOMY  01/2019       Family History   Problem Relation Age of Onset     Hyperlipidemia Mother      Diabetes Brother      Hypertension Paternal Grandfather      Cerebrovascular Disease Paternal Grandfather      Hypertension Maternal Grandfather      Hyperlipidemia Maternal Grandfather      Cerebrovascular Disease Maternal Grandfather      Breast Cancer Maternal Aunt      Diabetes Brother      Anxiety Disorder Brother      Coronary Artery Disease Other         Maternal great grandfather     Hypertension Maternal Grandmother      Hyperlipidemia Maternal Grandmother      Hyperlipidemia Brother      Cerebrovascular Disease Paternal Grandmother      Asthma No family hx of      C.A.D. No family hx of      Cancer - colorectal No family hx of      Prostate Cancer No family hx of        Social History     Social History     Marital status:      Spouse name: N/A     Number of children: N/A     Years of education: N/A     Social History Main Topics     Smoking status: Never Smoker     Smokeless tobacco: Never Used     Alcohol use Yes      Comment: ~ 1 drink/day     Drug use: No     Sexual activity: Yes     Partners: Female     Birth control/ protection: Condom, Natural Family Planning      Comment: Would like to  talk about vasectomy     Other Topics Concern     Parent/Sibling W/ Cabg, Mi Or Angioplasty Before 65f 55m? No     Social History Narrative   Social Hx:   .  Has a son born in 2011.  Works a  at the [employer]. Now . Physically active- running in the nice weather and playing soccer with his son.    Current Outpatient Medications   Medication     ACE/ARB NOT PRESCRIBED, INTENTIONAL,     acetone urine (KETOSTIX) test strip     atorvastatin (LIPITOR) 10 MG tablet     blood glucose (NO BRAND SPECIFIED) test strip     blood glucose (NO BRAND SPECIFIED) test strip     blood glucose monitoring (NO BRAND SPECIFIED) meter device kit     blood glucose monitoring (NO BRAND SPECIFIED) meter device kit     Continuous Blood Gluc Sensor (DEXCOM G6 SENSOR) MISC     Continuous Blood Gluc Transmit (DEXCOM G6 TRANSMITTER) MISC     glucagon (GLUCAGON EMERGENCY) 1 MG kit     Glucagon 3 MG/DOSE POWD     insulin degludec (TRESIBA FLEXTOUCH) 100 UNIT/ML pen     insulin pen needle (PENTIPS) 31G X 5 MM miscellaneous     ketoconazole (NIZORAL) 2 % external shampoo     loratadine (CLARITIN) 10 MG tablet     NOVOLOG PENFILL 100 UNIT/ML soln     PRECISION XTRA KETONE STRP     Sodium Fluoride (CLINPRO 5000) 1.1 % PSTE     VITAMIN D, CHOLECALCIFEROL, PO     No current facility-administered medications for this visit.           Allergies   Allergen Reactions     Ceftin Rash     Seasonal Allergies      Ampicillin Rash       Physical Exam  There were no vitals taken for this visit.   GENERAL: healthy, alert and no distress  RESP: no audible wheeze, cough, or visible cyanosis.  No visible retractions or increased work of breathing.  Able to speak fully in complete sentences.  PSYCH: mentation appears normal, affect normal/bright, judgement and insight intact, normal speech and appearance well-groomed  EXTREMITIES: no edema    Physical Exam  There were no vitals taken for this visit.   GENERAL:  healthy, alert and no distress  RESP: no audible wheeze, cough, or visible cyanosis.  No visible retractions or increased work of breathing.  Able to speak fully in complete sentences.  PSYCH: mentation appears normal, affect normal/bright, judgement and insight intact, normal speech and appearance well-groomed  EXTREMITIES: no edema    RESULTS  Lab Results   Component Value Date    A1C 6.4 (H) 06/18/2021    A1C 6.8 (H) 07/03/2020    A1C 8.4 (H) 03/13/2017    A1C 7.6 (H) 02/18/2016    A1C 7.8 (H) 07/07/2015    HEMOGLOBINA1 6.9 (A) 03/10/2020    HEMOGLOBINA1 6.8 (A) 09/10/2019    HEMOGLOBINA1 6.6 (A) 06/03/2019    HEMOGLOBINA1 7.0 (A) 03/12/2019    HEMOGLOBINA1 7.9 (A) 11/13/2018       TSH   Date Value Ref Range Status   06/18/2021 2.39 0.40 - 4.00 mU/L Final   07/03/2020 2.72 0.40 - 4.00 mU/L Final   07/03/2020 2.72 0.40 - 4.00 mU/L Final   05/24/2019 1.89 0.40 - 4.00 mU/L Final   04/18/2018 2.20 0.40 - 4.00 mU/L Final     T4 Total   Date Value Ref Range Status   09/28/2010 9.0 5.0 - 11.0 ug/dL Final     T4 Free   Date Value Ref Range Status   02/18/2016 1.06 0.76 - 1.46 ng/dL Final   07/07/2015 1.04 0.76 - 1.46 ng/dL Final   01/05/2015 1.02 0.76 - 1.46 ng/dL Final     Comment:     Effective 7/30/2014, the reference range for this assay has changed to reflect   new instrumentation/methodology.     12/03/2013 1.08 0.70 - 1.85 ng/dL Final   01/26/2011 1.10 0.70 - 1.85 ng/dL Final       ALT   Date Value Ref Range Status   05/24/2019 25 0 - 70 U/L Final   03/13/2017 24 0 - 70 U/L Final   ]    Recent Labs   Lab Test 06/18/21  0952 07/03/20  0831 01/05/15  0933 01/05/15  0933 12/03/13  0907   CHOL 121 136   < > 134 163   HDL 51 55   < > 47 43   LDL 63 71   < > 76 108   TRIG 35 51   < > 57 57   CHOLHDLRATIO  --   --   --  2.9 3.8    < > = values in this interval not displayed.       Lab Results   Component Value Date     03/13/2017      Lab Results   Component Value Date    POTASSIUM 3.9 06/18/2021     Lab Results    Component Value Date    CHLORIDE 102 03/13/2017     Lab Results   Component Value Date    SHANIKA 9.1 03/13/2017     Lab Results   Component Value Date    CO2 31 03/13/2017     Lab Results   Component Value Date    BUN 15 03/13/2017     Lab Results   Component Value Date    CR 0.85 06/18/2021       GFR Estimate   Date Value Ref Range Status   06/18/2021 >90 >60 mL/min/[1.73_m2] Final     Comment:     Non  GFR Calc  Starting 12/18/2018, serum creatinine based estimated GFR (eGFR) will be   calculated using the Chronic Kidney Disease Epidemiology Collaboration   (CKD-EPI) equation.     07/03/2020 >90 >60 mL/min/[1.73_m2] Final     Comment:     Non  GFR Calc  Starting 12/18/2018, serum creatinine based estimated GFR (eGFR) will be   calculated using the Chronic Kidney Disease Epidemiology Collaboration   (CKD-EPI) equation.     05/24/2019 >90 >60 mL/min/[1.73_m2] Final     Comment:     Non  GFR Calc  Starting 12/18/2018, serum creatinine based estimated GFR (eGFR) will be   calculated using the Chronic Kidney Disease Epidemiology Collaboration   (CKD-EPI) equation.       GFR Estimate If Black   Date Value Ref Range Status   06/18/2021 >90 >60 mL/min/[1.73_m2] Final     Comment:      GFR Calc  Starting 12/18/2018, serum creatinine based estimated GFR (eGFR) will be   calculated using the Chronic Kidney Disease Epidemiology Collaboration   (CKD-EPI) equation.     07/03/2020 >90 >60 mL/min/[1.73_m2] Final     Comment:      GFR Calc  Starting 12/18/2018, serum creatinine based estimated GFR (eGFR) will be   calculated using the Chronic Kidney Disease Epidemiology Collaboration   (CKD-EPI) equation.     05/24/2019 >90 >60 mL/min/[1.73_m2] Final     Comment:      GFR Calc  Starting 12/18/2018, serum creatinine based estimated GFR (eGFR) will be   calculated using the Chronic Kidney Disease Epidemiology Collaboration   (CKD-EPI)  "equation.         Lab Results   Component Value Date    MICROL <5 06/18/2021     No results found for: MICROALBUMIN  Lab Results   Component Value Date    CPEPT 2.3 11/19/2009    GADAB >250.0 (H) 11/19/2009    ISCAB  09/08/2009     1:16  Reference range: <1:4  (Note)  TEST INFORMATION: Islet Cell Ab, IgG  Islet cell antibodies have been associated with  \"autoimmune\" endocrine disorders and insulin-dependent  diabetes. This disorder is characterized by the presence  of antibodies in patients that may be detected years  before the onset of the clinical symptoms. To calculate  Juvenile Diabetes Foundation (JDF) units: multiply the  titer x 5 (1:8  8 x 5 = 40 JDF Units).  Performed by [facility],  [address] [place] [address],[place] [address] [phone]  [email or web address], Evelia Miles MD, Lab. Director       No results found for: B12]    Most recent eye exam date: : Not Found     Eye exam in January, 2021- no retinopathy.    Assessment/Plan:     1.  Type 1 diabetes- Franklin's glucose control is excellent. A1c is 6.4%.  His variability is low with very little hypoglycemia.  He does have a consistent glucose spike after breakfast despite increasing his bolus and pre-bolusing 15 minutes ahead of his meals.  I did suggest he could try 30 minute pre-bolus, or just taking a 0.5-1 unit bolus upon waking, if he is concerned.  No other changes.  He is doing quite well.     2.  Risk factors-     Retinopathy:  No.  Had eye exam in January, 2021.    Nephropathy:  BP historically well controlled. No microalbuminuria.  Creatinine stable.   Neuropathy: No.    Feet: OK, no ulcers.   Lipids:  LDL at target.  Patient taking statin  Celiac screening: negative 2018    3.  Positive TPO antibodies-TSH normal on no therapy.      4. F/U in 3 months with Dr. Wilson, 6 months with me in person.    34 minutes spent on the date of the encounter doing chart review, review of test results, review of continuous glucose sensor, interpretation of " glucose data, patient visit and documentation, counseling/coordination of care, and discussion of follow up plan for worsening hyper and hypoglycemia.  The patient understood and is satisfied with today's visit.        Liz Conroy PA-C, MPAS   [facility]  Department of Medicine  Division of Endocrinology and Diabetes

## 2021-07-13 ENCOUNTER — MYC MEDICAL ADVICE (OUTPATIENT)
Dept: ENDOCRINOLOGY | Facility: CLINIC | Age: 51
End: 2021-07-13

## 2021-07-13 DIAGNOSIS — E78.2 MIXED HYPERLIPIDEMIA: ICD-10-CM

## 2021-07-13 RX ORDER — ATORVASTATIN CALCIUM 10 MG/1
10 TABLET, FILM COATED ORAL DAILY
Qty: 90 TABLET | Refills: 3 | Status: SHIPPED | OUTPATIENT
Start: 2021-07-13 | End: 2022-07-05

## 2021-09-19 ENCOUNTER — HEALTH MAINTENANCE LETTER (OUTPATIENT)
Age: 51
End: 2021-09-19

## 2021-09-21 ENCOUNTER — VIRTUAL VISIT (OUTPATIENT)
Dept: ENDOCRINOLOGY | Facility: CLINIC | Age: 51
End: 2021-09-21
Payer: COMMERCIAL

## 2021-09-21 DIAGNOSIS — E10.9 TYPE 1 DIABETES MELLITUS WITHOUT COMPLICATION (H): Primary | ICD-10-CM

## 2021-09-21 DIAGNOSIS — E78.2 MIXED HYPERLIPIDEMIA: ICD-10-CM

## 2021-09-21 PROCEDURE — 99215 OFFICE O/P EST HI 40 MIN: CPT | Mod: 95 | Performed by: INTERNAL MEDICINE

## 2021-09-21 NOTE — PROGRESS NOTES
Diabetes and endocrinology outpatient clinic    Franklin Muir  is being evaluated via a billable video visit.      How would you like to obtain your AVS? Talita  For the video visit, send the invitation by: talita  Will anyone else be joining your video visit? No      Franklin Muir MRN:[id number] YOB: 1970  Primary care provider: Ruddy Malave     Franklin Muir is a 51 year old male who is being evaluated via a billable video visit.      Due to the COVID 19 pandemic this visit was a video visit in order to help prevent spread of infection in this high risk patient and the general population.         HPI:  Franklin Muir is a very pleasant 51 year old male with a past medical history of type 1 DM.  Diabetes was diagnosed about 11 years ago who has a visit today for follow up on type 1 diabetes.     Franklin reports he is feeling well and feels that his blood glucose levels are under good control.  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  He purposefully eats more carbohydrates before starting his bike rides.  He has been running between 6:30 and 7:30 daily    He continues to follow the CDC recommendations for prevention and mitigation of coronavirus and he received the COVID-19 vaccine (Pfizer).    He  continues on Tresiba 9 units daily at 7 AM. Novolog 1 U /20 g of CHO with meals and snacks and premeals correction with 0.5 U per 30 mg/dL above 150 mg/dL during the day and over 200 mg/dL HS.      He reports a few episodes of hypoglycemia after he runs despite taking additional carbohydrates before running.  Franklin develops hypoglycemic symptoms when his blood glucose levels are around 70 mg/dL.  He takes lifesavers with him and use them as needed.    He is wearing Dexcom G6 sensor 93% of the time               Late lunch at work, sometimes without an  opportunity to bolus before eating            Mountain biking all Sundays in the afternoon, for 90 min         Dexcom alert settings are as follows:        Chronic complications of diabetes:  Retinopathy:  None.  Had dermatology appointment in January 2021.  We will send a copy of the report via [employer].    Nephropathy:  No microalbuminuria. GFR greater than 90 mL/min per 1.73 m .   Neuropathy: No  Cardiovascular risk factors: Normal blood pressure, on atorvastatin 10 mg daily      Hashimoto's thyroiditis: Franklin also has a history of positive thyroid antibodies compatible with autoimmune thyroid disease with normal thyroid function.  He has never been on levothyroxine replacement therapy.   He denies any symptoms suggestive of hypo-or hyperthyroidism.  No constipation, fatigue, or swelling.     Review Of Systems  12 point ROS negative as detailed below, unless noted above    Answers for HPI/ROS submitted by the patient on 9/20/2021  General Symptoms: No  Skin Symptoms: No  HENT Symptoms: No  EYE SYMPTOMS: No  HEART SYMPTOMS: No  LUNG SYMPTOMS: No  INTESTINAL SYMPTOMS: No  URINARY SYMPTOMS: No  REPRODUCTIVE SYMPTOMS: No  SKELETAL SYMPTOMS: No  BLOOD SYMPTOMS: No  NERVOUS SYSTEM SYMPTOMS: No  MENTAL HEALTH SYMPTOMS: No    Social Hx:   . Has a young son, in 5th grade.   Works as a  at the [employer].  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     Past Medical History  Past Medical History:   Diagnosis Date     Chronic lymphocytic thyroiditis 7/20/2011     Chronic lymphocytic thyroiditis 7/20/2011     Chronic lymphocytic thyroiditis      Depressive disorder 06/01/1997    Treated for a couple of years; not currently experiencing     Diabetes 6/23/2009       Past Surgical History  Past Surgical History:   Procedure Laterality Date     HC TOOTH EXTRACTION W/FORCEP       VASECTOMY  01/2019      Family History  Family History   Problem Relation Age of Onset     Hyperlipidemia Mother       Diabetes Brother      Hypertension Paternal Grandfather      Cerebrovascular Disease Paternal Grandfather      Hypertension Maternal Grandfather      Hyperlipidemia Maternal Grandfather      Cerebrovascular Disease Maternal Grandfather      Breast Cancer Maternal Aunt      Diabetes Brother      Anxiety Disorder Brother      Coronary Artery Disease Other         Maternal great grandfather     Hypertension Maternal Grandmother      Hyperlipidemia Maternal Grandmother      Hyperlipidemia Brother      Cerebrovascular Disease Paternal Grandmother      Asthma No family hx of      C.A.D. No family hx of      Cancer - colorectal No family hx of      Prostate Cancer No family hx of      Brother had T1D  at age 26 from DKA  Children: 1 son, 10 years old, healthy    Medications  Current Outpatient Medications   Medication Sig Dispense Refill     ACE/ARB NOT PRESCRIBED, INTENTIONAL, by Other route continuous prn.       acetone urine (KETOSTIX) test strip Use as directed in case of high blood sugar or illness. 25 each 3     atorvastatin (LIPITOR) 10 MG tablet Take 1 tablet (10 mg) by mouth daily 90 tablet 3     blood glucose (NO BRAND SPECIFIED) test strip Use to test blood sugar 1 times daily or as directed. Any covered brand. 100 strip 11     blood glucose (NO BRAND SPECIFIED) test strip Use to test blood sugar 1 times daily or as directed. 100 strip 3     blood glucose monitoring (NO BRAND SPECIFIED) meter device kit Use to test blood sugar up to 4 times daily or as directed. Any covered brand. 1 kit 0     blood glucose monitoring (NO BRAND SPECIFIED) meter device kit Use to test blood sugar 1 times daily or as directed. 1 kit 1     Continuous Blood Gluc Sensor (DEXCOM G6 SENSOR) MISC 1 each every 10 days 10 each 3     Continuous Blood Gluc Transmit (DEXCOM G6 TRANSMITTER) MISC 1 each every 3 months 1 each 3     glucagon (GLUCAGON EMERGENCY) 1 MG kit To treat very low blood sugar in an emergency Use as directed 2  each 2     Glucagon 3 MG/DOSE POWD Spray 3 mg in nostril as needed (Hypoglycemia) 3 each 3     insulin degludec (TRESIBA FLEXTOUCH) 100 UNIT/ML pen Inject 9 units subcutaneously daily 9 mL 3     insulin pen needle (PENTIPS) 31G X 5 MM miscellaneous Use 5 daily or as directed. 500 each 3     ketoconazole (NIZORAL) 2 % external shampoo   10     loratadine (CLARITIN) 10 MG tablet        NOVOLOG PENFILL 100 UNIT/ML soln 1 unit:15  grams of CHO with meals and snacks, and for pre-meal correction, approx 40 units daily 45 mL 3     PRECISION XTRA KETONE STRP Use as directed in case of high blood sugar or illness. 25 each 3     Sodium Fluoride (CLINPRO 5000) 1.1 % PSTE Apply  to affected area. Use once every night       VITAMIN D, CHOLECALCIFEROL, PO Take 1,000 Units by mouth daily         Physical Examination:  GENERAL: Healthy, alert and no distress  EYES: Eyes grossly normal to inspection.  No discharge or erythema, or obvious scleral/conjunctival abnormalities.  Wears glasses  HENT: Normal cephalic/atraumatic.  External ears, nose and mouth without ulcers or lesions.  No nasal drainage visible.  NECK: No asymmetry, visible masses or scars  RESP: No audible wheeze, cough, or visible cyanosis.  No visible retractions or increased work of breathing.    MS: No gross musculoskeletal defects noted.  Normal range of motion.  No visible edema.  SKIN: Visible skin clear. No significant rash, abnormal pigmentation or lesions.  NEURO: Cranial nerves grossly intact.  Mentation and speech appropriate for age.  PSYCH: Mentation appears normal, affect normal/bright, judgement and insight intact, normal speech and appearance well-groomed.      Endocrine Labs:  Lab Results   Component Value Date    A1C 6.8 07/03/2020    A1C 8.4 03/13/2017    A1C 7.6 02/18/2016    A1C 7.8 07/07/2015    A1C 6.9 12/03/2013     TSH   Date Value Ref Range Status   06/18/2021 2.39 0.40 - 4.00 mU/L Final             Assessment and Plan:   Franklin Muir is a very  pleasant and well articulated 51 years old male with a 11 years history of type 1 diabetes and positive thyroid antibodies.      1. Type 1 diabetes mellitus without complication (H)  Blood glucose levels remain well controlled.  We have discussed strategies which may help to further improve his blood glucose control.  Franklin will plan to split his carb intake prior to biking so that he has some carbs before he started biking and he has additional carbohydrates 30 to 45 minutes routine his bike ride.  He will also plan to his NovoLog 15 minutes prior to starting his lunch. Patient is agreeable with this plan.      2.  Chronic complications of diabetes  Retinopathy:  None.  Had dermatology appointment in January 2021.  We will send a copy of the report via [employer].    Nephropathy:  BP well controlled. No microalbuminuria.  Creatinine stable.   Neuropathy: No.      3.  Cardiovascular risk factors  Lipids:  LDL at target.  Patient taking statin  Blood pressure: Patient's blood pressure has been normal in the past.  Most recent recorded value was 102/52 mmHg.  Not on blood pressure medications     4.  Positive TPO antibodies  TSH was normal June 2021. No further work up needed today.  We will check TSH once a year or sooner if the patient develops symptoms suggestive of hypo- or hyperthyroidism.    5.  COVID-19 pandemic  The patient will continue to follow the CDC recommendations on prevention and mitigation of COVID-19.      6. Health maintenance: Patient is on vitamin D replacement therapy.  Vitamin D levels are normal in June 2021.    Return to clinic with Liz Conroy in 3 months and with me in 6 months.  He will do labs prior to his next visit      No orders of the defined types were placed in this encounter.    Patient Instructions   -Continue Tresiba 9 units subcutaneous in the morning  -Continue NovoLog 1 unit per 20 g of carbohydrates with meals and snacks  -Please plan to take your NovoLog 15 minutes prior to  your meals  -Continue NovoLog 0.5 units per 30 mg/dL above 150 mg/dL during the day and over 200 mg/dL at night.  -Please consider splitting your carbohydrates prior to biking so that you consume some at the beginning of the right into the remaining 30 to 45 minutes later   -Our clinic staff will reach out to schedule a follow-up appointment with myself or with Liz Conroy in 3 months   -Please continue to follow the CDC indications for prevention and mitigation of COVID-19.   -Congratulations again excellent care of your diabetes.    It was a pleasure to see you today.  Please feel free to contact our clinic if you have any questions or concerns.  Sincerely,    Freya Wilson MD PhD    Division of Endocrinology and Diabetes         Due to the COVID 19 pandemic this visit was a video visit. The patient gave verbal consent for the visit today.  I have independently reviewed and interpreted labs, imaging as indicated.        Video Start time: 9: 30AM  Video Stop time: 10:00 AM    46 minutes spent on the date of the encounter doing chart review, history and exam, documentation and further activities as noted above    Freya Wilson MD PhD    Division of Endocrinology and Diabetes

## 2021-09-21 NOTE — LETTER
9/21/2021       RE: Franklin Muir  [address]  [place] [address]     Dear Colleague,    Thank you for referring your patient, Franklin Muir, to the [facility] [place] [facility]. Please see a copy of my visit note below.    Diabetes and endocrinology outpatient clinic    Franklin Muir  is being evaluated via a billable video visit.      How would you like to obtain your AVS? Talita  For the video visit, send the invitation by: talita  Will anyone else be joining your video visit? No      Franklin Muir MRN:[id number] YOB: 1970  Primary care provider: Ruddy Malave     Franklin Muir is a 51 year old male who is being evaluated via a billable video visit.      Due to the COVID 19 pandemic this visit was a video visit in order to help prevent spread of infection in this high risk patient and the general population.         HPI:  Franklin Muir is a very pleasant 51 year old male with a past medical history of type 1 DM.  Diabetes was diagnosed about 11 years ago who has a visit today for follow up on type 1 diabetes.     Franklin reports he is feeling well and feels that his blood glucose levels are under good control.  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  He purposefully eats more carbohydrates before starting his bike rides.  He has been running between 6:30 and 7:30 daily    He continues to follow the CDC recommendations for prevention and mitigation of coronavirus and he received the COVID-19 vaccine (Pfizer).    He  continues on Tresiba 9 units daily at 7 AM. Novolog 1 U /20 g of CHO with meals and snacks and premeals correction with 0.5 U per 30 mg/dL above 150 mg/dL during the day and over 200 mg/dL HS.      He reports a few episodes of hypoglycemia after  he runs despite taking additional carbohydrates before running.  Franklin develops hypoglycemic symptoms when his blood glucose levels are around 70 mg/dL.  He takes lifesavers with him and use them as needed.    He is wearing Dexcom G6 sensor 93% of the time               Late lunch at work, sometimes without an opportunity to bolus before eating            Mountain biking all Sundays in the afternoon, for 90 min         Dexcom alert settings are as follows:        Chronic complications of diabetes:  Retinopathy:  None.  Had dermatology appointment in January 2021.  We will send a copy of the report via [employer].    Nephropathy:  No microalbuminuria. GFR greater than 90 mL/min per 1.73 m .   Neuropathy: No  Cardiovascular risk factors: Normal blood pressure, on atorvastatin 10 mg daily      Hashimoto's thyroiditis: Franklin also has a history of positive thyroid antibodies compatible with autoimmune thyroid disease with normal thyroid function.  He has never been on levothyroxine replacement therapy.   He denies any symptoms suggestive of hypo-or hyperthyroidism.  No constipation, fatigue, or swelling.     Review Of Systems  12 point ROS negative as detailed below, unless noted above    Answers for HPI/ROS submitted by the patient on 9/20/2021  General Symptoms: No  Skin Symptoms: No  HENT Symptoms: No  EYE SYMPTOMS: No  HEART SYMPTOMS: No  LUNG SYMPTOMS: No  INTESTINAL SYMPTOMS: No  URINARY SYMPTOMS: No  REPRODUCTIVE SYMPTOMS: No  SKELETAL SYMPTOMS: No  BLOOD SYMPTOMS: No  NERVOUS SYSTEM SYMPTOMS: No  MENTAL HEALTH SYMPTOMS: No    Social Hx:   . Has a young son, in 5th grade.   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     Past Medical History  Past Medical History:   Diagnosis Date     Chronic lymphocytic thyroiditis 7/20/2011     Chronic lymphocytic thyroiditis 7/20/2011     Chronic lymphocytic thyroiditis      Depressive disorder 06/01/1997     Treated for a couple of years; not currently experiencing     Diabetes 2009       Past Surgical History  Past Surgical History:   Procedure Laterality Date     HC TOOTH EXTRACTION W/FORCEP       VASECTOMY  2019      Family History  Family History   Problem Relation Age of Onset     Hyperlipidemia Mother      Diabetes Brother      Hypertension Paternal Grandfather      Cerebrovascular Disease Paternal Grandfather      Hypertension Maternal Grandfather      Hyperlipidemia Maternal Grandfather      Cerebrovascular Disease Maternal Grandfather      Breast Cancer Maternal Aunt      Diabetes Brother      Anxiety Disorder Brother      Coronary Artery Disease Other         Maternal great grandfather     Hypertension Maternal Grandmother      Hyperlipidemia Maternal Grandmother      Hyperlipidemia Brother      Cerebrovascular Disease Paternal Grandmother      Asthma No family hx of      C.A.D. No family hx of      Cancer - colorectal No family hx of      Prostate Cancer No family hx of      Brother had T1D  at age 26 from DKA  Children: 1 son, 10 years old, healthy    Medications  Current Outpatient Medications   Medication Sig Dispense Refill     ACE/ARB NOT PRESCRIBED, INTENTIONAL, by Other route continuous prn.       acetone urine (KETOSTIX) test strip Use as directed in case of high blood sugar or illness. 25 each 3     atorvastatin (LIPITOR) 10 MG tablet Take 1 tablet (10 mg) by mouth daily 90 tablet 3     blood glucose (NO BRAND SPECIFIED) test strip Use to test blood sugar 1 times daily or as directed. Any covered brand. 100 strip 11     blood glucose (NO BRAND SPECIFIED) test strip Use to test blood sugar 1 times daily or as directed. 100 strip 3     blood glucose monitoring (NO BRAND SPECIFIED) meter device kit Use to test blood sugar up to 4 times daily or as directed. Any covered brand. 1 kit 0     blood glucose monitoring (NO BRAND SPECIFIED) meter device kit Use to test blood sugar 1 times daily  or as directed. 1 kit 1     Continuous Blood Gluc Sensor (DEXCOM G6 SENSOR) MISC 1 each every 10 days 10 each 3     Continuous Blood Gluc Transmit (DEXCOM G6 TRANSMITTER) MISC 1 each every 3 months 1 each 3     glucagon (GLUCAGON EMERGENCY) 1 MG kit To treat very low blood sugar in an emergency Use as directed 2 each 2     Glucagon 3 MG/DOSE POWD Spray 3 mg in nostril as needed (Hypoglycemia) 3 each 3     insulin degludec (TRESIBA FLEXTOUCH) 100 UNIT/ML pen Inject 9 units subcutaneously daily 9 mL 3     insulin pen needle (PENTIPS) 31G X 5 MM miscellaneous Use 5 daily or as directed. 500 each 3     ketoconazole (NIZORAL) 2 % external shampoo   10     loratadine (CLARITIN) 10 MG tablet        NOVOLOG PENFILL 100 UNIT/ML soln 1 unit:15  grams of CHO with meals and snacks, and for pre-meal correction, approx 40 units daily 45 mL 3     PRECISION XTRA KETONE STRP Use as directed in case of high blood sugar or illness. 25 each 3     Sodium Fluoride (CLINPRO 5000) 1.1 % PSTE Apply  to affected area. Use once every night       VITAMIN D, CHOLECALCIFEROL, PO Take 1,000 Units by mouth daily         Physical Examination:  GENERAL: Healthy, alert and no distress  EYES: Eyes grossly normal to inspection.  No discharge or erythema, or obvious scleral/conjunctival abnormalities.  Wears glasses  HENT: Normal cephalic/atraumatic.  External ears, nose and mouth without ulcers or lesions.  No nasal drainage visible.  NECK: No asymmetry, visible masses or scars  RESP: No audible wheeze, cough, or visible cyanosis.  No visible retractions or increased work of breathing.    MS: No gross musculoskeletal defects noted.  Normal range of motion.  No visible edema.  SKIN: Visible skin clear. No significant rash, abnormal pigmentation or lesions.  NEURO: Cranial nerves grossly intact.  Mentation and speech appropriate for age.  PSYCH: Mentation appears normal, affect normal/bright, judgement and insight intact, normal speech and appearance  well-groomed.      Endocrine Labs:  Lab Results   Component Value Date    A1C 6.8 07/03/2020    A1C 8.4 03/13/2017    A1C 7.6 02/18/2016    A1C 7.8 07/07/2015    A1C 6.9 12/03/2013     TSH   Date Value Ref Range Status   06/18/2021 2.39 0.40 - 4.00 mU/L Final             Assessment and Plan:   Franklin Muir is a very pleasant and well articulated 51 years old male with a 11 years history of type 1 diabetes and positive thyroid antibodies.      1. Type 1 diabetes mellitus without complication (H)  Blood glucose levels remain well controlled.  We have discussed strategies which may help to further improve his blood glucose control.  Franklin will plan to split his carb intake prior to biking so that he has some carbs before he started biking and he has additional carbohydrates 30 to 45 minutes routine his bike ride.  He will also plan to his NovoLog 15 minutes prior to starting his lunch. Patient is agreeable with this plan.      2.  Chronic complications of diabetes  Retinopathy:  None.  Had dermatology appointment in January 2021.  We will send a copy of the report via [address]    Nephropathy:  BP well controlled. No microalbuminuria.  Creatinine stable.   Neuropathy: No.      3.  Cardiovascular risk factors  Lipids:  LDL at target.  Patient taking statin  Blood pressure: Patient's blood pressure has been normal in the past.  Most recent recorded value was 102/52 mmHg.  Not on blood pressure medications     4.  Positive TPO antibodies  TSH was normal June 2021. No further work up needed today.  We will check TSH once a year or sooner if the patient develops symptoms suggestive of hypo- or hyperthyroidism.    5.  COVID-19 pandemic  The patient will continue to follow the CDC recommendations on prevention and mitigation of COVID-19.      6. Health maintenance: Patient is on vitamin D replacement therapy.  Vitamin D levels are normal in June 2021.    Return to clinic with Liz Conroy in 3 months and with me in 6  months.  He will do labs prior to his next visit      No orders of the defined types were placed in this encounter.    Patient Instructions   -Continue Tresiba 9 units subcutaneous in the morning  -Continue NovoLog 1 unit per 20 g of carbohydrates with meals and snacks  -Please plan to take your NovoLog 15 minutes prior to your meals  -Continue NovoLog 0.5 units per 30 mg/dL above 150 mg/dL during the day and over 200 mg/dL at night.  -Please consider splitting your carbohydrates prior to biking so that you consume some at the beginning of the right into the remaining 30 to 45 minutes later   -Our clinic staff will reach out to schedule a follow-up appointment with myself or with Liz Conroy in 3 months   -Please continue to follow the CDC indications for prevention and mitigation of COVID-19.   -Congratulations again excellent care of your diabetes.    It was a pleasure to see you today.  Please feel free to contact our clinic if you have any questions or concerns.  Sincerely,    Freya Wilson MD PhD    Division of Endocrinology and Diabetes         Due to the COVID 19 pandemic this visit was a video visit. The patient gave verbal consent for the visit today.  I have independently reviewed and interpreted labs, imaging as indicated.        Video Start time: 9: 30AM  Video Stop time: 10:00 AM    46 minutes spent on the date of the encounter doing chart review, history and exam, documentation and further activities as noted above    Freya Wilson MD PhD    Division of Endocrinology and Diabetes        Amos is a 51 year old who is being evaluated via a billable video visit.      How would you like to obtain your AVS?   If the video visit is dropped, the invitation should be resent by:   Will anyone else be joining your video visit?     Outcome for 09/21/21 8:51 AM :Left Voicemail for patient to call back     Yesenia Cano MA

## 2021-09-21 NOTE — PROGRESS NOTES
Amos is a 51 year old who is being evaluated via a billable video visit.      How would you like to obtain your AVS?   If the video visit is dropped, the invitation should be resent by:   Will anyone else be joining your video visit?     Outcome for 09/21/21 8:51 AM :Left Voicemail for patient to call back     Yesenia Cano MA

## 2021-09-29 NOTE — PATIENT INSTRUCTIONS
-Continue Tresiba 9 units subcutaneous in the morning  -Continue NovoLog 1 unit per 20 g of carbohydrates with meals and snacks  -Please plan to take your NovoLog 15 minutes prior to your meals  -Continue NovoLog 0.5 units per 30 mg/dL above 150 mg/dL during the day and over 200 mg/dL at night.  -Please consider splitting your carbohydrates prior to biking so that you consume some at the beginning of the right into the remaining 30 to 45 minutes later   -Our clinic staff will reach out to schedule a follow-up appointment with myself or with Liz Conroy in 3 months   -Please continue to follow the CDC indications for prevention and mitigation of COVID-19.   -Congratulations again excellent care of your diabetes.    It was a pleasure to see you today.  Please feel free to contact our clinic if you have any questions or concerns.  Sincerely,    Freya Wilson MD PhD    Division of Endocrinology and Diabetes

## 2021-10-12 ENCOUNTER — MYC MEDICAL ADVICE (OUTPATIENT)
Dept: ENDOCRINOLOGY | Facility: CLINIC | Age: 51
End: 2021-10-12

## 2021-10-12 DIAGNOSIS — E10.65 TYPE 1 DIABETES MELLITUS WITH HYPERGLYCEMIA (H): ICD-10-CM

## 2021-10-12 RX ORDER — INSULIN DEGLUDEC 100 U/ML
INJECTION, SOLUTION SUBCUTANEOUS
Qty: 15 ML | Refills: 3 | Status: SHIPPED | OUTPATIENT
Start: 2021-10-12 | End: 2021-10-12

## 2021-10-12 RX ORDER — INSULIN DEGLUDEC 100 U/ML
INJECTION, SOLUTION SUBCUTANEOUS
Qty: 15 ML | Refills: 1 | Status: SHIPPED | OUTPATIENT
Start: 2021-10-12 | End: 2022-07-01

## 2021-10-12 NOTE — TELEPHONE ENCOUNTER
Detail Level: Zone
JOSUE FLEXTOUCH 100UNIT/ML SOPN      Last Written Prescription Date:  8/25/20  Last Fill Quantity: 9 ml,   # refills: 3  Last Office Visit : 9/21/21  Future Office visit:  1/17/22    Routing refill request to provider for review/approval because:  Insulin - refilled per clinic      
Detail Level: Detailed

## 2022-01-03 ENCOUNTER — MYC MEDICAL ADVICE (OUTPATIENT)
Dept: ENDOCRINOLOGY | Facility: CLINIC | Age: 52
End: 2022-01-03
Payer: COMMERCIAL

## 2022-01-03 DIAGNOSIS — E10.8 TYPE 1 DIABETES MELLITUS WITH COMPLICATION (H): ICD-10-CM

## 2022-01-03 RX ORDER — INSULIN ASPART 100 [IU]/ML
INJECTION, SOLUTION INTRAVENOUS; SUBCUTANEOUS
Qty: 45 ML | Refills: 3 | Status: SHIPPED | OUTPATIENT
Start: 2022-01-03 | End: 2024-01-30

## 2022-01-03 NOTE — TELEPHONE ENCOUNTER
RE      Hi Dr. Wilson,     Happy New Year! It looks like I need my prescription for NovoLOG PENFILL 100 UNIT/ML cartridge to be renewed. Would you be so kind as to send that to the [place] [facility] in [place]?     Thanks so much.  Amos Muir      Clinic visit notes:   Novolog 1 U /20 g of CHO with meals and snacks and premeals correction with 0.5 U per 30 mg/dL above 150 mg/dL during the day and over 200 mg/dL HS.

## 2022-01-09 ENCOUNTER — HEALTH MAINTENANCE LETTER (OUTPATIENT)
Age: 52
End: 2022-01-09

## 2022-01-14 NOTE — PROGRESS NOTES
dm 1  Sylvia Zamora [facility]    Outcome for 22 12:40 PM :Patient is sharing data via clinic device website and patient has been instructed to update data on website. Find login information by using [employer] dexcom      HPI:   Franklin is a 50 yo man here for follow up of type 1 diabetes, diagnosed in his 30's.  He also sees Dr. Wilson.  He reports that he has been doing well.  He has no concerns today. He is currently taking Tresiba 9 units daily at 7 AM and Novolog 1 unit/20 grams of CHO with meals and snacks, and for pre-meal correction @ 1/2 U per 30 mg/dL above 150 during the day and over 200 at HS. He has been physically active, doing a lot of running, primarily before dinner.  He has been largely avoiding hypoglycemia and finds the dexcom helpful at preventing this.   He has had his COVID booster.  He has been trying to take his Novolog 15 minutes ahead of his meals. He does more snacking at night and feels his glucose sometimes climbs.     He has been staying healthy. Debating whether to fly to [place] to visit his parents.     Franklin wears a Dexcom G6 sensor with overall average of 154 mg/dL (CV 32%) for the past two weeks. Recent glucose is as follows:             Amos really likes having a sensor and feels it helps him manage his diabetes better.  He has never worn a pump.  He does have strong symptoms alerting him to hypoglycemia.  His brother also had type 1 diabetes and  of DKA.    Franklin also has a history of Hashimoto's ab positivity with normal function, has never been on levothyroxine replacement therapy.  He has no palpitations. He generally has been feeling well and has no concerns today.     ROS   GENERAL: few pound weight loss- maintaining. No fevers, chills, night sweats.      HEENT: no dysphagia, diplopia, neck pain or tenderness, dry/scratchy eyes, URI, cough, sinus drainage, tinnitus, sinus pressure  CV: no chest pain, pressure, palpitations, skipped beats, LOC  LUNGS: no SOB, FOUNTAIN,  cough, sputum production, wheezing   GI: no diarrhea, constipation, abdominal pain  EXTREMITIES: no rashes, ulcers, edema  NEUROLOGY: no changes in vision, tingling or numbness in hands or feet.   MSK: no muscle aches or pains, weakness  PSYCH: mood stable    Past Medical History:   Diagnosis Date     Chronic lymphocytic thyroiditis 7/20/2011     Chronic lymphocytic thyroiditis 7/20/2011     Chronic lymphocytic thyroiditis      Depressive disorder 06/01/1997    Treated for a couple of years; not currently experiencing     Diabetes 6/23/2009       Past Surgical History:   Procedure Laterality Date     HC TOOTH EXTRACTION W/FORCEP       VASECTOMY  01/2019       Family History   Problem Relation Age of Onset     Hyperlipidemia Mother      Diabetes Brother      Hypertension Paternal Grandfather      Cerebrovascular Disease Paternal Grandfather      Hypertension Maternal Grandfather      Hyperlipidemia Maternal Grandfather      Cerebrovascular Disease Maternal Grandfather      Breast Cancer Maternal Aunt      Diabetes Brother      Anxiety Disorder Brother      Coronary Artery Disease Other         Maternal great grandfather     Hypertension Maternal Grandmother      Hyperlipidemia Maternal Grandmother      Hyperlipidemia Brother      Cerebrovascular Disease Paternal Grandmother      Asthma No family hx of      C.A.D. No family hx of      Cancer - colorectal No family hx of      Prostate Cancer No family hx of        Social History     Social History     Marital status:      Spouse name: N/A     Number of children: N/A     Years of education: N/A     Social History Main Topics     Smoking status: Never Smoker     Smokeless tobacco: Never Used     Alcohol use Yes      Comment: ~ 1 drink/day     Drug use: No     Sexual activity: Yes     Partners: Female     Birth control/ protection: Condom, Natural Family Planning      Comment: Would like to talk about vasectomy     Other Topics Concern     Parent/Sibling W/ Cabg,  Mi Or Angioplasty Before 65f 55m? No     Social History Narrative   Social Hx:   .  Has a son born in 2011.  Works a  at the [employer]. Now . Physically active- running and playing with his son.    Current Outpatient Medications   Medication     ACE/ARB NOT PRESCRIBED, INTENTIONAL,     acetone urine (KETOSTIX) test strip     atorvastatin (LIPITOR) 10 MG tablet     blood glucose (NO BRAND SPECIFIED) test strip     blood glucose (NO BRAND SPECIFIED) test strip     blood glucose monitoring (NO BRAND SPECIFIED) meter device kit     blood glucose monitoring (NO BRAND SPECIFIED) meter device kit     Continuous Blood Gluc Sensor (DEXCOM G6 SENSOR) MISC     Continuous Blood Gluc Transmit (DEXCOM G6 TRANSMITTER) MISC     glucagon (GLUCAGON EMERGENCY) 1 MG kit     Glucagon 3 MG/DOSE POWD     insulin degludec (TRESIBA FLEXTOUCH) 100 UNIT/ML pen     insulin pen needle (PENTIPS) 31G X 5 MM miscellaneous     ketoconazole (NIZORAL) 2 % external shampoo     loratadine (CLARITIN) 10 MG tablet     NOVOLOG PENFILL 100 UNIT/ML soln     PRECISION XTRA KETONE STRP     Sodium Fluoride (CLINPRO 5000) 1.1 % PSTE     VITAMIN D, CHOLECALCIFEROL, PO     No current facility-administered medications for this visit.          Allergies   Allergen Reactions     Ceftin Rash     Seasonal Allergies      Ampicillin Rash     Physical Exam  There were no vitals taken for this visit.  GENERAL:  Alert and oriented X3, NAD, well dressed, answering questions appropriately, appears stated age.  HEENT: OP clear, no lymphadenopathy, no thyromegaly, non-tender, no exophthalmus, no proptosis, EOMI, no lid lag, no retraction  EXTREMITIES: no edema, +pulses, no rashes, no lesions  NEUROLOGY: CN grossly intact, + monofilament, + DTR upper and lower extremity, +vibratory sensation.      RESULTS  Lab Results   Component Value Date    A1C 6.4 (H) 06/18/2021    A1C 6.8 (H) 07/03/2020    A1C 8.4 (H) 03/13/2017    A1C  7.6 (H) 02/18/2016    A1C 7.8 (H) 07/07/2015    HEMOGLOBINA1 6.9 (A) 03/10/2020    HEMOGLOBINA1 6.8 (A) 09/10/2019    HEMOGLOBINA1 6.6 (A) 06/03/2019    HEMOGLOBINA1 7.0 (A) 03/12/2019    HEMOGLOBINA1 7.9 (A) 11/13/2018       TSH   Date Value Ref Range Status   06/18/2021 2.39 0.40 - 4.00 mU/L Final   07/03/2020 2.72 0.40 - 4.00 mU/L Final   07/03/2020 2.72 0.40 - 4.00 mU/L Final   05/24/2019 1.89 0.40 - 4.00 mU/L Final   04/18/2018 2.20 0.40 - 4.00 mU/L Final     T4 Total   Date Value Ref Range Status   09/28/2010 9.0 5.0 - 11.0 ug/dL Final     T4 Free   Date Value Ref Range Status   02/18/2016 1.06 0.76 - 1.46 ng/dL Final   07/07/2015 1.04 0.76 - 1.46 ng/dL Final   01/05/2015 1.02 0.76 - 1.46 ng/dL Final     Comment:     Effective 7/30/2014, the reference range for this assay has changed to reflect   new instrumentation/methodology.     12/03/2013 1.08 0.70 - 1.85 ng/dL Final   01/26/2011 1.10 0.70 - 1.85 ng/dL Final       ALT   Date Value Ref Range Status   05/24/2019 25 0 - 70 U/L Final   03/13/2017 24 0 - 70 U/L Final   ]    Recent Labs   Lab Test 06/18/21  0952 07/03/20  0831 02/18/16  0837 01/05/15  0933 12/03/13  0907   CHOL 121 136   < > 134 163   HDL 51 55   < > 47 43   LDL 63 71   < > 76 108   TRIG 35 51   < > 57 57   CHOLHDLRATIO  --   --   --  2.9 3.8    < > = values in this interval not displayed.       Lab Results   Component Value Date     03/13/2017      Lab Results   Component Value Date    POTASSIUM 3.9 06/18/2021     Lab Results   Component Value Date    CHLORIDE 102 03/13/2017     Lab Results   Component Value Date    SHANIKA 9.1 03/13/2017     Lab Results   Component Value Date    CO2 31 03/13/2017     Lab Results   Component Value Date    BUN 15 03/13/2017     Lab Results   Component Value Date    CR 0.85 06/18/2021       GFR Estimate   Date Value Ref Range Status   06/18/2021 >90 >60 mL/min/[1.73_m2] Final     Comment:     Non  GFR Calc  Starting 12/18/2018, serum creatinine  "based estimated GFR (eGFR) will be   calculated using the Chronic Kidney Disease Epidemiology Collaboration   (CKD-EPI) equation.     07/03/2020 >90 >60 mL/min/[1.73_m2] Final     Comment:     Non  GFR Calc  Starting 12/18/2018, serum creatinine based estimated GFR (eGFR) will be   calculated using the Chronic Kidney Disease Epidemiology Collaboration   (CKD-EPI) equation.     05/24/2019 >90 >60 mL/min/[1.73_m2] Final     Comment:     Non  GFR Calc  Starting 12/18/2018, serum creatinine based estimated GFR (eGFR) will be   calculated using the Chronic Kidney Disease Epidemiology Collaboration   (CKD-EPI) equation.       GFR Estimate If Black   Date Value Ref Range Status   06/18/2021 >90 >60 mL/min/[1.73_m2] Final     Comment:      GFR Calc  Starting 12/18/2018, serum creatinine based estimated GFR (eGFR) will be   calculated using the Chronic Kidney Disease Epidemiology Collaboration   (CKD-EPI) equation.     07/03/2020 >90 >60 mL/min/[1.73_m2] Final     Comment:      GFR Calc  Starting 12/18/2018, serum creatinine based estimated GFR (eGFR) will be   calculated using the Chronic Kidney Disease Epidemiology Collaboration   (CKD-EPI) equation.     05/24/2019 >90 >60 mL/min/[1.73_m2] Final     Comment:      GFR Calc  Starting 12/18/2018, serum creatinine based estimated GFR (eGFR) will be   calculated using the Chronic Kidney Disease Epidemiology Collaboration   (CKD-EPI) equation.         Lab Results   Component Value Date    MICROL <5 06/18/2021     No results found for: MICROALBUMIN  Lab Results   Component Value Date    CPEPT 2.3 11/19/2009    GADAB >250.0 (H) 11/19/2009    ISCAB  09/08/2009     1:16  Reference range: <1:4  (Note)  TEST INFORMATION: Islet Cell Ab, IgG  Islet cell antibodies have been associated with  \"autoimmune\" endocrine disorders and insulin-dependent  diabetes. This disorder is characterized by the presence  of " antibodies in patients that may be detected years  before the onset of the clinical symptoms. To calculate  Juvenile Diabetes Foundation (JDF) units: multiply the  titer x 5 (1:8  8 x 5 = 40 JDF Units).  Performed by [facility],  [address] [place] [id number] [phone]  [email or web address], Evelia Miles MD, Lab. Director       No results found for: B12]    Most recent eye exam date: : Not Found     Eye exam in January, 2021- no retinopathy.  Scheduled for appointment in the next couple of weeks.     Assessment/Plan:     1.  Type 1 diabetes- Franklin's glucose control is excellent. A1c is 6.4%.  His variability is low with very little hypoglycemia.  He does have a consistent glucose spike after breakfast despite increasing his bolus and pre-bolusing 15 minutes ahead of his meals.  Could consider switching to Fiasp or Lyumjev insulin, which could help control post-prandial hyperglycemia.  Overall, his control is excellent, so we made no changes today.      2.  Risk factors-     Retinopathy:  No.  Had eye exam in January, 2021.    Nephropathy:  BP historically well controlled. No microalbuminuria.  Creatinine stable.   Neuropathy: No.    Feet: OK, no ulcers.   Lipids:  LDL at target.  Patient taking statin  Celiac screening: negative 2018    3.  Positive TPO antibodies-TSH normal on no therapy.      4. F/U in 3 months with me, in 6 months with Dr. Wilson.     35 minutes spent on the date of the encounter doing chart review, review of test results, review of continuous glucose sensor, interpretation of glucose data, patient visit and documentation, counseling/coordination of care, and discussion of follow up plan for worsening hyper and hypoglycemia.  The patient understood and is satisfied with today's visit.      Liz Conroy PA-C, MPAS   [facility]  Department of Medicine  Division of Endocrinology and Diabetes                  Answers for HPI/ROS submitted by the patient on  1/16/2022  General Symptoms: No  Skin Symptoms: No  HENT Symptoms: No  EYE SYMPTOMS: No  HEART SYMPTOMS: No  LUNG SYMPTOMS: No  INTESTINAL SYMPTOMS: No  URINARY SYMPTOMS: No  REPRODUCTIVE SYMPTOMS: No  SKELETAL SYMPTOMS: No  BLOOD SYMPTOMS: No  NERVOUS SYSTEM SYMPTOMS: No  MENTAL HEALTH SYMPTOMS: No

## 2022-01-17 ENCOUNTER — OFFICE VISIT (OUTPATIENT)
Dept: ENDOCRINOLOGY | Facility: CLINIC | Age: 52
End: 2022-01-17
Payer: COMMERCIAL

## 2022-01-17 VITALS
HEART RATE: 62 BPM | DIASTOLIC BLOOD PRESSURE: 74 MMHG | BODY MASS INDEX: 22.89 KG/M2 | SYSTOLIC BLOOD PRESSURE: 118 MMHG | WEIGHT: 169 LBS | HEIGHT: 72 IN

## 2022-01-17 DIAGNOSIS — E10.9 TYPE 1 DIABETES MELLITUS WITHOUT COMPLICATION (H): Primary | ICD-10-CM

## 2022-01-17 LAB — HBA1C MFR BLD: 6.4 % (ref 4.3–?)

## 2022-01-17 PROCEDURE — 83036 HEMOGLOBIN GLYCOSYLATED A1C: CPT | Performed by: PHYSICIAN ASSISTANT

## 2022-01-17 PROCEDURE — 99214 OFFICE O/P EST MOD 30 MIN: CPT | Performed by: PHYSICIAN ASSISTANT

## 2022-01-17 ASSESSMENT — PAIN SCALES - GENERAL: PAINLEVEL: NO PAIN (0)

## 2022-01-17 ASSESSMENT — MIFFLIN-ST. JEOR: SCORE: 1661.64

## 2022-01-17 NOTE — LETTER
2022       RE: Franklin Muir  [address]  [place] [address]     Dear Colleague,    Thank you for referring your patient, Franklin Muir, to the [facility] [place] at [facility]. Please see a copy of my visit note below.    dm 1  Sylvia Zamora CMA    Outcome for 22 12:40 PM :Patient is sharing data via clinic device website and patient has been instructed to update data on website. Find login information by using .[employer] Via dexcom      HPI:   Franklin is a 52 yo man here for follow up of type 1 diabetes, diagnosed in his 30's.  He also sees Dr. Wilson.  He reports that he has been doing well.  He has no concerns today. He is currently taking Tresiba 9 units daily at 7 AM and Novolog 1 unit/20 grams of CHO with meals and snacks, and for pre-meal correction @ 1/2 U per 30 mg/dL above 150 during the day and over 200 at HS. He has been physically active, doing a lot of running, primarily before dinner.  He has been largely avoiding hypoglycemia and finds the dexcom helpful at preventing this.   He has had his COVID booster.  He has been trying to take his Novolog 15 minutes ahead of his meals. He does more snacking at night and feels his glucose sometimes climbs.     He has been staying healthy. Debating whether to fly to [place] to visit his parents.     Franklin wears a Dexcom G6 sensor with overall average of 154 mg/dL (CV 32%) for the past two weeks. Recent glucose is as follows:             Amos really likes having a sensor and feels it helps him manage his diabetes better.  He has never worn a pump.  He does have strong symptoms alerting him to hypoglycemia.  His brother also had type 1 diabetes and  of DKA.    Franklin also has a history of Hashimoto's ab positivity with normal function, has never been on levothyroxine replacement therapy.  He has no palpitations. He generally has been feeling well and has no  concerns today.     ROS   GENERAL: few pound weight loss- maintaining. No fevers, chills, night sweats.      HEENT: no dysphagia, diplopia, neck pain or tenderness, dry/scratchy eyes, URI, cough, sinus drainage, tinnitus, sinus pressure  CV: no chest pain, pressure, palpitations, skipped beats, LOC  LUNGS: no SOB, FOUNTAIN, cough, sputum production, wheezing   GI: no diarrhea, constipation, abdominal pain  EXTREMITIES: no rashes, ulcers, edema  NEUROLOGY: no changes in vision, tingling or numbness in hands or feet.   MSK: no muscle aches or pains, weakness  PSYCH: mood stable    Past Medical History:   Diagnosis Date     Chronic lymphocytic thyroiditis 7/20/2011     Chronic lymphocytic thyroiditis 7/20/2011     Chronic lymphocytic thyroiditis      Depressive disorder 06/01/1997    Treated for a couple of years; not currently experiencing     Diabetes 6/23/2009       Past Surgical History:   Procedure Laterality Date     HC TOOTH EXTRACTION W/FORCEP       VASECTOMY  01/2019       Family History   Problem Relation Age of Onset     Hyperlipidemia Mother      Diabetes Brother      Hypertension Paternal Grandfather      Cerebrovascular Disease Paternal Grandfather      Hypertension Maternal Grandfather      Hyperlipidemia Maternal Grandfather      Cerebrovascular Disease Maternal Grandfather      Breast Cancer Maternal Aunt      Diabetes Brother      Anxiety Disorder Brother      Coronary Artery Disease Other         Maternal great grandfather     Hypertension Maternal Grandmother      Hyperlipidemia Maternal Grandmother      Hyperlipidemia Brother      Cerebrovascular Disease Paternal Grandmother      Asthma No family hx of      C.A.D. No family hx of      Cancer - colorectal No family hx of      Prostate Cancer No family hx of        Social History     Social History     Marital status:      Spouse name: N/A     Number of children: N/A     Years of education: N/A     Social History Main Topics     Smoking status:  Never Smoker     Smokeless tobacco: Never Used     Alcohol use Yes      Comment: ~ 1 drink/day     Drug use: No     Sexual activity: Yes     Partners: Female     Birth control/ protection: Condom, Natural Family Planning      Comment: Would like to talk about vasectomy     Other Topics Concern     Parent/Sibling W/ Cabg, Mi Or Angioplasty Before 65f 55m? No     Social History Narrative   Social Hx:   .  Has a son born in 2011.  Works a  at the [employer]. Now . Physically active- running and playing with his son.    Current Outpatient Medications   Medication     ACE/ARB NOT PRESCRIBED, INTENTIONAL,     acetone urine (KETOSTIX) test strip     atorvastatin (LIPITOR) 10 MG tablet     blood glucose (NO BRAND SPECIFIED) test strip     blood glucose (NO BRAND SPECIFIED) test strip     blood glucose monitoring (NO BRAND SPECIFIED) meter device kit     blood glucose monitoring (NO BRAND SPECIFIED) meter device kit     Continuous Blood Gluc Sensor (DEXCOM G6 SENSOR) MISC     Continuous Blood Gluc Transmit (DEXCOM G6 TRANSMITTER) MISC     glucagon (GLUCAGON EMERGENCY) 1 MG kit     Glucagon 3 MG/DOSE POWD     insulin degludec (TRESIBA FLEXTOUCH) 100 UNIT/ML pen     insulin pen needle (PENTIPS) 31G X 5 MM miscellaneous     ketoconazole (NIZORAL) 2 % external shampoo     loratadine (CLARITIN) 10 MG tablet     NOVOLOG PENFILL 100 UNIT/ML soln     PRECISION XTRA KETONE STRP     Sodium Fluoride (CLINPRO 5000) 1.1 % PSTE     VITAMIN D, CHOLECALCIFEROL, PO     No current facility-administered medications for this visit.          Allergies   Allergen Reactions     Ceftin Rash     Seasonal Allergies      Ampicillin Rash     Physical Exam  There were no vitals taken for this visit.  GENERAL:  Alert and oriented X3, NAD, well dressed, answering questions appropriately, appears stated age.  HEENT: OP clear, no lymphadenopathy, no thyromegaly, non-tender, no exophthalmus, no proptosis,  EOMI, no lid lag, no retraction  EXTREMITIES: no edema, +pulses, no rashes, no lesions  NEUROLOGY: CN grossly intact, + monofilament, + DTR upper and lower extremity, +vibratory sensation.      RESULTS  Lab Results   Component Value Date    A1C 6.4 (H) 06/18/2021    A1C 6.8 (H) 07/03/2020    A1C 8.4 (H) 03/13/2017    A1C 7.6 (H) 02/18/2016    A1C 7.8 (H) 07/07/2015    HEMOGLOBINA1 6.9 (A) 03/10/2020    HEMOGLOBINA1 6.8 (A) 09/10/2019    HEMOGLOBINA1 6.6 (A) 06/03/2019    HEMOGLOBINA1 7.0 (A) 03/12/2019    HEMOGLOBINA1 7.9 (A) 11/13/2018       TSH   Date Value Ref Range Status   06/18/2021 2.39 0.40 - 4.00 mU/L Final   07/03/2020 2.72 0.40 - 4.00 mU/L Final   07/03/2020 2.72 0.40 - 4.00 mU/L Final   05/24/2019 1.89 0.40 - 4.00 mU/L Final   04/18/2018 2.20 0.40 - 4.00 mU/L Final     T4 Total   Date Value Ref Range Status   09/28/2010 9.0 5.0 - 11.0 ug/dL Final     T4 Free   Date Value Ref Range Status   02/18/2016 1.06 0.76 - 1.46 ng/dL Final   07/07/2015 1.04 0.76 - 1.46 ng/dL Final   01/05/2015 1.02 0.76 - 1.46 ng/dL Final     Comment:     Effective 7/30/2014, the reference range for this assay has changed to reflect   new instrumentation/methodology.     12/03/2013 1.08 0.70 - 1.85 ng/dL Final   01/26/2011 1.10 0.70 - 1.85 ng/dL Final       ALT   Date Value Ref Range Status   05/24/2019 25 0 - 70 U/L Final   03/13/2017 24 0 - 70 U/L Final   ]    Recent Labs   Lab Test 06/18/21  0952 07/03/20  0831 02/18/16  0837 01/05/15  0933 12/03/13  0907   CHOL 121 136   < > 134 163   HDL 51 55   < > 47 43   LDL 63 71   < > 76 108   TRIG 35 51   < > 57 57   CHOLHDLRATIO  --   --   --  2.9 3.8    < > = values in this interval not displayed.       Lab Results   Component Value Date     03/13/2017      Lab Results   Component Value Date    POTASSIUM 3.9 06/18/2021     Lab Results   Component Value Date    CHLORIDE 102 03/13/2017     Lab Results   Component Value Date    SHANIKA 9.1 03/13/2017     Lab Results   Component Value  Date    CO2 31 03/13/2017     Lab Results   Component Value Date    BUN 15 03/13/2017     Lab Results   Component Value Date    CR 0.85 06/18/2021       GFR Estimate   Date Value Ref Range Status   06/18/2021 >90 >60 mL/min/[1.73_m2] Final     Comment:     Non  GFR Calc  Starting 12/18/2018, serum creatinine based estimated GFR (eGFR) will be   calculated using the Chronic Kidney Disease Epidemiology Collaboration   (CKD-EPI) equation.     07/03/2020 >90 >60 mL/min/[1.73_m2] Final     Comment:     Non  GFR Calc  Starting 12/18/2018, serum creatinine based estimated GFR (eGFR) will be   calculated using the Chronic Kidney Disease Epidemiology Collaboration   (CKD-EPI) equation.     05/24/2019 >90 >60 mL/min/[1.73_m2] Final     Comment:     Non  GFR Calc  Starting 12/18/2018, serum creatinine based estimated GFR (eGFR) will be   calculated using the Chronic Kidney Disease Epidemiology Collaboration   (CKD-EPI) equation.       GFR Estimate If Black   Date Value Ref Range Status   06/18/2021 >90 >60 mL/min/[1.73_m2] Final     Comment:      GFR Calc  Starting 12/18/2018, serum creatinine based estimated GFR (eGFR) will be   calculated using the Chronic Kidney Disease Epidemiology Collaboration   (CKD-EPI) equation.     07/03/2020 >90 >60 mL/min/[1.73_m2] Final     Comment:      GFR Calc  Starting 12/18/2018, serum creatinine based estimated GFR (eGFR) will be   calculated using the Chronic Kidney Disease Epidemiology Collaboration   (CKD-EPI) equation.     05/24/2019 >90 >60 mL/min/[1.73_m2] Final     Comment:      GFR Calc  Starting 12/18/2018, serum creatinine based estimated GFR (eGFR) will be   calculated using the Chronic Kidney Disease Epidemiology Collaboration   (CKD-EPI) equation.         Lab Results   Component Value Date    MICROL <5 06/18/2021     No results found for: MICROALBUMIN  Lab Results   Component Value  "Date    CPEPT 2.3 11/19/2009    GADAB >250.0 (H) 11/19/2009    ISCAB  09/08/2009     1:16  Reference range: <1:4  (Note)  TEST INFORMATION: Islet Cell Ab, IgG  Islet cell antibodies have been associated with  \"autoimmune\" endocrine disorders and insulin-dependent  diabetes. This disorder is characterized by the presence  of antibodies in patients that may be detected years  before the onset of the clinical symptoms. To calculate  Juvenile Diabetes Foundation (JDF) units: multiply the  titer x 5 (1:8  8 x 5 = 40 JDF Units).  Performed by [facility],  [address] [place] [id number] [phone]  [email or web address], Evelia Miles MD, Lab. Director       No results found for: B12]    Most recent eye exam date: : Not Found     Eye exam in January, 2021- no retinopathy.  Scheduled for appointment in the next couple of weeks.     Assessment/Plan:     1.  Type 1 diabetes- Franklin's glucose control is excellent. A1c is 6.4%.  His variability is low with very little hypoglycemia.  He does have a consistent glucose spike after breakfast despite increasing his bolus and pre-bolusing 15 minutes ahead of his meals.  Could consider switching to Fiasp or Lyumjev insulin, which could help control post-prandial hyperglycemia.  Overall, his control is excellent, so we made no changes today.      2.  Risk factors-     Retinopathy:  No.  Had eye exam in January, 2021.    Nephropathy:  BP historically well controlled. No microalbuminuria.  Creatinine stable.   Neuropathy: No.    Feet: OK, no ulcers.   Lipids:  LDL at target.  Patient taking statin  Celiac screening: negative 2018    3.  Positive TPO antibodies-TSH normal on no therapy.      4. F/U in 3 months with me, in 6 months with Dr. Wilson.     35 minutes spent on the date of the encounter doing chart review, review of test results, review of continuous glucose sensor, interpretation of glucose data, patient visit and documentation, counseling/coordination of care, and " discussion of follow up plan for worsening hyper and hypoglycemia.  The patient understood and is satisfied with today's visit.      Liz Conroy PA-C, MPAS   [facility]  Department of Medicine  Division of Endocrinology and Diabetes                  Answers for HPI/ROS submitted by the patient on 1/16/2022  General Symptoms: No  Skin Symptoms: No  HENT Symptoms: No  EYE SYMPTOMS: No  HEART SYMPTOMS: No  LUNG SYMPTOMS: No  INTESTINAL SYMPTOMS: No  URINARY SYMPTOMS: No  REPRODUCTIVE SYMPTOMS: No  SKELETAL SYMPTOMS: No  BLOOD SYMPTOMS: No  NERVOUS SYSTEM SYMPTOMS: No  MENTAL HEALTH SYMPTOMS: No

## 2022-01-17 NOTE — PATIENT INSTRUCTIONS
No changes.      Consider fiasp or lyumjev insulin (designed to be taken at the start of meal) if post-meal highs continue.    Keep up the great work!    A1c 6.4%!           We appreciate your assistance in coordinating your healthcare.     Please upload your insulin pump, blood sugar meter and/or continuous glucose monitor at home 1-2 days before your next diabetes-related appointment.   This will allow your provider to review your  data before your scheduled virtual visit.    To ask a question to your Endocrine care team, please send them a [employer] message, or reach them by phone at [phone]     To expedite your medication refill(s), please contact your pharmacy and have them   fax a refill request to: [phone].  *Please allow 3 business days for routine medication refills.  *Please allow 5 business days for controlled substance medication refills.    For after-hours urgent Endocrine issues, that do not require 911, please dial [phone], and ask to speak with the Endocrinologist On-Call

## 2022-02-02 DIAGNOSIS — E10.9 WELL CONTROLLED TYPE 1 DIABETES MELLITUS (H): ICD-10-CM

## 2022-02-02 RX ORDER — PROCHLORPERAZINE 25 MG/1
1 SUPPOSITORY RECTAL
Qty: 1 EACH | Refills: 3 | Status: SHIPPED | OUTPATIENT
Start: 2022-02-02 | End: 2023-03-09

## 2022-02-02 RX ORDER — PROCHLORPERAZINE 25 MG/1
SUPPOSITORY RECTAL
Qty: 3 EACH | Refills: 3 | Status: SHIPPED | OUTPATIENT
Start: 2022-02-02 | End: 2022-05-13

## 2022-02-03 RX ORDER — PROCHLORPERAZINE 25 MG/1
SUPPOSITORY RECTAL
Qty: 1 EACH | Refills: 3 | OUTPATIENT
Start: 2022-02-03

## 2022-03-21 ENCOUNTER — MYC MEDICAL ADVICE (OUTPATIENT)
Dept: ENDOCRINOLOGY | Facility: CLINIC | Age: 52
End: 2022-03-21
Payer: COMMERCIAL

## 2022-03-21 DIAGNOSIS — E10.9 WELL CONTROLLED TYPE 1 DIABETES MELLITUS (H): Primary | ICD-10-CM

## 2022-03-21 RX ORDER — LANCETS
EACH MISCELLANEOUS
Qty: 102 EACH | Refills: 6 | Status: SHIPPED | OUTPATIENT
Start: 2022-03-21

## 2022-03-21 RX ORDER — LANCING DEVICE/LANCETS
KIT MISCELLANEOUS
Qty: 1 EACH | Refills: 0 | Status: SHIPPED | OUTPATIENT
Start: 2022-03-21 | End: 2024-01-30

## 2022-03-21 RX ORDER — GLUCOSAMINE HCL/CHONDROITIN SU 500-400 MG
CAPSULE ORAL
Qty: 100 EACH | Refills: 6 | Status: SHIPPED | OUTPATIENT
Start: 2022-03-21

## 2022-03-21 NOTE — TELEPHONE ENCOUNTER
Accu-Chek FastClix.device and lancets     No longer making lancets for current lancing device (Accu-Chek MultiClix)    Last Office Visit : 1/17/22  Future Office visit:  4/25/22    Routing refill request to provider for review/approval because:  Drug not active on patient's medication list    NEW ORDER

## 2022-04-20 NOTE — PROGRESS NOTES
"Outcome for 22 3:46 PM: Data uploaded on Dexcom  Hailee Alcala, DANIELLE     dm 1  Sylvia Zamora CMA    Outcome for 22 12:40 PM :Patient is sharing data via clinic device website and patient has been instructed to update data on website. Find login information by using [employer] dexcom  Start time: 953  End time: 1023      HPI:   Franklin is a 52 yo man here for follow up of type 1 diabetes, diagnosed in his 30's.  He also sees Dr. Wilson.  He reports that he has been doing well.  He is currently taking Tresiba 9 units daily at 7 AM and Novolog 1 unit/20 grams of CHO with meals and snacks, and for pre-meal correction @ 1/2 U per 30 mg/dL above 150 during the day and over 200 at HS. He has been physically active, doing a lot of running, primarily before dinner.  He has been largely avoiding hypoglycemia and finds the dexcom helpful at preventing this.  Amos really likes having a sensor and feels it helps him manage his diabetes better.  He has never worn a pump.  He does have strong symptoms alerting him to hypoglycemia.  His brother also had type 1 diabetes and  of DKA.    Franklin wears a Dexcom G6 sensor with overall average of 158 mg/dL (CV 27%) for the past two weeks. Recent glucose is as follows:           70g snack around midnight.     Protein bar for breakfast, \"normal\" lunch, dinner, and sizeable snack (70g) around 1am. He has noticed his glucose climbs around 3-4 in the morning.      Runs about 3 days a week. He continues running 5-6 miles at a time.   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 He has noticed more achy joints, ankles, plantar fasciitis.  One weekend day and depends on the weather.     Franklin also has a history of Hashimoto's ab positivity with normal function, has never been on levothyroxine replacement therapy.  He has no palpitations. He generally has been feeling well and has no concerns today.     ROS   GENERAL: few pound weight loss- maintaining. No fevers, chills, night " sweats.      HEENT: no dysphagia, diplopia, neck pain or tenderness, dry/scratchy eyes, URI, cough, sinus drainage, tinnitus, sinus pressure  CV: no chest pain, pressure, palpitations, skipped beats, LOC  LUNGS: no SOB, FOUNTAIN, cough, sputum production, wheezing   GI: no diarrhea, constipation, abdominal pain  EXTREMITIES: +ankle joint pain, plantar fasciitis. No rashes, ulcers, edema  NEUROLOGY: no changes in vision, tingling or numbness in hands or feet.   MSK: no muscle aches or pains, weakness  PSYCH: mood stable    Past Medical History:   Diagnosis Date     Chronic lymphocytic thyroiditis 7/20/2011     Chronic lymphocytic thyroiditis 7/20/2011     Chronic lymphocytic thyroiditis      Depressive disorder 06/01/1997    Treated for a couple of years; not currently experiencing     Diabetes 6/23/2009       Past Surgical History:   Procedure Laterality Date     HC TOOTH EXTRACTION W/FORCEP       VASECTOMY  01/2019       Family History   Problem Relation Age of Onset     Hyperlipidemia Mother      Diabetes Brother      Hypertension Paternal Grandfather      Cerebrovascular Disease Paternal Grandfather      Hypertension Maternal Grandfather      Hyperlipidemia Maternal Grandfather      Cerebrovascular Disease Maternal Grandfather      Breast Cancer Maternal Aunt      Diabetes Brother      Anxiety Disorder Brother      Coronary Artery Disease Other         Maternal great grandfather     Hypertension Maternal Grandmother      Hyperlipidemia Maternal Grandmother      Hyperlipidemia Brother      Cerebrovascular Disease Paternal Grandmother      Asthma No family hx of      C.A.D. No family hx of      Cancer - colorectal No family hx of      Prostate Cancer No family hx of        Social History     Social History     Marital status:      Spouse name: N/A     Number of children: N/A     Years of education: N/A     Social History Main Topics     Smoking status: Never Smoker     Smokeless tobacco: Never Used     Alcohol  use Yes      Comment: ~ 1 drink/day     Drug use: No     Sexual activity: Yes     Partners: Female     Birth control/ protection: Condom, Natural Family Planning      Comment: Would like to talk about vasectomy     Other Topics Concern     Parent/Sibling W/ Cabg, Mi Or Angioplasty Before 65f 55m? No     Social History Narrative   Social Hx:   .  Has a son born in 2011.   at the [employer]. Now . Physically active- running and playing with his son.    Current Outpatient Medications   Medication     ACE/ARB NOT PRESCRIBED, INTENTIONAL,     acetone urine (KETOSTIX) test strip     alcohol swab prep pads     atorvastatin (LIPITOR) 10 MG tablet     blood glucose (ACCU-CHEK FASTCLIX) lancing device     blood glucose (NO BRAND SPECIFIED) test strip     blood glucose (NO BRAND SPECIFIED) test strip     blood glucose monitoring (ACCU-CHEK FASTCLIX) lancets     blood glucose monitoring (NO BRAND SPECIFIED) meter device kit     blood glucose monitoring (NO BRAND SPECIFIED) meter device kit     Continuous Blood Gluc Sensor (DEXCOM G6 SENSOR) MISC     Continuous Blood Gluc Transmit (DEXCOM G6 TRANSMITTER) MISC     glucagon (GLUCAGON EMERGENCY) 1 MG kit     Glucagon 3 MG/DOSE POWD     insulin degludec (TRESIBA FLEXTOUCH) 100 UNIT/ML pen     insulin pen needle (PENTIPS) 31G X 5 MM miscellaneous     ketoconazole (NIZORAL) 2 % external shampoo     loratadine (CLARITIN) 10 MG tablet     NOVOLOG PENFILL 100 UNIT/ML soln     PRECISION XTRA KETONE STRP     Sodium Fluoride (CLINPRO 5000) 1.1 % PSTE     VITAMIN D, CHOLECALCIFEROL, PO     No current facility-administered medications for this visit.          Allergies   Allergen Reactions     Ceftin Rash     Seasonal Allergies      Ampicillin Rash     Physical Exam  GENERAL: healthy, alert and no distress  RESP: no audible wheeze, cough, or visible cyanosis.  No visible retractions or increased work of breathing.  Able to speak fully in  complete sentences.  PSYCH: mentation appears normal, affect normal/bright, judgement and insight intact, normal speech and appearance well-groomed    RESULTS  Lab Results   Component Value Date    A1C 6.4 (H) 06/18/2021    A1C 6.8 (H) 07/03/2020    A1C 8.4 (H) 03/13/2017    A1C 7.6 (H) 02/18/2016    A1C 7.8 (H) 07/07/2015    HEMOGLOBINA1 6.4 01/17/2022    HEMOGLOBINA1 6.9 (A) 03/10/2020    HEMOGLOBINA1 6.8 (A) 09/10/2019    HEMOGLOBINA1 6.6 (A) 06/03/2019    HEMOGLOBINA1 7.0 (A) 03/12/2019       TSH   Date Value Ref Range Status   06/18/2021 2.39 0.40 - 4.00 mU/L Final   07/03/2020 2.72 0.40 - 4.00 mU/L Final   07/03/2020 2.72 0.40 - 4.00 mU/L Final   05/24/2019 1.89 0.40 - 4.00 mU/L Final   04/18/2018 2.20 0.40 - 4.00 mU/L Final     T4 Total   Date Value Ref Range Status   09/28/2010 9.0 5.0 - 11.0 ug/dL Final     T4 Free   Date Value Ref Range Status   02/18/2016 1.06 0.76 - 1.46 ng/dL Final   07/07/2015 1.04 0.76 - 1.46 ng/dL Final   01/05/2015 1.02 0.76 - 1.46 ng/dL Final     Comment:     Effective 7/30/2014, the reference range for this assay has changed to reflect   new instrumentation/methodology.     12/03/2013 1.08 0.70 - 1.85 ng/dL Final   01/26/2011 1.10 0.70 - 1.85 ng/dL Final       ALT   Date Value Ref Range Status   05/24/2019 25 0 - 70 U/L Final   03/13/2017 24 0 - 70 U/L Final   ]    Recent Labs   Lab Test 06/18/21  0952 07/03/20  0831 02/18/16  0837 01/05/15  0933   CHOL 121 136   < > 134   HDL 51 55   < > 47   LDL 63 71   < > 76   TRIG 35 51   < > 57   CHOLHDLRATIO  --   --   --  2.9    < > = values in this interval not displayed.       Lab Results   Component Value Date     03/13/2017      Lab Results   Component Value Date    POTASSIUM 3.9 06/18/2021     Lab Results   Component Value Date    CHLORIDE 102 03/13/2017     Lab Results   Component Value Date    SHANIKA 9.1 03/13/2017     Lab Results   Component Value Date    CO2 31 03/13/2017     Lab Results   Component Value Date    BUN 15  03/13/2017     Lab Results   Component Value Date    CR 0.85 06/18/2021       GFR Estimate   Date Value Ref Range Status   06/18/2021 >90 >60 mL/min/[1.73_m2] Final     Comment:     Non  GFR Calc  Starting 12/18/2018, serum creatinine based estimated GFR (eGFR) will be   calculated using the Chronic Kidney Disease Epidemiology Collaboration   (CKD-EPI) equation.     07/03/2020 >90 >60 mL/min/[1.73_m2] Final     Comment:     Non  GFR Calc  Starting 12/18/2018, serum creatinine based estimated GFR (eGFR) will be   calculated using the Chronic Kidney Disease Epidemiology Collaboration   (CKD-EPI) equation.     05/24/2019 >90 >60 mL/min/[1.73_m2] Final     Comment:     Non  GFR Calc  Starting 12/18/2018, serum creatinine based estimated GFR (eGFR) will be   calculated using the Chronic Kidney Disease Epidemiology Collaboration   (CKD-EPI) equation.       GFR Estimate If Black   Date Value Ref Range Status   06/18/2021 >90 >60 mL/min/[1.73_m2] Final     Comment:      GFR Calc  Starting 12/18/2018, serum creatinine based estimated GFR (eGFR) will be   calculated using the Chronic Kidney Disease Epidemiology Collaboration   (CKD-EPI) equation.     07/03/2020 >90 >60 mL/min/[1.73_m2] Final     Comment:      GFR Calc  Starting 12/18/2018, serum creatinine based estimated GFR (eGFR) will be   calculated using the Chronic Kidney Disease Epidemiology Collaboration   (CKD-EPI) equation.     05/24/2019 >90 >60 mL/min/[1.73_m2] Final     Comment:      GFR Calc  Starting 12/18/2018, serum creatinine based estimated GFR (eGFR) will be   calculated using the Chronic Kidney Disease Epidemiology Collaboration   (CKD-EPI) equation.         Lab Results   Component Value Date    MICROL <5 06/18/2021     No results found for: MICROALBUMIN  Lab Results   Component Value Date    CPEPT 2.3 11/19/2009    GADAB >250.0 (H) 11/19/2009    ISCAB   "09/08/2009     1:16  Reference range: <1:4  (Note)  TEST INFORMATION: Islet Cell Ab, IgG  Islet cell antibodies have been associated with  \"autoimmune\" endocrine disorders and insulin-dependent  diabetes. This disorder is characterized by the presence  of antibodies in patients that may be detected years  before the onset of the clinical symptoms. To calculate  Juvenile Diabetes Foundation (JDF) units: multiply the  titer x 5 (1:8  8 x 5 = 40 JDF Units).  Performed by [facility],  [address] [place] [id number] [phone]  [email or web address], Evelia Miles MD, Lab. Director       No results found for: B12]    Most recent eye exam date: : Not Found     Eye exam in January, 2021- no retinopathy.  Scheduled for appointment in the next couple of weeks.     Assessment/Plan:     1.  Type 1 diabetes- Franklin's glucose control is excellent. Last a1c was 6.4%.  His variability is low with very little hypoglycemia.  He does have a consistent glucose rise after bedtime snack, so I suggested changing his carb ratio to 1/18g a that time. Could consider switching to Fiasp or Lyumjev insulin, which could help control post-prandial hyperglycemia.  We have also discussed the In-pen in the past, but he feels he is doing fine on his current treatment plan. No other changes today.      2.  Risk factors-     Retinopathy:  No.  Had eye exam in January, 2022.    Nephropathy:  BP historically well controlled. No microalbuminuria.  Creatinine stable.   Neuropathy: No.    Feet: OK, no ulcers.   Lipids:  LDL at target.  Patient taking statin  Celiac screening: negative 2018    3.  Positive TPO antibodies-TSH normal on no therapy.  Will check TSH.    4. F/U in 3 months with me, in 6 months to establish with new endocrinologist, as Dr. Wilson will be leaving.    44 minutes spent on the date of the encounter doing chart review, review of test results, review of continuous glucose sensor, interpretation of glucose data, patient visit " and documentation, counseling/coordination of care, and discussion of follow up plan for worsening hyper and hypoglycemia.  The patient understood and is satisfied with today's visit.      Liz Conroy PA-C, MPAS   [facility]  Department of Medicine  Division of Endocrinology and Diabetes        Answers for HPI/ROS submitted by the patient on 4/25/2022  General Symptoms: No  Skin Symptoms: No  HENT Symptoms: No  EYE SYMPTOMS: No  HEART SYMPTOMS: No  LUNG SYMPTOMS: No  INTESTINAL SYMPTOMS: No  URINARY SYMPTOMS: No  REPRODUCTIVE SYMPTOMS: No  SKELETAL SYMPTOMS: Yes  BLOOD SYMPTOMS: No  NERVOUS SYSTEM SYMPTOMS: No  MENTAL HEALTH SYMPTOMS: No  Back pain: No  Muscle aches: No  Neck pain: No  Swollen joints: No  Joint pain: Yes  Bone pain: No  Muscle cramps: No  Muscle weakness: No  Joint stiffness: Yes  Bone fracture: No

## 2022-04-25 ENCOUNTER — VIRTUAL VISIT (OUTPATIENT)
Dept: ENDOCRINOLOGY | Facility: CLINIC | Age: 52
End: 2022-04-25
Payer: COMMERCIAL

## 2022-04-25 DIAGNOSIS — E10.9 WELL CONTROLLED TYPE 1 DIABETES MELLITUS (H): Primary | ICD-10-CM

## 2022-04-25 PROCEDURE — 99215 OFFICE O/P EST HI 40 MIN: CPT | Mod: 95 | Performed by: PHYSICIAN ASSISTANT

## 2022-04-25 ASSESSMENT — ENCOUNTER SYMPTOMS
MUSCLE CRAMPS: 0
MUSCLE WEAKNESS: 0
JOINT SWELLING: 0
STIFFNESS: 1
BACK PAIN: 0
ARTHRALGIAS: 1
NECK PAIN: 0
MYALGIAS: 0

## 2022-04-25 NOTE — LETTER
"2022       RE: Franklin Muir  [address]  [place] [address]     Dear Colleague,    Thank you for referring your patient, Franklin Muir, to the [facility] ENDOCRINOLOGY [facility] [place] at [facility]. Please see a copy of my visit note below.    Outcome for 22 3:46 PM: Data uploaded on Dexcom  DANIELLE Anderson     dm 1  Sylvia Zamora CMA    Outcome for 22 12:40 PM :Patient is sharing data via clinic device website and patient has been instructed to update data on website. Find login information by using .[email or web address] Via dexcom  Start time: 953  End time:       HPI:   Franklin is a 52 yo man here for follow up of type 1 diabetes, diagnosed in his 30's.  He also sees Dr. Wilson.  He reports that he has been doing well.  He is currently taking Tresiba 9 units daily at 7 AM and Novolog 1 unit/20 grams of CHO with meals and snacks, and for pre-meal correction @ 1/2 U per 30 mg/dL above 150 during the day and over 200 at HS. He has been physically active, doing a lot of running, primarily before dinner.  He has been largely avoiding hypoglycemia and finds the dexcom helpful at preventing this.  Amos really likes having a sensor and feels it helps him manage his diabetes better.  He has never worn a pump.  He does have strong symptoms alerting him to hypoglycemia.  His brother also had type 1 diabetes and  of DKA.    Franklin wears a Dexcom G6 sensor with overall average of 158 mg/dL (CV 27%) for the past two weeks. Recent glucose is as follows:           70g snack around midnight.     Protein bar for breakfast, \"normal\" lunch, dinner, and sizeable snack (70g) around 1am. He has noticed his glucose climbs around 3-4 in the morning.      Runs about 3 days a week. He continues running 5-6 miles at a time.   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 He has noticed more achy joints, ankles, plantar fasciitis.  One weekend day and depends " on the weather.     Franklin also has a history of Hashimoto's ab positivity with normal function, has never been on levothyroxine replacement therapy.  He has no palpitations. He generally has been feeling well and has no concerns today.     ROS   GENERAL: few pound weight loss- maintaining. No fevers, chills, night sweats.      HEENT: no dysphagia, diplopia, neck pain or tenderness, dry/scratchy eyes, URI, cough, sinus drainage, tinnitus, sinus pressure  CV: no chest pain, pressure, palpitations, skipped beats, LOC  LUNGS: no SOB, FOUNTAIN, cough, sputum production, wheezing   GI: no diarrhea, constipation, abdominal pain  EXTREMITIES: +ankle joint pain, plantar fasciitis. No rashes, ulcers, edema  NEUROLOGY: no changes in vision, tingling or numbness in hands or feet.   MSK: no muscle aches or pains, weakness  PSYCH: mood stable    Past Medical History:   Diagnosis Date     Chronic lymphocytic thyroiditis 7/20/2011     Chronic lymphocytic thyroiditis 7/20/2011     Chronic lymphocytic thyroiditis      Depressive disorder 06/01/1997    Treated for a couple of years; not currently experiencing     Diabetes 6/23/2009       Past Surgical History:   Procedure Laterality Date     HC TOOTH EXTRACTION W/FORCEP       VASECTOMY  01/2019       Family History   Problem Relation Age of Onset     Hyperlipidemia Mother      Diabetes Brother      Hypertension Paternal Grandfather      Cerebrovascular Disease Paternal Grandfather      Hypertension Maternal Grandfather      Hyperlipidemia Maternal Grandfather      Cerebrovascular Disease Maternal Grandfather      Breast Cancer Maternal Aunt      Diabetes Brother      Anxiety Disorder Brother      Coronary Artery Disease Other         Maternal great grandfather     Hypertension Maternal Grandmother      Hyperlipidemia Maternal Grandmother      Hyperlipidemia Brother      Cerebrovascular Disease Paternal Grandmother      Asthma No family hx of      C.A.D. No family hx of      Cancer -  colorectal No family hx of      Prostate Cancer No family hx of        Social History     Social History     Marital status:      Spouse name: N/A     Number of children: N/A     Years of education: N/A     Social History Main Topics     Smoking status: Never Smoker     Smokeless tobacco: Never Used     Alcohol use Yes      Comment: ~ 1 drink/day     Drug use: No     Sexual activity: Yes     Partners: Female     Birth control/ protection: Condom, Natural Family Planning      Comment: Would like to talk about vasectomy     Other Topics Concern     Parent/Sibling W/ Cabg, Mi Or Angioplasty Before 65f 55m? No     Social History Narrative   Social Hx:   .  Has a son born in 2011.   at the [employer]. Now . Physically active- running and playing with his son.    Current Outpatient Medications   Medication     ACE/ARB NOT PRESCRIBED, INTENTIONAL,     acetone urine (KETOSTIX) test strip     alcohol swab prep pads     atorvastatin (LIPITOR) 10 MG tablet     blood glucose (ACCU-CHEK FASTCLIX) lancing device     blood glucose (NO BRAND SPECIFIED) test strip     blood glucose (NO BRAND SPECIFIED) test strip     blood glucose monitoring (ACCU-CHEK FASTCLIX) lancets     blood glucose monitoring (NO BRAND SPECIFIED) meter device kit     blood glucose monitoring (NO BRAND SPECIFIED) meter device kit     Continuous Blood Gluc Sensor (DEXCOM G6 SENSOR) MISC     Continuous Blood Gluc Transmit (DEXCOM G6 TRANSMITTER) MISC     glucagon (GLUCAGON EMERGENCY) 1 MG kit     Glucagon 3 MG/DOSE POWD     insulin degludec (TRESIBA FLEXTOUCH) 100 UNIT/ML pen     insulin pen needle (PENTIPS) 31G X 5 MM miscellaneous     ketoconazole (NIZORAL) 2 % external shampoo     loratadine (CLARITIN) 10 MG tablet     NOVOLOG PENFILL 100 UNIT/ML soln     PRECISION XTRA KETONE STRP     Sodium Fluoride (CLINPRO 5000) 1.1 % PSTE     VITAMIN D, CHOLECALCIFEROL, PO     No current facility-administered  medications for this visit.          Allergies   Allergen Reactions     Ceftin Rash     Seasonal Allergies      Ampicillin Rash     Physical Exam  GENERAL: healthy, alert and no distress  RESP: no audible wheeze, cough, or visible cyanosis.  No visible retractions or increased work of breathing.  Able to speak fully in complete sentences.  PSYCH: mentation appears normal, affect normal/bright, judgement and insight intact, normal speech and appearance well-groomed    RESULTS  Lab Results   Component Value Date    A1C 6.4 (H) 06/18/2021    A1C 6.8 (H) 07/03/2020    A1C 8.4 (H) 03/13/2017    A1C 7.6 (H) 02/18/2016    A1C 7.8 (H) 07/07/2015    HEMOGLOBINA1 6.4 01/17/2022    HEMOGLOBINA1 6.9 (A) 03/10/2020    HEMOGLOBINA1 6.8 (A) 09/10/2019    HEMOGLOBINA1 6.6 (A) 06/03/2019    HEMOGLOBINA1 7.0 (A) 03/12/2019       TSH   Date Value Ref Range Status   06/18/2021 2.39 0.40 - 4.00 mU/L Final   07/03/2020 2.72 0.40 - 4.00 mU/L Final   07/03/2020 2.72 0.40 - 4.00 mU/L Final   05/24/2019 1.89 0.40 - 4.00 mU/L Final   04/18/2018 2.20 0.40 - 4.00 mU/L Final     T4 Total   Date Value Ref Range Status   09/28/2010 9.0 5.0 - 11.0 ug/dL Final     T4 Free   Date Value Ref Range Status   02/18/2016 1.06 0.76 - 1.46 ng/dL Final   07/07/2015 1.04 0.76 - 1.46 ng/dL Final   01/05/2015 1.02 0.76 - 1.46 ng/dL Final     Comment:     Effective 7/30/2014, the reference range for this assay has changed to reflect   new instrumentation/methodology.     12/03/2013 1.08 0.70 - 1.85 ng/dL Final   01/26/2011 1.10 0.70 - 1.85 ng/dL Final       ALT   Date Value Ref Range Status   05/24/2019 25 0 - 70 U/L Final   03/13/2017 24 0 - 70 U/L Final   ]    Recent Labs   Lab Test 06/18/21  0952 07/03/20  0831 02/18/16  0837 01/05/15  0933   CHOL 121 136   < > 134   HDL 51 55   < > 47   LDL 63 71   < > 76   TRIG 35 51   < > 57   CHOLHDLRATIO  --   --   --  2.9    < > = values in this interval not displayed.       Lab Results   Component Value Date      03/13/2017      Lab Results   Component Value Date    POTASSIUM 3.9 06/18/2021     Lab Results   Component Value Date    CHLORIDE 102 03/13/2017     Lab Results   Component Value Date    SHANIKA 9.1 03/13/2017     Lab Results   Component Value Date    CO2 31 03/13/2017     Lab Results   Component Value Date    BUN 15 03/13/2017     Lab Results   Component Value Date    CR 0.85 06/18/2021       GFR Estimate   Date Value Ref Range Status   06/18/2021 >90 >60 mL/min/[1.73_m2] Final     Comment:     Non  GFR Calc  Starting 12/18/2018, serum creatinine based estimated GFR (eGFR) will be   calculated using the Chronic Kidney Disease Epidemiology Collaboration   (CKD-EPI) equation.     07/03/2020 >90 >60 mL/min/[1.73_m2] Final     Comment:     Non  GFR Calc  Starting 12/18/2018, serum creatinine based estimated GFR (eGFR) will be   calculated using the Chronic Kidney Disease Epidemiology Collaboration   (CKD-EPI) equation.     05/24/2019 >90 >60 mL/min/[1.73_m2] Final     Comment:     Non  GFR Calc  Starting 12/18/2018, serum creatinine based estimated GFR (eGFR) will be   calculated using the Chronic Kidney Disease Epidemiology Collaboration   (CKD-EPI) equation.       GFR Estimate If Black   Date Value Ref Range Status   06/18/2021 >90 >60 mL/min/[1.73_m2] Final     Comment:      GFR Calc  Starting 12/18/2018, serum creatinine based estimated GFR (eGFR) will be   calculated using the Chronic Kidney Disease Epidemiology Collaboration   (CKD-EPI) equation.     07/03/2020 >90 >60 mL/min/[1.73_m2] Final     Comment:      GFR Calc  Starting 12/18/2018, serum creatinine based estimated GFR (eGFR) will be   calculated using the Chronic Kidney Disease Epidemiology Collaboration   (CKD-EPI) equation.     05/24/2019 >90 >60 mL/min/[1.73_m2] Final     Comment:      GFR Calc  Starting 12/18/2018, serum creatinine based estimated GFR (eGFR)  "will be   calculated using the Chronic Kidney Disease Epidemiology Collaboration   (CKD-EPI) equation.         Lab Results   Component Value Date    MICROL <5 06/18/2021     No results found for: MICROALBUMIN  Lab Results   Component Value Date    CPEPT 2.3 11/19/2009    GADAB >250.0 (H) 11/19/2009    ISCAB  09/08/2009     1:16  Reference range: <1:4  (Note)  TEST INFORMATION: Islet Cell Ab, IgG  Islet cell antibodies have been associated with  \"autoimmune\" endocrine disorders and insulin-dependent  diabetes. This disorder is characterized by the presence  of antibodies in patients that may be detected years  before the onset of the clinical symptoms. To calculate  Juvenile Diabetes Foundation (JDF) units: multiply the  titer x 5 (1:8  8 x 5 = 40 JDF Units).  Performed by [facility],  [address] [place] [id number] [phone]  [email or web address], Evelia Miles MD, Lab. Director       No results found for: B12]    Most recent eye exam date: : Not Found     Eye exam in January, 2021- no retinopathy.  Scheduled for appointment in the next couple of weeks.     Assessment/Plan:     1.  Type 1 diabetes- Franklin's glucose control is excellent. Last a1c was 6.4%.  His variability is low with very little hypoglycemia.  He does have a consistent glucose rise after bedtime snack, so I suggested changing his carb ratio to 1/18g a that time. Could consider switching to Fiasp or Lyumjev insulin, which could help control post-prandial hyperglycemia.  We have also discussed the In-pen in the past, but he feels he is doing fine on his current treatment plan. No other changes today.      2.  Risk factors-     Retinopathy:  No.  Had eye exam in January, 2022.    Nephropathy:  BP historically well controlled. No microalbuminuria.  Creatinine stable.   Neuropathy: No.    Feet: OK, no ulcers.   Lipids:  LDL at target.  Patient taking statin  Celiac screening: negative 2018    3.  Positive TPO antibodies-TSH normal on no " therapy.  Will check TSH.    4. F/U in 3 months with me, in 6 months to establish with new endocrinologist, as Dr. Wilson will be leaving.    44 minutes spent on the date of the encounter doing chart review, review of test results, review of continuous glucose sensor, interpretation of glucose data, patient visit and documentation, counseling/coordination of care, and discussion of follow up plan for worsening hyper and hypoglycemia.  The patient understood and is satisfied with today's visit.      Liz Conroy PA-C, MPAS   [facility]  Department of Medicine  Division of Endocrinology and Diabetes        Answers for HPI/ROS submitted by the patient on 4/25/2022  General Symptoms: No  Skin Symptoms: No  HENT Symptoms: No  EYE SYMPTOMS: No  HEART SYMPTOMS: No  LUNG SYMPTOMS: No  INTESTINAL SYMPTOMS: No  URINARY SYMPTOMS: No  REPRODUCTIVE SYMPTOMS: No  SKELETAL SYMPTOMS: Yes  BLOOD SYMPTOMS: No  NERVOUS SYSTEM SYMPTOMS: No  MENTAL HEALTH SYMPTOMS: No  Back pain: No  Muscle aches: No  Neck pain: No  Swollen joints: No  Joint pain: Yes  Bone pain: No  Muscle cramps: No  Muscle weakness: No  Joint stiffness: Yes  Bone fracture: No        Franklin Muir  is being evaluated via a billable video visit.      How would you like to obtain your AVS? MyChart  For the video visit, send the invitation by: Text to cell phone: [phone]  Will anyone else be joining your video visit? No

## 2022-04-25 NOTE — PATIENT INSTRUCTIONS
Try 1/18g with bedtime snack to try to lower AM glucose.      Schedule in 3 months with me, and in 6 months with staff endocrinologist to establish care-  Dr. Paola Jay or Dr. Neto Donahue.        Please let me know if you are having low blood sugars less than 70 or over 250 mg/dL.      If you have concerns, please send me a Picturae message M-Th, call the clinic at [phone], or call [phone] after hours/weekends and ask to speak with the endocrinologist on call.

## 2022-04-25 NOTE — NURSING NOTE
Marked meds as reviewed as Patient denies any changes since echeck-in regarding medication and allergies

## 2022-04-25 NOTE — PROGRESS NOTES
Franklin Muir  is being evaluated via a billable video visit.      How would you like to obtain your AVS? Clinkle  For the video visit, send the invitation by: Text to cell phone: [phone]  Will anyone else be joining your video visit? No

## 2022-05-12 ENCOUNTER — VIRTUAL VISIT (OUTPATIENT)
Dept: FAMILY MEDICINE | Facility: CLINIC | Age: 52
End: 2022-05-12
Payer: COMMERCIAL

## 2022-05-12 DIAGNOSIS — U07.1 INFECTION DUE TO 2019 NOVEL CORONAVIRUS: Primary | ICD-10-CM

## 2022-05-12 DIAGNOSIS — E10.9 WELL CONTROLLED TYPE 1 DIABETES MELLITUS (H): ICD-10-CM

## 2022-05-12 PROCEDURE — 99213 OFFICE O/P EST LOW 20 MIN: CPT | Mod: 95 | Performed by: PHYSICIAN ASSISTANT

## 2022-05-12 NOTE — PROGRESS NOTES
Amos is a 51 year old who is being evaluated via a billable video visit.      How would you like to obtain your AVS? MyChart  If the video visit is dropped, the invitation should be resent by: Text to cell phone: in chart  Will anyone else be joining your video visit? No     Video Start Time: 10:54 AM    Assessment & Plan   Problem List Items Addressed This Visit    None     Visit Diagnoses     Infection due to 2019 novel coronavirus    -  Primary    Relevant Medications    nirmatrelvir and ritonavir (PAXLOVID) therapy pack         Impression is COVID-19. Positive home test. 3rd day of symptoms. Vaccinated and boosted. Appears well and non-toxic and I have low suspicion for impending airway obstruction or respiratory distress.  He will push p.o. fluids, use over-the-counter meds for symptoms, complete a course of Paxlovid after discussing risks/benefits and follow-up with us in 2 weeks if not improving or urgent care/the ER if symptoms worsen/change at any time.     DDx and Dx discussed with and explained to the pt to their satisfaction.  All questions were answered at this time. Pt expressed understanding of and agreement with this dx, tx, and plan. No further workup warranted and standard medication warnings given. I have given the patient a list of pertinent indications for re-evaluation. Will go to the Emergency Department if symptoms worsen or new concerning symptoms arise. Patient left the call in no apparent distress.      23 minutes spent on the date of the encounter doing chart review, history and exam, documentation and further activities per the note     See Patient Instructions  COVID-19 positive patient.  Encounter for consideration of medication intervention. Patient does qualify for a prescription. Full discussion with patient including medication options, risks and benefits. Potential drug interactions reviewed with patient.     Treatment Planned Paxlovid, Rx sent to [facility]  [facility] [phone]  [address], [address]  [place] [address]    Hours:  Mon-Fri: 7:00a - 7:00p    Drive-thru services available.  Temporary change to home medications: hold atorvastatin for 8 days.    Estimated body mass index is 23.24 kg/m  as calculated from the following:    Height as of 1/17/22: 1.816 m (5' 11.5\").    Weight as of 1/17/22: 76.7 kg (169 lb).  GFR Estimate   Date Value Ref Range Status   06/18/2021 >90 >60 mL/min/[1.73_m2] Final     Comment:     Non  GFR Calc  Starting 12/18/2018, serum creatinine based estimated GFR (eGFR) will be   calculated using the Chronic Kidney Disease Epidemiology Collaboration   (CKD-EPI) equation.       No results found for: [id number]    Return in about 2 weeks (around 5/26/2022) for a recheck of your symptoms if not improving, or call 911/go to an ER anytime if worsening.    DORI Jacobo  [facility] NORBERTO Trinidad is a 51 year old who presents for the following health issues     HPI     COVID-19 Symptom Review  How many days ago did these symptoms start? 5/10/22    Are any of the following symptoms significant for you?    New or worsening difficulty breathing? No    Worsening cough? Yes, I am coughing up mucus.    Fever or chills? Yes, the highest temperature was 99.7F    Headache: mild    Sore throat: YES    Chest pain: no    Diarrhea: no    Body aches? no    What treatments has patient tried? Acetaminophen, Nonsteroidals and Cough syrup   Does patient live in a nursing home, group home, or shelter? no  Does patient have a way to get food/medications during quarantined? Yes, I have a friend or family member who can help me.    Review of Systems   Constitutional, HEENT, cardiovascular, pulmonary, gi and gu systems are negative, except as otherwise noted.      Objective           Vitals:  No vitals were obtained today due to virtual visit.    Physical Exam   GENERAL: Healthy, alert and no " distress  EYES: Eyes grossly normal to inspection.  No discharge or erythema, or obvious scleral/conjunctival abnormalities.  RESP: No audible wheeze, cough, or visible cyanosis.  No visible retractions or increased work of breathing.    SKIN: Visible skin clear. No significant rash, abnormal pigmentation or lesions.  NEURO: Cranial nerves grossly intact.  Mentation and speech appropriate for age.  PSYCH: Mentation appears normal, affect normal/bright, judgement and insight intact, normal speech and appearance well-groomed.     Video-Visit Details    Type of service:  Video Visit    Video End Time:11:15 AM    Originating Location (pt. Location): Home    Distant Location (provider location):  [facility]     Platform used for Video Visit: Vapps

## 2022-05-12 NOTE — PATIENT INSTRUCTIONS
Amos-  Thank you for allowing [facility] to manage your care.    PLEASE DISCONTINUE YOUR ATORVASTATIN FOR THE NEXT 8 DAYS.    If you develop worsening/changing symptoms at any time such as shortness of breath, chest pain, jaundice, changes in color of stool/urine, etc., please call 911 or go to the emergency department for evaluation.     I sent your prescriptions to your pharmacy.     For your pain, please use Ibuprofen 400mg four times daily with food. Between ibuprofen doses, you may use Tylenol 650mg.      Max acetaminophen (Tylenol) 3,000mg/24 hours  Max ibuprofen 2,000mg/24 hours     Drink 8-10 glasses of fluid daily to stay well-hydrated.     If you have any questions or concerns, please feel free to call us at [phone]     Sincerely,     Joey Bullock PA-C     Did you know?       You can schedule a video visit for follow-up appointments as well as future appointments for certain conditions.  Please see the below link.      [email or web address]     If you have not already done so,  I encourage you to sign up for [facility] ([email or web address]).  This will allow you to review your results, securely communicate

## 2022-05-13 RX ORDER — PROCHLORPERAZINE 25 MG/1
SUPPOSITORY RECTAL
Qty: 9 EACH | Refills: 3 | Status: SHIPPED | OUTPATIENT
Start: 2022-05-13 | End: 2023-06-07

## 2022-05-13 NOTE — TELEPHONE ENCOUNTER
Continuous Blood Gluc Sensor (DEXCOM G6 SENSOR) MISC [Pharmacy Med Name: DEXCOM G6 SENSOR  [facility]    4/25/2022  [facility] [place]   Liz Conroy PA-C    Endocrinology, Diabetes, and Metabolism

## 2022-05-31 ASSESSMENT — ENCOUNTER SYMPTOMS
SORE THROAT: 0
ABDOMINAL PAIN: 0
HEMATURIA: 0
JOINT SWELLING: 1
DIARRHEA: 0
HEARTBURN: 0
WEAKNESS: 0
CONSTIPATION: 0
PALPITATIONS: 0
PARESTHESIAS: 0
DYSURIA: 0
NERVOUS/ANXIOUS: 0
HEMATOCHEZIA: 0
ARTHRALGIAS: 1
NAUSEA: 0
CHILLS: 0
FREQUENCY: 0
COUGH: 0
MYALGIAS: 0
EYE PAIN: 0
HEADACHES: 0
FEVER: 0
DIZZINESS: 0
SHORTNESS OF BREATH: 0

## 2022-06-02 ENCOUNTER — OFFICE VISIT (OUTPATIENT)
Dept: FAMILY MEDICINE | Facility: CLINIC | Age: 52
End: 2022-06-02
Payer: COMMERCIAL

## 2022-06-02 VITALS
RESPIRATION RATE: 18 BRPM | HEIGHT: 71 IN | WEIGHT: 161.6 LBS | OXYGEN SATURATION: 98 % | HEART RATE: 74 BPM | SYSTOLIC BLOOD PRESSURE: 102 MMHG | BODY MASS INDEX: 22.62 KG/M2 | DIASTOLIC BLOOD PRESSURE: 76 MMHG | TEMPERATURE: 97.8 F

## 2022-06-02 DIAGNOSIS — M72.2 PLANTAR FASCIITIS: ICD-10-CM

## 2022-06-02 DIAGNOSIS — E10.9 TYPE 1 DIABETES MELLITUS WITHOUT COMPLICATION (H): ICD-10-CM

## 2022-06-02 DIAGNOSIS — Z00.00 PHYSICAL EXAM, ROUTINE: Primary | ICD-10-CM

## 2022-06-02 DIAGNOSIS — Z12.11 SCREEN FOR COLON CANCER: ICD-10-CM

## 2022-06-02 PROCEDURE — 99396 PREV VISIT EST AGE 40-64: CPT | Performed by: FAMILY MEDICINE

## 2022-06-02 RX ORDER — COVID-19 ANTIGEN TEST
220 KIT MISCELLANEOUS 2 TIMES DAILY WITH MEALS
COMMUNITY

## 2022-06-02 ASSESSMENT — ENCOUNTER SYMPTOMS
HEADACHES: 0
FEVER: 0
DIZZINESS: 0
PARESTHESIAS: 0
FREQUENCY: 0
ABDOMINAL PAIN: 0
HEMATURIA: 0
COUGH: 0
HEARTBURN: 0
WEAKNESS: 0
DIARRHEA: 0
NERVOUS/ANXIOUS: 0
HEMATOCHEZIA: 0
MYALGIAS: 0
PALPITATIONS: 0
DYSURIA: 0
NAUSEA: 0
EYE PAIN: 0
CONSTIPATION: 0
SHORTNESS OF BREATH: 0
JOINT SWELLING: 1
SORE THROAT: 0
ARTHRALGIAS: 1
CHILLS: 0

## 2022-06-02 ASSESSMENT — PAIN SCALES - GENERAL: PAINLEVEL: NO PAIN (0)

## 2022-06-02 NOTE — PROGRESS NOTES
SUBJECTIVE:   CC: Franklin Muir is an 51 year old male who presents for preventative health visit.     Patient has been advised of split billing requirements and indicates understanding: Yes  Healthy Habits:     Getting at least 3 servings of Calcium per day:  NO    Bi-annual eye exam:  Yes    Dental care twice a year:  Yes    Sleep apnea or symptoms of sleep apnea:  None    Diet:  Regular (no restrictions)    Frequency of exercise:  4-5 days/week    Duration of exercise:  45-60 minutes    Taking medications regularly:  Yes    Medication side effects:  None    PHQ-2 Total Score: 1    Additional concerns today:  No    Aches and pains discuss   Has plantar fascitis  Discussed basic measures, which he is already doing    Also type 1 diabetes, well controlled  Reviewed follow up with endocrinology    Today's PHQ-2 Score:   PHQ-2 ( 1999 [employer]) 5/31/2022   Q1: Little interest or pleasure in doing things 0   Q2: Feeling down, depressed or hopeless 1   PHQ-2 Score 1   PHQ-2 Total Score (12-17 Years)- Positive if 3 or more points; Administer PHQ-A if positive -   Q1: Little interest or pleasure in doing things Not at all   Q2: Feeling down, depressed or hopeless Several days   PHQ-2 Score 1       Abuse: Current or Past(Physical, Sexual or Emotional)- No  Do you feel safe in your environment? Yes        Social History     Tobacco Use     Smoking status: Never Smoker     Smokeless tobacco: Never Used   Substance Use Topics     Alcohol use: Yes     Comment: ~ 1 drink/day     If you drink alcohol do you typically have >3 drinks per day or >7 drinks per week? No        No flowsheet data found.    Last PSA: No results found for: PSA    Reviewed orders with patient. Reviewed health maintenance and updated orders accordingly - Yes  Lab work is in process  Labs reviewed in EPIC  BP Readings from Last 3 Encounters:   06/02/22 102/76   01/17/22 118/74   05/27/21 102/52    Wt Readings from Last 3 Encounters:   06/02/22 73.3 kg (161 lb  9.6 oz)   01/17/22 76.7 kg (169 lb)   05/27/21 75.9 kg (167 lb 6.4 oz)                  Patient Active Problem List   Diagnosis     CARDIOVASCULAR SCREENING; LDL GOAL LESS THAN 100     Chronic lymphocytic thyroiditis     Type 1 diabetes mellitus without complication (H)     Advance Care Planning     Past Surgical History:   Procedure Laterality Date     HC TOOTH EXTRACTION W/FORCEP       VASECTOMY  01/2019       Social History     Tobacco Use     Smoking status: Never Smoker     Smokeless tobacco: Never Used   Substance Use Topics     Alcohol use: Yes     Comment: ~ 1 drink/day     Family History   Problem Relation Age of Onset     Hyperlipidemia Mother      Diabetes Brother      Hypertension Paternal Grandfather      Cerebrovascular Disease Paternal Grandfather      Hypertension Maternal Grandfather      Hyperlipidemia Maternal Grandfather      Cerebrovascular Disease Maternal Grandfather      Breast Cancer Maternal Aunt      Diabetes Brother      Anxiety Disorder Brother      Coronary Artery Disease Other         Maternal great grandfather     Hypertension Maternal Grandmother      Hyperlipidemia Maternal Grandmother      Hyperlipidemia Brother      Cerebrovascular Disease Paternal Grandmother      Asthma No family hx of      C.A.D. No family hx of      Cancer - colorectal No family hx of      Prostate Cancer No family hx of          Current Outpatient Medications   Medication Sig Dispense Refill     ACE/ARB NOT PRESCRIBED, INTENTIONAL, by Other route continuous prn.       acetone urine (KETOSTIX) test strip Use as directed in case of high blood sugar or illness. 25 each 3     alcohol swab prep pads Use to swab area of injection/azra as directed. 100 each 6     atorvastatin (LIPITOR) 10 MG tablet Take 1 tablet (10 mg) by mouth daily 90 tablet 3     blood glucose (ACCU-CHEK FASTCLIX) lancing device Lancing device to be used with lancets. 1 each 0     blood glucose (NO BRAND SPECIFIED) test strip Use to test blood  sugar 1 times daily or as directed. Any covered brand. 100 strip 11     blood glucose (NO BRAND SPECIFIED) test strip Use to test blood sugar 1 times daily or as directed. 100 strip 3     blood glucose monitoring (ACCU-CHEK FASTCLIX) lancets Use to test blood sugar daily and prn 102 each 6     blood glucose monitoring (NO BRAND SPECIFIED) meter device kit Use to test blood sugar up to 4 times daily or as directed. Any covered brand. 1 kit 0     blood glucose monitoring (NO BRAND SPECIFIED) meter device kit Use to test blood sugar 1 times daily or as directed. 1 kit 1     Continuous Blood Gluc Sensor (DEXCOM G6 SENSOR) MISC CHANGE  EVERY 10 DAYS 9 each 3     Continuous Blood Gluc Transmit (DEXCOM G6 TRANSMITTER) MISC 1 each every 3 months 1 each 3     glucagon (GLUCAGON EMERGENCY) 1 MG kit To treat very low blood sugar in an emergency Use as directed 2 each 2     Glucagon 3 MG/DOSE POWD Spray 3 mg in nostril as needed (Hypoglycemia) 3 each 3     insulin degludec (TRESIBA FLEXTOUCH) 100 UNIT/ML pen INJECT 9 UNITS UNDER THE SKIN ONCE DAILY 15 mL 1     insulin pen needle (PENTIPS) 31G X 5 MM miscellaneous Use 5 daily or as directed. 500 each 3     loratadine (CLARITIN) 10 MG tablet        naproxen sodium 220 MG capsule Take 220 mg by mouth 2 times daily (with meals)       NOVOLOG PENFILL 100 UNIT/ML soln 1 unit:15  grams of CHO with meals and snacks, and for pre-meal correction, approx 40 units daily 45 mL 3     PRECISION XTRA KETONE STRP Use as directed in case of high blood sugar or illness. 25 each 3     Sodium Fluoride 1.1 % PSTE Use once every night       VITAMIN D, CHOLECALCIFEROL, PO Take 1,000 Units by mouth daily (Patient not taking: Reported on 6/2/2022)         Reviewed and updated as needed this visit by clinical staff   Tobacco  Allergies  Meds                Reviewed and updated as needed this visit by Provider                       Review of Systems   Constitutional: Negative for chills and fever.  "  HENT: Negative for congestion, ear pain, hearing loss and sore throat.    Eyes: Negative for pain and visual disturbance.   Respiratory: Negative for cough and shortness of breath.    Cardiovascular: Negative for chest pain, palpitations and peripheral edema.   Gastrointestinal: Negative for abdominal pain, constipation, diarrhea, heartburn, hematochezia and nausea.   Genitourinary: Negative for dysuria, frequency, genital sores, hematuria, impotence, penile discharge and urgency.   Musculoskeletal: Positive for arthralgias and joint swelling. Negative for myalgias.   Skin: Negative for rash.   Neurological: Negative for dizziness, weakness, headaches and paresthesias.   Psychiatric/Behavioral: Negative for mood changes. The patient is not nervous/anxious.          OBJECTIVE:   /76 (BP Location: Right arm, Patient Position: Sitting, Cuff Size: Adult Regular)   Pulse 74   Temp 97.8  F (36.6  C) (Tympanic)   Resp 18   Ht 1.791 m (5' 10.5\")   Wt 73.3 kg (161 lb 9.6 oz)   SpO2 98%   BMI 22.86 kg/m      Physical Exam    General Appearance: Pleasant, alert, in no acute respiratory distress.  Head Exam: Normal. Normocephalic, atraumatic. No sinus tenderness.  Eye Exam: Normal external eye, conjunctiva, lids. DAVID.  Ear Exam: Normal auditory canals and external ears. Non-tender.  Left TM-normal. Right TM-normal.  Neck Exam: Supple, no obvious thyroid enlargement  Chest/Respiratory Exam: Normal, comfortable, easy respirations. Chest wall normal. Lungs are clear to auscultation. No wheezing, crackles, or rhonchi.  Cardiovascular Exam: Regular rate and rhythm. No murmur, gallop, or rubs.  Musculoskeletal Exam: Back is non-tender, full ROM of upper and lower extremities.  Skin: no rash, warm and dry.    Neurologic Exam: Nonfocal, no tremor. Normal gait.  Psychiatric Exam: Alert - appropriate, normal affect      ASSESSMENT/PLAN:       ICD-10-CM    1. Physical exam, routine  Z00.00 REVIEW OF HEALTH MAINTENANCE " "PROTOCOL ORDERS   2. Screen for colon cancer  Z12.11 Adult Gastro Ref - Procedure Only   3. Type 1 diabetes mellitus without complication (H)  E10.9    4. Plantar fasciitis  M72.2          COUNSELING:   Reviewed preventive health counseling, as reflected in patient instructions       Regular exercise       Healthy diet/nutrition     Colon cancer screening    Estimated body mass index is 22.86 kg/m  as calculated from the following:    Height as of this encounter: 1.791 m (5' 10.5\").    Weight as of this encounter: 73.3 kg (161 lb 9.6 oz).         He reports that he has never smoked. He has never used smokeless tobacco.      Counseling Resources:  ATP IV Guidelines  Pooled Cohorts Equation Calculator  FRAX Risk Assessment  ICSI Preventive Guidelines  Dietary Guidelines for Americans, 2010  USDA's MyPlate  ASA Prophylaxis  Lung CA Screening    Alcides Mariano MD  [facility]  "

## 2022-06-10 ENCOUNTER — TELEPHONE (OUTPATIENT)
Dept: GASTROENTEROLOGY | Facility: CLINIC | Age: 52
End: 2022-06-10
Payer: COMMERCIAL

## 2022-06-10 NOTE — TELEPHONE ENCOUNTER
Screening Questions    BlueKIND OF PREP RedLOCATION [review exclusion criteria] GreenSEDATION TYPE      1. Are you active on mychart? Y     2. What insurance is in the chart? BCBS      3.   Ordering/Referring Provider: Alcides Mariano MD     4. BMI   (If greater than 40 review exclusion criteria [PAC APPT IF [MAC] @ [facility])  23.1  [If yes, BMI OVER 40-EXTENDED PREP]      **(Sedation review/consideration needed)**  Do you have a legal guardian or Medical Power of    and/or are you able to give consent for your medical care?     SELF     5. Have you had a positive covid test in the last 90 days?   Y - 05/11/2022    6.  Are you currently on dialysis?   N [ If yes, G-PREP & HOSPITAL setting ONLY]     7.  Do you have chronic kidney disease?  N [ If yes, G-PREP ]    8.   Do you have a diagnosis of diabetes?   Y - DIABETIC/INSULIN DEPENDENT    [ If yes, G-PREP ]    9.  On a regular basis do you go 3-5 days between bowel movements?   N   [ If yes, EXTENDED PREP]    10.  Are you taking any prescription pain medications on a routine schedule?    N  [ If yes, EXTENDED PREP] [If yes, MAC]      11.   Do you have any chemical dependencies such as alcohol, street drugs, or methadone?    N [If yes, MAC]    12.   Do you have any history of post-traumatic stress syndrome, severe anxiety or history of psychosis?    N  [If yes, MAC]    13.  [FEMALES] Are you currently pregnant?     If yes, how many weeks?       Respiratory/Heart Screening:  [If yes to any of the following HOSPITAL setting only]     14. Do you have Pulmonary Hypertension [Lungs]?   N       15. Do you have UNCONTROLLED asthma?   N     16.  Do you use daily home oxygen?  N      17. Do you have mod to severe Obstructive Sleep Apnea?         (OKAY @ [facility]  [facility]  SH  PH  RI  MG - if pt is not on OXYGEN)  N      18.   Have you had a heart or lung transplant?   N      19.   Have you had a stroke or Transient ischemic attack (TIA - aka  mini stroke ) within 6  months?  (If yes, please review exclusion criteria)  N     20.   In the past 6 months, have you had any heart related issues including cardiomyopathy or heart attack?   N           If yes, did it require cardiac stenting or other implantable device?   N      21.   Do you have any implantable devices in your body (pacemaker, defib, LVAD)? (If yes, please review exclusion criteria)  N     22. Do you take nitroglycerin?   N           If yes, how often? N  (if yes, HOSPITAL setting ONLY)    23.  Are you currently taking any blood thinners?    [If yes, INFORM patient to follw up w/ ORDERING PROVIDER FOR BRIDGING INSTRUCTIONS]     N    24.   Do you transfer independently?                (If NO, please HOSPITAL setting ONLY)  Y    25.   Preferred LOCAL Pharmacy for Pre Prescription:         [facility] [address].      Scheduling Details  (Please ask for phone number if not scheduled by patient)      Caller : Franklin Muir    Date of Procedure: 08/08/2022  Surgeon: DAVID   Location: [facility]; [address] [place] [address], [address] Floor, [place] [address]         Sedation Type: CS  l PER PROTOCOL   Conscious Sedation- Needs  for 6 hours after the procedure  MAC/General-Needs  for 24 hours after procedure    N :[Pre-op Required] at [facility]  SH  MG and OR for MAC sedation   (advise patient they will need a pre-op WITH IN 30 DAYS of procedure date)     Type of Procedure Scheduled:   Lower Endoscopy [Colonoscopy]    Which Colonoscopy Prep was Sent?:   GOLY[facility] - DIABETIC     [facility] PATIENTS & [facility] PATIENTS NEEDS EXTENDED PREP       Informed patient they will need an adult  Y  Cannot take any type of public or medical transportation alone    Pre-Procedure Cov[facility] or Externally: Y  **INFORMED OF HOME TESTING & LAB OPTION**        Confirmed Nurse will call to complete assessment Y    Additional  comments: N

## 2022-06-29 ENCOUNTER — MYC MEDICAL ADVICE (OUTPATIENT)
Dept: ENDOCRINOLOGY | Facility: CLINIC | Age: 52
End: 2022-06-29

## 2022-06-29 DIAGNOSIS — E10.9 TYPE 1 DIABETES MELLITUS WITHOUT COMPLICATION (H): ICD-10-CM

## 2022-06-30 DIAGNOSIS — E10.65 TYPE 1 DIABETES MELLITUS WITH HYPERGLYCEMIA (H): ICD-10-CM

## 2022-06-30 DIAGNOSIS — E78.2 MIXED HYPERLIPIDEMIA: ICD-10-CM

## 2022-06-30 DIAGNOSIS — E10.9 TYPE 1 DIABETES MELLITUS WITHOUT COMPLICATION (H): ICD-10-CM

## 2022-06-30 RX ORDER — PEN NEEDLE, DIABETIC 31 GX3/16"
NEEDLE, DISPOSABLE MISCELLANEOUS
Qty: 500 EACH | Refills: 3 | Status: SHIPPED | OUTPATIENT
Start: 2022-06-30 | End: 2024-07-15

## 2022-06-30 NOTE — TELEPHONE ENCOUNTER
insulin pen needle (PENTIPS) 31G X 5 MM miscellaneous  Last Written Prescription Date:   4/23/2021  Last Fill Quantity: 500,   # refills: 3  Last Office Visit :  4/25/2022  Future Office visit:   8/1/2022  500 Each, 3 Refills sent to pharm 6/30/2022      Isa Resendiz RN  Central Triage Red Flags/Med Refills

## 2022-07-01 RX ORDER — PEN NEEDLE, DIABETIC 31 GX3/16"
NEEDLE, DISPOSABLE MISCELLANEOUS
Qty: 500 EACH | Refills: 3 | Status: SHIPPED | OUTPATIENT
Start: 2022-07-01 | End: 2023-08-03

## 2022-07-01 NOTE — TELEPHONE ENCOUNTER
JOSUE FLEXTOUCH 100UNIT/ML SOPN  Last Written Prescription Date:   10/12/2021  Last Fill Quantity: 15,   # refills: 1  Last Office Visit :  4/25/2022  Future Office visit:   8/1/2022  Routing refill request to provider for review/approval because:  Needing updated Creatinine labs  Refer to clinic/provider for review   Recent Labs   Lab Test 06/18/21  0952   CR 0.85     Isa Resendiz RN  Central Triage Red Flags/Med Refills    PENTIPS 31G X 5 MM MISC  500 each, 3 Refills sent to pharm 7/1/2022

## 2022-07-03 RX ORDER — INSULIN DEGLUDEC 100 U/ML
9 INJECTION, SOLUTION SUBCUTANEOUS DAILY
Qty: 15 ML | Refills: 3 | Status: SHIPPED | OUTPATIENT
Start: 2022-07-03 | End: 2023-08-14

## 2022-07-05 RX ORDER — ATORVASTATIN CALCIUM 10 MG/1
10 TABLET, FILM COATED ORAL DAILY
Qty: 90 TABLET | Refills: 3 | Status: SHIPPED | OUTPATIENT
Start: 2022-07-05 | End: 2023-08-03

## 2022-07-05 NOTE — TELEPHONE ENCOUNTER
atorvastatin (LIPITOR) 10 MG tablet      Last Written Prescription Date:  7/31/2021  Last Fill Quantity: 90,   # refills: 3  Last Office Visit : 4/25/2022 *Liz MEADOWS  Future Office visit:  8/1/2022    Routing refill request to provider for review/approval because:  Failed medication protocol: overdue lab  - LDL (done 6/18/2021)  - future Endocrine visit 8/1/2022    LDL Cholesterol Calculated   Date Value Ref Range Status   06/18/2021 63 <100 mg/dL Final     Comment:     Desirable:       <100 mg/dl

## 2022-07-25 NOTE — PROGRESS NOTES
"Outcome for 22 5:38 PM: Data uploaded on Dexcom  DANIELLE Candelaria      Start time: 953  End time: 1023      HPI:   Franklin is a 52 yo man here for follow up of type 1 diabetes, diagnosed in his 30's.  He also has been seen by Dr. Wilson for many years. He reports that he has been doing well.  He is currently taking Tresiba 9 units daily at 7 AM and Novolog 1 unit/20 grams of CHO with meals and snacks, and for pre-meal correction @ 1/2 U per 30 mg/dL above 150 during the day and over 200 at HS. 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  He has been largely avoiding hypoglycemia and finds the dexcom helpful at preventing this.  Amos really likes having a sensor and feels it helps him manage his diabetes better.  He has never worn a pump.  He does have strong symptoms alerting him to hypoglycemia.  His brother also had type 1 diabetes and  of DKA.    Glucose routinely climbs in the morning, just with coffee.  He takes a couple of units when he has the coffee.  Sometimes he drops later.     He had COVID earlier this year and was treated with paxlovid.  Mild symptoms and glucose was not out of control.     He sleeps on his stomach.  Some of the overnight lows are compression lows.      Franklin wears a Dexcom G6 sensor with overall average of 151 mg/dL (CV 29%) for the past two weeks. Recent glucose is as follows:             70g snack around midnight.     Protein bar for breakfast, \"normal\" lunch, dinner, and sizeable snack (70g) around 1am. He has noticed his glucose climbs around 3-4 in the morning.      Runs about 3 days a week. He continues running 5-6 miles at a time.   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 He has noticed more achy joints, ankles, plantar fasciitis.  One weekend day and depends on the weather.     Franklin also has a history of Hashimoto's ab positivity with normal " function, has never been on levothyroxine replacement therapy.  He has no palpitations. He generally has been feeling well and has no concerns today.     ROS   GENERAL: Weight stable. No fevers, chills, night sweats.      HEENT: no dysphagia, diplopia, neck pain or tenderness, dry/scratchy eyes, URI, cough, sinus drainage, tinnitus, sinus pressure  CV: no chest pain, pressure, palpitations, skipped beats, LOC  LUNGS: no SOB, FOUNTAIN, cough, sputum production, wheezing   GI: no diarrhea, constipation, abdominal pain  EXTREMITIES: +plantar fasciitis. No rashes, ulcers, edema  NEUROLOGY: no changes in vision, tingling or numbness in hands or feet.   MSK: no muscle aches or pains, weakness  PSYCH: mood stable    Past Medical History:   Diagnosis Date     Chronic lymphocytic thyroiditis 07/20/2011     Chronic lymphocytic thyroiditis 07/20/2011     Chronic lymphocytic thyroiditis      Depressive disorder 06/01/1997    Treated for a couple of years; not currently experiencing     Diabetes 06/23/2009       Past Surgical History:   Procedure Laterality Date     HC TOOTH EXTRACTION W/FORCEP       VASECTOMY  01/2019       Family History   Problem Relation Age of Onset     Hyperlipidemia Mother      Diabetes Brother      Hypertension Paternal Grandfather      Cerebrovascular Disease Paternal Grandfather      Hypertension Maternal Grandfather      Hyperlipidemia Maternal Grandfather      Cerebrovascular Disease Maternal Grandfather      Breast Cancer Maternal Aunt      Diabetes Brother      Anxiety Disorder Brother      Coronary Artery Disease Other         Maternal great grandfather     Hypertension Maternal Grandmother      Hyperlipidemia Maternal Grandmother      Hyperlipidemia Brother      Cerebrovascular Disease Paternal Grandmother      Asthma No family hx of      C.A.D. No family hx of      Cancer - colorectal No family hx of      Prostate Cancer No family hx of        Social History     Social History     Marital status:       Spouse name: N/A     Number of children: N/A     Years of education: N/A     Social History Main Topics     Smoking status: Never Smoker     Smokeless tobacco: Never Used     Alcohol use Yes      Comment: ~ 1 drink/day     Drug use: No     Sexual activity: Yes     Partners: Female     Birth control/ protection: Condom, Natural Family Planning      Comment: Would like to talk about vasectomy     Other Topics Concern     Parent/Sibling W/ Cabg, Mi Or Angioplasty Before 65f 55m? No     Social History Narrative   Social Hx:   .  Has a son born in 2011.   at the [facility] [employer]. Now . Physically active- running and playing with his son.    Current Outpatient Medications   Medication     atorvastatin (LIPITOR) 10 MG tablet     ACE/ARB NOT PRESCRIBED, INTENTIONAL,     acetone urine (KETOSTIX) test strip     alcohol swab prep pads     blood glucose (ACCU-CHEK FASTCLIX) lancing device     blood glucose (NO BRAND SPECIFIED) test strip     blood glucose (NO BRAND SPECIFIED) test strip     blood glucose monitoring (ACCU-CHEK FASTCLIX) lancets     blood glucose monitoring (NO BRAND SPECIFIED) meter device kit     blood glucose monitoring (NO BRAND SPECIFIED) meter device kit     Continuous Blood Gluc Sensor (DEXCOM G6 SENSOR) MISC     Continuous Blood Gluc Transmit (DEXCOM G6 TRANSMITTER) MISC     glucagon (GLUCAGON EMERGENCY) 1 MG kit     Glucagon 3 MG/DOSE POWD     insulin degludec (TRESIBA FLEXTOUCH) 100 UNIT/ML pen     insulin pen needle (PENTIPS) 31G X 5 MM miscellaneous     insulin pen needle (PENTIPS) 31G X 5 MM miscellaneous     loratadine (CLARITIN) 10 MG tablet     naproxen sodium 220 MG capsule     NOVOLOG PENFILL 100 UNIT/ML soln     PRECISION XTRA KETONE STRP     Sodium Fluoride 1.1 % PSTE     VITAMIN D, CHOLECALCIFEROL, PO     No current facility-administered medications for this visit.          Allergies   Allergen Reactions     Ceftin Rash     Seasonal  Allergies      Ampicillin Rash     Physical Exam  GENERAL: healthy, alert and no distress  RESP: no audible wheeze, cough, or visible cyanosis.  No visible retractions or increased work of breathing.  Able to speak fully in complete sentences.  PSYCH: mentation appears normal, affect normal/bright, judgement and insight intact, normal speech and appearance well-groomed    RESULTS  Lab Results   Component Value Date    A1C 6.6 (H) 07/26/2022    A1C 6.4 (H) 06/18/2021    A1C 6.8 (H) 07/03/2020    A1C 8.4 (H) 03/13/2017    A1C 7.6 (H) 02/18/2016    A1C 7.8 (H) 07/07/2015    HEMOGLOBINA1 6.4 01/17/2022    HEMOGLOBINA1 6.9 (A) 03/10/2020    HEMOGLOBINA1 6.8 (A) 09/10/2019    HEMOGLOBINA1 6.6 (A) 06/03/2019    HEMOGLOBINA1 7.0 (A) 03/12/2019       TSH   Date Value Ref Range Status   07/26/2022 2.02 0.40 - 4.00 mU/L Final   06/18/2021 2.39 0.40 - 4.00 mU/L Final   07/03/2020 2.72 0.40 - 4.00 mU/L Final   07/03/2020 2.72 0.40 - 4.00 mU/L Final   05/24/2019 1.89 0.40 - 4.00 mU/L Final   04/18/2018 2.20 0.40 - 4.00 mU/L Final     T4 Total   Date Value Ref Range Status   09/28/2010 9.0 5.0 - 11.0 ug/dL Final     T4 Free   Date Value Ref Range Status   02/18/2016 1.06 0.76 - 1.46 ng/dL Final   07/07/2015 1.04 0.76 - 1.46 ng/dL Final   01/05/2015 1.02 0.76 - 1.46 ng/dL Final     Comment:     Effective 7/30/2014, the reference range for this assay has changed to reflect   new instrumentation/methodology.     12/03/2013 1.08 0.70 - 1.85 ng/dL Final   01/26/2011 1.10 0.70 - 1.85 ng/dL Final       ALT   Date Value Ref Range Status   05/24/2019 25 0 - 70 U/L Final   03/13/2017 24 0 - 70 U/L Final   ]    Recent Labs   Lab Test 07/26/22  0929 06/18/21  0952 02/18/16  0837 01/05/15  0933   CHOL 106 121   < > 134   HDL 49 51   < > 47   LDL 47 63   < > 76   TRIG 52 35   < > 57   CHOLHDLRATIO  --   --   --  2.9    < > = values in this interval not displayed.       Lab Results   Component Value Date     07/26/2022     03/13/2017       Lab Results   Component Value Date    POTASSIUM 4.3 07/26/2022    POTASSIUM 3.9 06/18/2021     Lab Results   Component Value Date    CHLORIDE 105 07/26/2022    CHLORIDE 102 03/13/2017     Lab Results   Component Value Date    SHANIKA 9.1 07/26/2022    SHANIKA 9.1 03/13/2017     Lab Results   Component Value Date    CO2 29 07/26/2022    CO2 31 03/13/2017     Lab Results   Component Value Date    BUN 16 07/26/2022    BUN 15 03/13/2017     Lab Results   Component Value Date    CR 0.94 07/26/2022    CR 0.85 06/18/2021       GFR Estimate   Date Value Ref Range Status   07/26/2022 >90 >60 mL/min/1.73m2 Final     Comment:     Effective December 21, 2021 eGFRcr in adults is calculated using the 2021 CKD-EPI creatinine equation which includes age and gender (Michael valle al., NEJ, DOI: 10.1056/BKXZzw2795909)   06/18/2021 >90 >60 mL/min/[1.73_m2] Final     Comment:     Non  GFR Calc  Starting 12/18/2018, serum creatinine based estimated GFR (eGFR) will be   calculated using the Chronic Kidney Disease Epidemiology Collaboration   (CKD-EPI) equation.     07/03/2020 >90 >60 mL/min/[1.73_m2] Final     Comment:     Non  GFR Calc  Starting 12/18/2018, serum creatinine based estimated GFR (eGFR) will be   calculated using the Chronic Kidney Disease Epidemiology Collaboration   (CKD-EPI) equation.     05/24/2019 >90 >60 mL/min/[1.73_m2] Final     Comment:     Non  GFR Calc  Starting 12/18/2018, serum creatinine based estimated GFR (eGFR) will be   calculated using the Chronic Kidney Disease Epidemiology Collaboration   (CKD-EPI) equation.       GFR Estimate If Black   Date Value Ref Range Status   06/18/2021 >90 >60 mL/min/[1.73_m2] Final     Comment:      GFR Calc  Starting 12/18/2018, serum creatinine based estimated GFR (eGFR) will be   calculated using the Chronic Kidney Disease Epidemiology Collaboration   (CKD-EPI) equation.     07/03/2020 >90 >60 mL/min/[1.73_m2] Final  "    Comment:      GFR Calc  Starting 12/18/2018, serum creatinine based estimated GFR (eGFR) will be   calculated using the Chronic Kidney Disease Epidemiology Collaboration   (CKD-EPI) equation.     05/24/2019 >90 >60 mL/min/[1.73_m2] Final     Comment:      GFR Calc  Starting 12/18/2018, serum creatinine based estimated GFR (eGFR) will be   calculated using the Chronic Kidney Disease Epidemiology Collaboration   (CKD-EPI) equation.         Lab Results   Component Value Date    MICROL <5 07/26/2022    MICROL <5 06/18/2021     No results found for: MICROALBUMIN  Lab Results   Component Value Date    CPEPT 2.3 11/19/2009    GADAB >250.0 (H) 11/19/2009    ISCAB  09/08/2009     1:16  Reference range: <1:4  (Note)  TEST INFORMATION: Islet Cell Ab, IgG  Islet cell antibodies have been associated with  \"autoimmune\" endocrine disorders and insulin-dependent  diabetes. This disorder is characterized by the presence  of antibodies in patients that may be detected years  before the onset of the clinical symptoms. To calculate  Juvenile Diabetes Foundation (JDF) units: multiply the  titer x 5 (1:8  8 x 5 = 40 JDF Units).  Performed by [facility],  [address] [place] [id number] [phone]  [email or web address] Evelia Miles MD, Lab. Director       No results found for: B12]    Most recent eye exam date: : Not Found     Eye exam in January, 2022- no retinopathy.      Assessment/Plan:     1.  Type 1 diabetes- Franklin's glucose control is excellent. A1c is 6.8%.  His variability is low with very little hypoglycemia. We have discussed the In-pen in the past, but he feels he is doing fine on his current treatment plan. Discussed heart healthy eating ideas and encouraged patient to increase consumption of fruits, vegetables and whole grains (high fiber diet).  No other changes today.      2.  Risk factors-     Retinopathy:  No.  Had eye exam in January, 2022.    Nephropathy:  BP historically " well controlled. No microalbuminuria.  Creatinine stable.   Neuropathy: No.    Feet: OK, no ulcers. Plantar fasciitis currently.  Taking tylenol and naproxen is helpful.  A little more soreness in his feet.  No swelling in joints.  Will refer to podiatry.    Lipids:  LDL at target.  Patient taking statin  Celiac screening: negative 2018    3.  Positive TPO antibodies-TSH normal on no therapy. Normal TSH 7/2022.     4. F/U in 6 months to establish with new endocrinologist, Dr. Donahue, in 1 year with me.     28 minutes spent on the date of the encounter doing chart review, review of test results, review of continuous glucose sensor, interpretation of glucose data, patient visit and documentation, counseling/coordination of care, and discussion of follow up plan for worsening hyper and hypoglycemia.  The patient understood and is satisfied with today's visit.      Liz Conroy PA-C, MPAS   [facility]  Department of Medicine  Division of Endocrinology and Diabetes      Answers for HPI/ROS submitted by the patient on 7/29/2022  If you checked off any problems, how difficult have these problems made it for you to do your work, take care of things at home, or get along with other people?: Not difficult at all  PHQ9 TOTAL SCORE: 1  General Symptoms: No  Skin Symptoms: No  HENT Symptoms: No  EYE SYMPTOMS: No  HEART SYMPTOMS: No  LUNG SYMPTOMS: No  INTESTINAL SYMPTOMS: No  URINARY SYMPTOMS: No  REPRODUCTIVE SYMPTOMS: No  SKELETAL SYMPTOMS: Yes  BLOOD SYMPTOMS: No  NERVOUS SYSTEM SYMPTOMS: No  MENTAL HEALTH SYMPTOMS: No  Back pain: No  Muscle aches: No  Neck pain: No  Swollen joints: No  Joint pain: Yes  Bone pain: No  Muscle cramps: No  Muscle weakness: No  Joint stiffness: Yes  Bone fracture: No

## 2022-07-26 ENCOUNTER — LAB (OUTPATIENT)
Dept: LAB | Facility: CLINIC | Age: 52
End: 2022-07-26

## 2022-07-26 DIAGNOSIS — E10.9 TYPE 1 DIABETES MELLITUS WITHOUT COMPLICATION (H): ICD-10-CM

## 2022-07-26 DIAGNOSIS — E10.9 WELL CONTROLLED TYPE 1 DIABETES MELLITUS (H): ICD-10-CM

## 2022-07-26 LAB — HBA1C MFR BLD: 6.6 % (ref 0–5.6)

## 2022-07-26 PROCEDURE — 84443 ASSAY THYROID STIM HORMONE: CPT

## 2022-07-26 PROCEDURE — 80048 BASIC METABOLIC PNL TOTAL CA: CPT

## 2022-07-26 PROCEDURE — 80061 LIPID PANEL: CPT

## 2022-07-26 PROCEDURE — 36415 COLL VENOUS BLD VENIPUNCTURE: CPT

## 2022-07-26 PROCEDURE — 82043 UR ALBUMIN QUANTITATIVE: CPT

## 2022-07-26 PROCEDURE — 83036 HEMOGLOBIN GLYCOSYLATED A1C: CPT

## 2022-07-27 LAB
ANION GAP SERPL CALCULATED.3IONS-SCNC: 4 MMOL/L (ref 3–14)
BUN SERPL-MCNC: 16 MG/DL (ref 7–30)
CALCIUM SERPL-MCNC: 9.1 MG/DL (ref 8.5–10.1)
CHLORIDE BLD-SCNC: 105 MMOL/L (ref 94–109)
CHOLEST SERPL-MCNC: 106 MG/DL
CO2 SERPL-SCNC: 29 MMOL/L (ref 20–32)
CREAT SERPL-MCNC: 0.94 MG/DL (ref 0.66–1.25)
CREAT UR-MCNC: 50 MG/DL
FASTING STATUS PATIENT QL REPORTED: YES
GFR SERPL CREATININE-BSD FRML MDRD: >90 ML/MIN/1.73M2
GLUCOSE BLD-MCNC: 140 MG/DL (ref 70–99)
HDLC SERPL-MCNC: 49 MG/DL
LDLC SERPL CALC-MCNC: 47 MG/DL
MICROALBUMIN UR-MCNC: <5 MG/L
MICROALBUMIN/CREAT UR: NORMAL MG/G{CREAT}
NONHDLC SERPL-MCNC: 57 MG/DL
POTASSIUM BLD-SCNC: 4.3 MMOL/L (ref 3.4–5.3)
SODIUM SERPL-SCNC: 138 MMOL/L (ref 133–144)
TRIGL SERPL-MCNC: 52 MG/DL
TSH SERPL DL<=0.005 MIU/L-ACNC: 2.02 MU/L (ref 0.4–4)

## 2022-07-29 ASSESSMENT — ENCOUNTER SYMPTOMS
STIFFNESS: 1
ARTHRALGIAS: 1
MYALGIAS: 0
BACK PAIN: 0
MUSCLE WEAKNESS: 0
JOINT SWELLING: 0
MUSCLE CRAMPS: 0
NECK PAIN: 0

## 2022-07-29 ASSESSMENT — PATIENT HEALTH QUESTIONNAIRE - PHQ9
SUM OF ALL RESPONSES TO PHQ QUESTIONS 1-9: 1
SUM OF ALL RESPONSES TO PHQ QUESTIONS 1-9: 1
10. IF YOU CHECKED OFF ANY PROBLEMS, HOW DIFFICULT HAVE THESE PROBLEMS MADE IT FOR YOU TO DO YOUR WORK, TAKE CARE OF THINGS AT HOME, OR GET ALONG WITH OTHER PEOPLE: NOT DIFFICULT AT ALL

## 2022-08-01 ENCOUNTER — VIRTUAL VISIT (OUTPATIENT)
Dept: ENDOCRINOLOGY | Facility: CLINIC | Age: 52
End: 2022-08-01
Payer: COMMERCIAL

## 2022-08-01 DIAGNOSIS — E10.9 TYPE 1 DIABETES MELLITUS WITHOUT COMPLICATION (H): Primary | ICD-10-CM

## 2022-08-01 DIAGNOSIS — E10.65 TYPE 1 DIABETES MELLITUS WITH HYPERGLYCEMIA (H): ICD-10-CM

## 2022-08-01 PROCEDURE — 99214 OFFICE O/P EST MOD 30 MIN: CPT | Mod: 95 | Performed by: PHYSICIAN ASSISTANT

## 2022-08-01 RX ORDER — BISACODYL 5 MG
TABLET, DELAYED RELEASE (ENTERIC COATED) ORAL
Qty: 4 TABLET | Refills: 0 | Status: SHIPPED | OUTPATIENT
Start: 2022-08-01 | End: 2024-01-30

## 2022-08-01 RX ORDER — GLUCAGON 3 MG/1
1 POWDER NASAL
Qty: 2 EACH | Refills: 3 | Status: SHIPPED | OUTPATIENT
Start: 2022-08-01 | End: 2024-07-15

## 2022-08-01 NOTE — PATIENT INSTRUCTIONS
Nice seeing you today, Amos!    Your glucose control is excellent.      Try wearing your sensor on your arm to prevent compression lows overnight.                                            Heart Healthy Diet and Lifestyle    - Eat your veggies and fruits -- and switch to whole grains. An abundance and variety of plant foods should make up the majority of your meals. Strive for seven to 10 servings a day of veggies and fruits. Add in beans and lentils.  Switch to whole-grain bread and cereal, and begin to eat more whole-grain rice and pasta products.    - Go nuts. Keep almonds, cashews, pistachios and walnuts on hand for a quick snack. Choose natural peanut butter or almond butter, rather than the kind with hydrogenated fat added. Try hummus as a dip or spread for bread.    - Pass on the butter. Try olive or canola oil as a healthy replacement for butter or margarine. Use it in cooking. Dip bread in flavored olive oil or lightly spread it on whole-grain bread for a tasty alternative to butter.     - Spice it up. Herbs and spices make food tasty and are also rich in health-promoting substances. Season your meals with herbs and spices rather than salt.    - Go fish. Eat fish twice a week. Fresh or water-packed tuna, salmon, trout, mackerel and herring are healthy choices. Grilled fish tastes good and requires little cleanup. Avoid fried fish, unless it's sauteed in a small amount of canola oil.    - Rein in the red meat. Substitute fish and poultry for red meat. When eaten, make sure it's lean and keep portions small (about the size of a deck of cards). Try to limit sausage, cuevas and other high-fat or processed meats.    - Avoid being sedentary.  Decrease the amount of time spent in daily sedentary behavior.  Prolonged sitting should be interrupted every 30 min for blood glucose benefits.     - Be active.  30 minutes of moderate-to-vigorous intensity aerobic exercise at least 5 days a week or a total of 150 minutes  spread over 3 days per week.  2-3 sessions/week of resistance exercise on nonconsecutive days. Flexibility training and balance training 2-3 times/week for older adults with diabetes.     Please let me know if you are having low blood sugars less than 70 or over 250 mg/dL.      If you have concerns, please send me a [employer] message M-Th, call the clinic at [phone], or call [phone] after hours/weekends and ask to speak with the endocrinologist on call.

## 2022-08-08 ENCOUNTER — HOSPITAL ENCOUNTER (OUTPATIENT)
Facility: AMBULATORY SURGERY CENTER | Age: 52
Discharge: HOME | End: 2022-08-08
Attending: SURGERY | Admitting: SURGERY
Payer: COMMERCIAL

## 2022-08-08 VITALS
DIASTOLIC BLOOD PRESSURE: 71 MMHG | SYSTOLIC BLOOD PRESSURE: 108 MMHG | OXYGEN SATURATION: 97 % | RESPIRATION RATE: 16 BRPM | HEART RATE: 69 BPM | TEMPERATURE: 97 F

## 2022-08-08 DIAGNOSIS — Z12.11 SCREENING FOR COLON CANCER: Primary | ICD-10-CM

## 2022-08-08 LAB
COLONOSCOPY: NORMAL
GLUCOSE BLDC GLUCOMTR-MCNC: 106 MG/DL (ref 70–99)

## 2022-08-08 PROCEDURE — G8918 PT W/O PREOP ORDER IV AB PRO: HCPCS

## 2022-08-08 PROCEDURE — 45378 DIAGNOSTIC COLONOSCOPY: CPT

## 2022-08-08 PROCEDURE — G8907 PT DOC NO EVENTS ON DISCHARG: HCPCS

## 2022-08-08 RX ORDER — NALOXONE HYDROCHLORIDE 0.4 MG/ML
0.2 INJECTION, SOLUTION INTRAMUSCULAR; INTRAVENOUS; SUBCUTANEOUS
Status: DISCONTINUED | OUTPATIENT
Start: 2022-08-08 | End: 2022-08-09 | Stop reason: HOSPADM

## 2022-08-08 RX ORDER — ONDANSETRON 2 MG/ML
4 INJECTION INTRAMUSCULAR; INTRAVENOUS
Status: DISCONTINUED | OUTPATIENT
Start: 2022-08-08 | End: 2022-08-09 | Stop reason: HOSPADM

## 2022-08-08 RX ORDER — NALOXONE HYDROCHLORIDE 0.4 MG/ML
0.4 INJECTION, SOLUTION INTRAMUSCULAR; INTRAVENOUS; SUBCUTANEOUS
Status: DISCONTINUED | OUTPATIENT
Start: 2022-08-08 | End: 2022-08-09 | Stop reason: HOSPADM

## 2022-08-08 RX ORDER — PROCHLORPERAZINE MALEATE 10 MG
10 TABLET ORAL EVERY 6 HOURS PRN
Status: DISCONTINUED | OUTPATIENT
Start: 2022-08-08 | End: 2022-08-09 | Stop reason: HOSPADM

## 2022-08-08 RX ORDER — LIDOCAINE 40 MG/G
CREAM TOPICAL
Status: DISCONTINUED | OUTPATIENT
Start: 2022-08-08 | End: 2022-08-09 | Stop reason: HOSPADM

## 2022-08-08 RX ORDER — ONDANSETRON 2 MG/ML
4 INJECTION INTRAMUSCULAR; INTRAVENOUS EVERY 6 HOURS PRN
Status: DISCONTINUED | OUTPATIENT
Start: 2022-08-08 | End: 2022-08-09 | Stop reason: HOSPADM

## 2022-08-08 RX ORDER — FLUMAZENIL 0.1 MG/ML
0.2 INJECTION, SOLUTION INTRAVENOUS
Status: ACTIVE | OUTPATIENT
Start: 2022-08-08 | End: 2022-08-08

## 2022-08-08 RX ORDER — FENTANYL CITRATE 50 UG/ML
INJECTION, SOLUTION INTRAMUSCULAR; INTRAVENOUS PRN
Status: DISCONTINUED | OUTPATIENT
Start: 2022-08-08 | End: 2022-08-08 | Stop reason: HOSPADM

## 2022-08-08 RX ORDER — ONDANSETRON 4 MG/1
4 TABLET, ORALLY DISINTEGRATING ORAL EVERY 6 HOURS PRN
Status: DISCONTINUED | OUTPATIENT
Start: 2022-08-08 | End: 2022-08-09 | Stop reason: HOSPADM

## 2022-08-08 NOTE — H&P
[facility] Pre-op History and Physical    Franklin Muir MRN# [id number]   Age: 52 year old YOB: 1970      Date of Surgery: 08/08/22   [facility]      Date of Exam 8/8/2022 Facility (Same day)       Home clinic:   Primary care provider: Ruddy Malave         Chief Complaint and/or Reason for Procedure:   No chief complaint on file.  colon screening         Active problem list:     Patient Active Problem List    Diagnosis Date Noted     Plantar fasciitis 06/02/2022     Priority: Medium     Advance Care Planning 02/24/2016     Priority: Medium     Advance Care Planning 2/24/2016: Receipt of ACP document:  Received: Health Care Directive which was witnessed or notarized on 10/29/2013.  Document not previously scanned.  Validation form completed and sent with document to be scanned.  Code Status reflects choices in most recent ACP document.  Confirmed/documented designated decision maker(s).  Added by Doreen Green             Type 1 diabetes mellitus without complication (H) 10/16/2015     Priority: Medium     [facility]       Chronic lymphocytic thyroiditis 07/20/2011     Priority: Medium     CARDIOVASCULAR SCREENING; LDL GOAL LESS THAN 100 05/09/2010     Priority: Medium            Medications (include herbals and vitamins):   Any Plavix use in the last 7 days?  No     Current Outpatient Medications   Medication Sig     atorvastatin (LIPITOR) 10 MG tablet Take 1 tablet (10 mg) by mouth daily     bisacodyl (DULCOLAX) 5 MG EC tablet Take two (2) tablet at 4 pm the day before your procedure.  If your procedure is before 11 am, take two (2) additional tablets at 8 pm.  If your procedure is after 11 am, take two (2) additional tablets at 6 am. For additional instructions refer to your colonoscopy prep instructions.     Glucagon (BAQSIMI TWO PACK) 3 MG/DOSE POWD Spray 1 each in nostril once as needed (severe  hypoglycemia)     glucagon (GLUCAGON EMERGENCY) 1 MG kit To treat very low blood sugar in an emergency Use as directed     insulin degludec (TRESIBA FLEXTOUCH) 100 UNIT/ML pen Inject 9 Units Subcutaneous daily     loratadine (CLARITIN) 10 MG tablet      naproxen sodium 220 MG capsule Take 220 mg by mouth 2 times daily (with meals)     NOVOLOG PENFILL 100 UNIT/ML soln 1 unit:15  grams of CHO with meals and snacks, and for pre-meal correction, approx 40 units daily     polyethylene glycol (GOLYTELY) 236 g suspension The night before the exam: at 6 pm start drinking an 8-ounce glass every 15 minutes until the jug is half empty (about 8 glasses). Day of exam: 6 hours before your exam check-in Drink the other half of the jug. You should finish the prep 4 hours before the exam. For additional instructions refer to your colonoscopy prep instructions.     Sodium Fluoride 1.1 % PSTE Use once every night     VITAMIN D, CHOLECALCIFEROL, PO Take 1,000 Units by mouth daily     ACE/ARB NOT PRESCRIBED, INTENTIONAL, by Other route continuous prn.     acetone urine (KETOSTIX) test strip Use as directed in case of high blood sugar or illness.     alcohol swab prep pads Use to swab area of injection/azra as directed.     blood glucose (ACCU-CHEK FASTCLIX) lancing device Lancing device to be used with lancets.     blood glucose (NO BRAND SPECIFIED) test strip Use to test blood sugar 1 times daily or as directed. Any covered brand.     blood glucose monitoring (ACCU-CHEK FASTCLIX) lancets Use to test blood sugar daily and prn     blood glucose monitoring (NO BRAND SPECIFIED) meter device kit Use to test blood sugar up to 4 times daily or as directed. Any covered brand.     blood glucose monitoring (NO BRAND SPECIFIED) meter device kit Use to test blood sugar 1 times daily or as directed.     Continuous Blood Gluc Sensor (DEXCOM G6 SENSOR) MISC CHANGE  EVERY 10 DAYS     Continuous Blood Gluc Transmit (DEXCOM G6 TRANSMITTER) MISC 1 each  every 3 months     insulin pen needle (PENTIPS) 31G X 5 MM miscellaneous Use 5 daily or as directed.     insulin pen needle (PENTIPS) 31G X 5 MM miscellaneous Use 5 daily or as directed.     PRECISION XTRA KETONE STRP Use as directed in case of high blood sugar or illness.     Current Facility-Administered Medications   Medication     lidocaine (LMX4) kit     lidocaine 1 % 0.1-1 mL     ondansetron (ZOFRAN) injection 4 mg     sodium chloride (PF) 0.9% PF flush 3 mL     sodium chloride (PF) 0.9% PF flush 3 mL             Allergies:      Allergies   Allergen Reactions     Ceftin Rash     Seasonal Allergies      Ampicillin Rash     Allergy to Latex? No  Allergy to tape?   No  Intolerances:             Physical Exam:   All vitals have been reviewed  Patient Vitals for the past 8 hrs:   BP Temp Temp src Resp SpO2   08/08/22 0820 109/63 97  F (36.1  C) Temporal 16 97 %     No intake/output data recorded.  Airway assessment:   Patient is able to open mouth wide  Patient is able to stick out tongue  Mallampatti classification: Class II (visualization of the soft palate, fauces, and uvula)}      ENT:   Normocephalic, without obvious abnormality, atraumatic, sinuses nontender on palpation, external ears without lesions, oral pharynx with moist mucous membranes, tonsils without erythema or exudates, gums normal and good dentition.     Neck:   Supple, symmetrical, trachea midline, no adenopathy, thyroid symmetric, not enlarged and no tenderness, skin normal     Lungs:   No increased work of breathing, good air exchange, clear to auscultation bilaterally, no crackles or wheezing     Cardiovascular:   Normal apical impulse, regular rate and rhythm, normal S1 and S2, no S3 or S4, and no murmur noted             Lab / Radiology Results:     Lab Results   Component Value Date    WBC 5.6 07/03/2020    RBC 5.07 07/03/2020    HGB 13.8 07/03/2020    HCT 40.1 07/03/2020    MCV 79 07/03/2020    RDW 13.1 07/03/2020     07/03/2020              Anesthetic risk and/or ASA classification:   2    Ashlyn Xavier MD

## 2022-11-10 NOTE — TELEPHONE ENCOUNTER
RECORDS RECEIVED FROM: internal    DATE RECEIVED: 2.2.23    NOTES (FOR ALL VISITS) STATUS DETAILS   OFFICE NOTES from referring provider internal  Liz Conroy PA-C   OFFICE NOTES from other specialist internal  6.2.22 García   9.21.21 Steve   5.27.21 Rylee       MEDICATION LIST internal       LABS     DIABETES: HBGA1C, CREATININE, FASTING LIPIDS, MICROALBUMIN URINE, POTASSIUM, TSH, T4  THYROID: TSH, T4, CBC, THYRODLONULIN, TOTAL T3, FREE T4, CALCITONIN, CEA internal

## 2022-11-11 ENCOUNTER — IMMUNIZATION (OUTPATIENT)
Dept: PEDIATRICS | Facility: CLINIC | Age: 52
End: 2022-11-11
Payer: COMMERCIAL

## 2022-11-11 PROCEDURE — 0124A COVID-19,PF,PFIZER BOOSTER BIVALENT: CPT

## 2022-11-11 PROCEDURE — 91312 COVID-19,PF,PFIZER BOOSTER BIVALENT: CPT

## 2023-01-27 NOTE — PROGRESS NOTES
Outcome for 01/27/23 2:32 PM :BG data will be sent to provider on 2/1.  Alice Pugh  Outcome for 02/01/23 11:33 AM :Glucose sent via Email   Alice Pugh

## 2023-02-01 NOTE — PROGRESS NOTES
Subjective:    Established patient, he previously saw Dr. Wilson (most recently 2021) and he follows closely with Liz HARRIS) with most recent appointment 2022    Franklin Muir is a 52 year old male who presents for DM-1. 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.    Diagnosed with DM: diagnosed in  and the diagnosis was incidentally on screening labs - insulin was first started a few years/month after time of diagnosis    History of DKA/HHS: no    FH of DM: brother with T1DM -  at age 26 from DKA    Glycemic control over the years:           Current DM therapy:  -Tresiba 9-0-0-0  -NovoLog ICR 1:15, CS: 1/2 unit per 30 mg/dL >150 mg/dL during the day and 1/2 unit per 60 mg/dL >150 mg/dL at bedtime     Prior DM therapy:  -No prior use of insulin pump therapy     Current glycemic control:        Diet: 3 meals/day, snacks at night     Physical activity: he runs a few times per week     Tobacco/alcohol use: no tobacco use, 1 beer at night     Complications noted in the A/P section.     Objective:    BMI 23.6 kg/m2, blood pressure 131/82    Appears well.  Thyroid examined and normal.  No cervical lymphadenopathy.  No lipohypertrophy at his abdominal insulin injection sites.  Diabetic foot exam performed and normal.    Assessment/Plan:    # DM-  -: DEE Ab >250 (ULN 5.0 international unit(s)/mL)   -2009: C-peptide 2.3 ng/mL with concurrent glucose 84 mg/dL  -He has glucagon available PRN   # No history of retinopathy, due for a DM eye exam   -He will set this up locally   # No hypertension, no nephropathy, not on an ACE-I/ARB    -2022: GFR >90, urine microalbumin undetectable   # No peripheral neuropathy, no prior foot ulceration   # No prior ASCVD event, not on ASA, on atorvastatin 10 mg daily     -2022: , TG 52, HDL 49, LDL 47  # Hashimoto's thyroiditis   -TSH and free T4 always normal previously, most recently 2022 TSH 2.02  -: elevated TPO Ab   -No prior use of thyroid  hormone   # Other  -2018: Celiac screen normal     We reviewed his CGM data in detail.  Overnight his glycemic trend is steady overnight and in range.  He does have postprandial hyperglycemia at times and this does mainly occur after breakfast.  We reviewed that if this trend continues he can adjust his insulin to carbohydrate ratio to 1:12.  I would not make any other changes with insulin dosing.  Because of insurance changes we switched him to glargine U-100 (give at bedtime) and Humalog.  We also reviewed insulin pump therapy in detail including OmniPod.  He will let us know if he wants to meet with the CDE to review this in detail.    I put in orders for labs to be done in about 6 months.  He will see Liz HARRIS) in 6 months and then see me in 12 months.    45 minutes spent on the date of the encounter doing chart review, history and exam, documentation and further activities as noted above.  This did not include time spent on CGM review.

## 2023-02-02 ENCOUNTER — PRE VISIT (OUTPATIENT)
Dept: ENDOCRINOLOGY | Facility: CLINIC | Age: 53
End: 2023-02-02

## 2023-02-02 ENCOUNTER — OFFICE VISIT (OUTPATIENT)
Dept: ENDOCRINOLOGY | Facility: CLINIC | Age: 53
End: 2023-02-02
Payer: COMMERCIAL

## 2023-02-02 VITALS
DIASTOLIC BLOOD PRESSURE: 82 MMHG | WEIGHT: 167 LBS | SYSTOLIC BLOOD PRESSURE: 131 MMHG | HEART RATE: 72 BPM | BODY MASS INDEX: 23.62 KG/M2

## 2023-02-02 DIAGNOSIS — E10.9 TYPE 1 DIABETES MELLITUS WITHOUT COMPLICATION (H): Primary | ICD-10-CM

## 2023-02-02 DIAGNOSIS — E06.3 HASHIMOTO'S THYROIDITIS: ICD-10-CM

## 2023-02-02 DIAGNOSIS — Z79.4 LONG TERM (CURRENT) USE OF INSULIN (H): ICD-10-CM

## 2023-02-02 LAB — HBA1C MFR BLD: 6.6 % (ref 4.3–?)

## 2023-02-02 PROCEDURE — 95251 CONT GLUC MNTR ANALYSIS I&R: CPT | Performed by: INTERNAL MEDICINE

## 2023-02-02 PROCEDURE — 99215 OFFICE O/P EST HI 40 MIN: CPT | Mod: 25 | Performed by: INTERNAL MEDICINE

## 2023-02-02 PROCEDURE — 83036 HEMOGLOBIN GLYCOSYLATED A1C: CPT | Performed by: INTERNAL MEDICINE

## 2023-02-02 RX ORDER — INSULIN LISPRO 100 [IU]/ML
INJECTION, SOLUTION SUBCUTANEOUS
Qty: 30 ML | Refills: 4 | Status: SHIPPED | OUTPATIENT
Start: 2023-02-02 | End: 2024-02-22

## 2023-02-02 ASSESSMENT — PAIN SCALES - GENERAL: PAINLEVEL: NO PAIN (0)

## 2023-02-02 NOTE — LETTER
2023       RE: Franklin Muir  [address]  [place] [address]     Dear Colleague,    Thank you for referring your patient, Franklin Muir, to the [facility] [place] [facility]. Please see a copy of my visit note below.    Outcome for 23 2:32 PM :BG data will be sent to provider on .  Alice Pugh  Outcome for 23 11:33 AM :Glucose sent via Email   Alice Pugh            Subjective:    Established patient, he previously saw Dr. Wilson (most recently 2021) and he follows closely with Liz Conroy ([facility]) with most recent appointment 2022    Franklin Muir is a 52 year old male who presents for DM-1. 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    Diagnosed with DM: diagnosed in  and the diagnosis was incidentally on screening labs - insulin was first started a few years/month after time of diagnosis    History of DKA/HHS: no    FH of DM: brother with T1DM -  at age 26 from DKA    Glycemic control over the years:           Current DM therapy:  -Tresiba 9-0-0-0  -NovoLog ICR 1:15, CS: 1/2 unit per 30 mg/dL >150 mg/dL during the day and 1/2 unit per 60 mg/dL >150 mg/dL at bedtime     Prior DM therapy:  -No prior use of insulin pump therapy     Current glycemic control:        Diet: 3 meals/day, snacks at night     Physical activity: he runs a few times per week     Tobacco/alcohol use: no tobacco use, 1 beer at night     Complications noted in the A/P section.     Objective:    BMI 23.6 kg/m2, blood pressure 131/82    Appears well.  Thyroid examined and normal.  No cervical lymphadenopathy.  No lipohypertrophy at his abdominal insulin injection sites.  Diabetic foot exam performed and normal.    Assessment/Plan:    # DM-1  -: DEE Ab >250 (ULN 5.0 international unit(s)/mL)   -2009: C-peptide 2.3 ng/mL with concurrent glucose 84 mg/dL  -He has glucagon available PRN   # No history of  retinopathy, due for a DM eye exam   -He will set this up locally   # No hypertension, no nephropathy, not on an ACE-I/ARB    -7/2022: GFR >90, urine microalbumin undetectable   # No peripheral neuropathy, no prior foot ulceration   # No prior ASCVD event, not on ASA, on atorvastatin 10 mg daily     -7/2022: , TG 52, HDL 49, LDL 47  # Hashimoto's thyroiditis   -TSH and free T4 always normal previously, most recently 7/2022 TSH 2.02  -2009: elevated TPO Ab   -No prior use of thyroid hormone   # Other  -2018: Celiac screen normal     We reviewed his CGM data in detail.  Overnight his glycemic trend is steady overnight and in range.  He does have postprandial hyperglycemia at times and this does mainly occur after breakfast.  We reviewed that if this trend continues he can adjust his insulin to carbohydrate ratio to 1:12.  I would not make any other changes with insulin dosing.  Because of insurance changes we switched him to glargine U-100 (give at bedtime) and Humalog.  We also reviewed insulin pump therapy in detail including OmniPod.  He will let us know if he wants to meet with the CDE to review this in detail.    I put in orders for labs to be done in about 6 months.  He will see Liz HARRIS) in 6 months and then see me in 12 months.    45 minutes spent on the date of the encounter doing chart review, history and exam, documentation and further activities as noted above.  This did not include time spent on CGM review.    Sincerely,    Neto Donahue MD

## 2023-02-02 NOTE — NURSING NOTE
"Chief Complaint   Patient presents with     Diabetes     Vital signs:      BP: 131/82 Pulse: 72             Weight: 75.8 kg (167 lb)  Estimated body mass index is 23.62 kg/m  as calculated from the following:    Height as of 6/2/22: 1.791 m (5' 10.5\").    Weight as of this encounter: 75.8 kg (167 lb).          "

## 2023-03-06 DIAGNOSIS — E10.9 WELL CONTROLLED TYPE 1 DIABETES MELLITUS (H): ICD-10-CM

## 2023-03-09 RX ORDER — PROCHLORPERAZINE 25 MG/1
1 SUPPOSITORY RECTAL
Qty: 1 EACH | Refills: 3 | Status: SHIPPED | OUTPATIENT
Start: 2023-03-09 | End: 2024-02-29

## 2023-03-09 NOTE — TELEPHONE ENCOUNTER
DEXCOM G6 TRANSMITTER  MISC    Office Visit    2/2/2023  [facility] [place]   Neto Donahue MD  Endocrinology, Diabetes, and Metabolism

## 2023-03-14 ENCOUNTER — TRANSFERRED RECORDS (OUTPATIENT)
Dept: HEALTH INFORMATION MANAGEMENT | Facility: CLINIC | Age: 53
End: 2023-03-14
Payer: COMMERCIAL

## 2023-03-14 LAB — RETINOPATHY: NEGATIVE

## 2023-04-10 ENCOUNTER — NURSE TRIAGE (OUTPATIENT)
Dept: NURSING | Facility: CLINIC | Age: 53
End: 2023-04-10

## 2023-04-10 ENCOUNTER — VIRTUAL VISIT (OUTPATIENT)
Dept: URGENT CARE | Facility: CLINIC | Age: 53
End: 2023-04-10
Payer: COMMERCIAL

## 2023-04-10 DIAGNOSIS — E10.9 TYPE 1 DIABETES MELLITUS WITHOUT COMPLICATION (H): ICD-10-CM

## 2023-04-10 DIAGNOSIS — U07.1 SARS-COV-2 POSITIVE: ICD-10-CM

## 2023-04-10 PROCEDURE — 99213 OFFICE O/P EST LOW 20 MIN: CPT | Mod: CS

## 2023-04-10 NOTE — TELEPHONE ENCOUNTER
Covid positive home test 4-10-23.    Scratchy throat nasal congestion, loose stools that started today.       [place]ather patient reported symptoms   Assessment   Current Symptoms at time of phone call, reported by patient: (if no symptoms, document: No symptoms] Scratchy throat, nasal congestion, lose stool   Date of symptom(s) onset (if applicable) 4-10-23     If at time of call, Patients symptoms have worsened, the Patient should contact 911 or have someone drive them to Emergency Dept promptly:      If Patient calling 911, inform 911 personal that you have tested positive for the Coronavirus (COVID-19).  Place mask on and await 911 to arrive.    If Emergency Dept, If possible, please have another adult drive you to the Emergency Dept but you need to wear mask when in contact with other people.      Treatment Options:   Is patient interested in discussing COVID treatment? Yes.   Is this a [place] or [place] Patient? No:     Are you an agent trained to scheduling? No.   COVID Positive/Requesting COVID treatment    Patient is positive for COVID and requesting treatment options.    Date of positive COVID test (PCR or at home)? 4-110-23  Current COVID symptoms: sore throat and congestion or runny nose  Date COVID symptoms began: 4-10-23    Message should be routed to clinic RN pool. Best phone number to use for call back: [phone].    Patient says he does not have a current IM/FP PCP.  Transferred patient to schedulers for virtual visit.    Chey Aviles RN  [facility]            Review information with Patient    Your result was positive. This means you have COVID-19 (coronavirus).    How can I protect others?    These guidelines are for isolating before returning to work, school or .    If you DO have symptoms    Stay home and away from others     For at least 5 days after your symptoms started, AND    You are fever free for 24 hours (with no medicine that reduces fever), AND    Your other  symptoms are better    Wear a mask for 10 full days anytime you are around others    If you DON'T have symptoms    Stay home and away from others for at least 5 days after your positive test    Wear a mask for 10 full days anytime you are around others    There may be different guidelines for healthcare facilities.  Please check with the specific sites before arriving.    If you have been told by a doctor that you were severely ill with COVID-19 or are immunocompromised, you should isolate for at least 10 days.    You should not go back to work until you meet the guidelines above for ending your home isolation. You don't need to be retested for COVID-19 before going back to work--studies show that you won't spread the virus if it's been at least 10 days since your symptoms started (or 20 days, if you have a weak immune system).    Employers, schools, and daycares: This is an official notice for this person's medical guidelines for returning in-person.  They must meet the above guidelines before going back to work, school or  in person.    You will receive a positive COVID-19 letter via [employer] or the mail soon with additional self-care information.    Would you like me to review some of that information with you now?  No    If you were tested for an upcoming procedure, please contact your provider for next steps.    Chey Aviles RN        Reason for Disposition    [1] HIGH RISK for severe COVID complications (e.g., weak immune system, age > 64 years, obesity with BMI > 25, pregnant, chronic lung disease or other chronic medical condition) AND [2] COVID symptoms (e.g., cough, fever)  (Exceptions: Already seen by PCP and no new or worsening symptoms.)    Additional Information    Negative: SEVERE difficulty breathing (e.g., struggling for each breath, speaks in single words)    Negative: Difficult to awaken or acting confused (e.g., disoriented, slurred speech)    Negative: Bluish (or gray) lips or face  now    Negative: Shock suspected (e.g., cold/pale/clammy skin, too weak to stand, low BP, rapid pulse)    Negative: Sounds like a life-threatening emergency to the triager    Negative: SEVERE or constant chest pain or pressure  (Exception: Mild central chest pain, present only when coughing.)    Negative: MODERATE difficulty breathing (e.g., speaks in phrases, SOB even at rest, pulse 100-120)    Negative: [1] Headache AND [2] stiff neck (can't touch chin to chest)    Negative: Oxygen level (e.g., pulse oximetry) 90 percent or lower    Negative: Chest pain or pressure    Negative: Patient sounds very sick or weak to the triager    Negative: MILD difficulty breathing (e.g., minimal/no SOB at rest, SOB with walking, pulse <100)    Negative: Fever > 103 F (39.4 C)    Negative: [1] Fever > 101 F (38.3 C) AND [2] age > 60 years    Negative: [1] Fever > 100.0 F (37.8 C) AND [2] bedridden (e.g., nursing home patient, CVA, chronic illness, recovering from surgery)    Protocols used: CORONAVIRUS (COVID-19) DIAGNOSED OR [id number]

## 2023-04-10 NOTE — PROGRESS NOTES
"Assessment:    SARS-CoV-2 positive    Type 1 diabetes mellitus without complication (H)    COVID-19 positive patient.  Encounter for consideration of medication intervention.    Patient does qualify for a prescription.   Full discussion with patient including medication options, risks and benefits.   Potential drug interactions reviewed with patient.      Plan:   Treatment planned   Paxlovid will be sent in to preferred pharmacy.     Temporary change to home medications:   HOLD Lipitor while on this    Subjective      Franklin  is a 52 year old  who has a confirmed new positive COVID-19 diagnosis.      He has been identified as high risk for complications of this infection, and is being evaluated via a billable     Phone visit.      Phone call duration:7 minutes  Patient location: Home  Provider location: Clinic    Concern for COVID-19  When did symptoms begin? 4/10/23    Positive COVID test? Yes    Symptoms (Cough, trouble breathing, fever, chills, headache, sore throat, chest pain, diarrhea, body aches)?  Scratchy throat, nasal congestion, lose stool         Constitutional, HEENT, cardiovascular, pulmonary, gi and gu systems are negative, except as otherwise noted.    Objective    Vitals:  No vitals were obtained today due to virtual visit.  Estimated body mass index is 23.62 kg/m  as calculated from the following:    Height as of 6/2/22: 1.791 m (5' 10.5\").    Weight as of 2/2/23: 75.8 kg (167 lb).   General: Alert, no apparent distress  PSYCH: Alert; coherent speech, normal rate and volume, able to articulate logical thoughts, appropriate insight, no tangential thoughts, no hallucination or delusions.  Affect is appropriate  RESP: No cough, no audible wheezing, able to talk in full sentences  Remainder of exam unable to be completed due to telephone visit  GFR Estimate   Date Value Ref Range Status   07/26/2022 >90 >60 mL/min/1.73m2 Final     Comment:     Effective December 21, 2021 eGFRcr in adults is calculated " "using the 2021 CKD-EPI creatinine equation which includes age and gender (Michael et al., NEJM, DOI: 10.1056/WJPSnk7630838)   06/18/2021 >90 >60 mL/min/[1.73_m2] Final     Comment:     Non  GFR Calc  Starting 12/18/2018, serum creatinine based estimated GFR (eGFR) will be   calculated using the Chronic Kidney Disease Epidemiology Collaboration   (CKD-EPI) equation.                 The patient has been notified of following:     \"This telephone visit will be conducted via a call between you and your physician/provider. We have found that certain health care needs can be provided without the need for a physical exam.  This service lets us provide the care you need with a short phone conversation.  If a prescription is necessary we can send it directly to your pharmacy.  If lab work is needed we can place an order for that and you can then stop by our lab to have the test done at a later time.    Telephone visits are billed at different rates depending on your insurance coverage. During this emergency period, for some insurers they may be billed the same as an in-person visit.  Please reach out to your insurance provider with any questions.    If during the course of the call the physician/provider feels a telephone visit is not appropriate, you will not be charged for this service.\"    Patient has given verbal consent for Telephone visit?  Yes        "

## 2023-05-01 ENCOUNTER — TELEPHONE (OUTPATIENT)
Dept: ENDOCRINOLOGY | Facility: CLINIC | Age: 53
End: 2023-05-01
Payer: COMMERCIAL

## 2023-05-01 NOTE — TELEPHONE ENCOUNTER
ISAEL and sent mychart for patient to reschedule 2/1/24 appointment with Dr. Donahue as provider is unavailable.

## 2023-05-19 ENCOUNTER — TELEPHONE (OUTPATIENT)
Dept: ENDOCRINOLOGY | Facility: CLINIC | Age: 53
End: 2023-05-19
Payer: COMMERCIAL

## 2023-05-19 NOTE — TELEPHONE ENCOUNTER
Communication Summary: Spoke with patient and got appointment rescheduled    Appointment type: Return Diabetes  Provider: Dr. Donahue  Return date: 01/30/2024  Speciality phone number: [phone]  Additional appointment(s) needed: N/A  Additional notes: N/A    Blossom Macias on 5/19/2023 at 1:15 PM

## 2023-06-05 DIAGNOSIS — E10.9 WELL CONTROLLED TYPE 1 DIABETES MELLITUS (H): ICD-10-CM

## 2023-06-07 RX ORDER — PROCHLORPERAZINE 25 MG/1
SUPPOSITORY RECTAL
Qty: 9 EACH | Refills: 1 | Status: SHIPPED | OUTPATIENT
Start: 2023-06-07 | End: 2023-11-30

## 2023-07-09 ENCOUNTER — HEALTH MAINTENANCE LETTER (OUTPATIENT)
Age: 53
End: 2023-07-09

## 2023-07-31 ENCOUNTER — LAB (OUTPATIENT)
Dept: LAB | Facility: CLINIC | Age: 53
End: 2023-07-31
Payer: COMMERCIAL

## 2023-07-31 DIAGNOSIS — E10.9 TYPE 1 DIABETES MELLITUS WITHOUT COMPLICATION (H): ICD-10-CM

## 2023-07-31 LAB
CHOLEST SERPL-MCNC: 121 MG/DL
CREAT SERPL-MCNC: 0.91 MG/DL (ref 0.67–1.17)
CREAT UR-MCNC: 68.4 MG/DL
GFR SERPL CREATININE-BSD FRML MDRD: >90 ML/MIN/1.73M2
HDLC SERPL-MCNC: 51 MG/DL
LDLC SERPL CALC-MCNC: 65 MG/DL
MICROALBUMIN UR-MCNC: <12 MG/L
MICROALBUMIN/CREAT UR: NORMAL MG/G{CREAT}
NONHDLC SERPL-MCNC: 70 MG/DL
TRIGL SERPL-MCNC: 27 MG/DL
TSH SERPL DL<=0.005 MIU/L-ACNC: 2.18 UIU/ML (ref 0.3–4.2)
VIT B12 SERPL-MCNC: 289 PG/ML (ref 232–1245)

## 2023-07-31 PROCEDURE — 36415 COLL VENOUS BLD VENIPUNCTURE: CPT

## 2023-07-31 PROCEDURE — 83921 ORGANIC ACID SINGLE QUANT: CPT

## 2023-07-31 PROCEDURE — 82784 ASSAY IGA/IGD/IGG/IGM EACH: CPT

## 2023-07-31 PROCEDURE — 82565 ASSAY OF CREATININE: CPT

## 2023-07-31 PROCEDURE — 80061 LIPID PANEL: CPT

## 2023-07-31 PROCEDURE — 86364 TISS TRNSGLTMNASE EA IG CLAS: CPT

## 2023-07-31 PROCEDURE — 82570 ASSAY OF URINE CREATININE: CPT

## 2023-07-31 PROCEDURE — 82043 UR ALBUMIN QUANTITATIVE: CPT

## 2023-07-31 PROCEDURE — 84443 ASSAY THYROID STIM HORMONE: CPT

## 2023-07-31 PROCEDURE — 82607 VITAMIN B-12: CPT

## 2023-08-01 DIAGNOSIS — E78.2 MIXED HYPERLIPIDEMIA: ICD-10-CM

## 2023-08-01 LAB
IGA SERPL-MCNC: 166 MG/DL (ref 84–499)
TTG IGA SER-ACNC: 0.4 U/ML
TTG IGG SER-ACNC: 0.9 U/ML

## 2023-08-02 LAB — METHYLMALONATE SERPL-SCNC: 0.25 UMOL/L (ref 0–0.4)

## 2023-08-03 DIAGNOSIS — E10.9 TYPE 1 DIABETES MELLITUS WITHOUT COMPLICATION (H): ICD-10-CM

## 2023-08-03 RX ORDER — ATORVASTATIN CALCIUM 10 MG/1
10 TABLET, FILM COATED ORAL DAILY
Qty: 90 TABLET | Refills: 2 | Status: SHIPPED | OUTPATIENT
Start: 2023-08-03 | End: 2024-05-21

## 2023-08-03 RX ORDER — PEN NEEDLE, DIABETIC 31 GX3/16"
NEEDLE, DISPOSABLE MISCELLANEOUS
Qty: 500 EACH | Refills: 3 | Status: SHIPPED | OUTPATIENT
Start: 2023-08-03 | End: 2024-01-30

## 2023-08-11 NOTE — PROGRESS NOTES
"HPI:   Franklin is a 54 yo man here for follow up of type 1 diabetes, diagnosed in his 30's.  He also has been seen by Dr. Wilson for many years and recently established with Dr. Donahue. He reports that he has been doing well.  He is currently taking Lantus (tresiba not covered anymore by insurance) 9 units every PM and Humalog 1 unit/15 grams of CHO with meals and snacks, and for pre-meal correction @ 1/2 U per 30 mg/dL above 150 during the day and over 200 at HS. 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  He has been largely avoiding hypoglycemia and finds the dexcom helpful at preventing this.   He has never worn a pump.  He does have strong symptoms alerting him to hypoglycemia.  His brother also had type 1 diabetes and  of DKA.    Plantar fasciitis has resolved.      Franklin wears a Dexcom G6 sensor with overall average of 146 mg/dL (CV 30%) for the past two weeks. Recent glucose is as follows:                 Protein bar for breakfast, \"normal\" lunch, dinner, and sizeable snack (70g) around 1am.     Runs about 3 days a week. He continues running 5-6 miles at a time.       Franklin also has a history of Hashimoto's ab positivity with normal function, has never been on levothyroxine replacement therapy.  He has no palpitations. He generally has been feeling well and has no concerns today.     ROS   GENERAL: Weight stable. No fevers, chills, night sweats.      HEENT: no dysphagia, diplopia, neck pain or tenderness, dry/scratchy eyes, URI, cough, sinus drainage, tinnitus, sinus pressure  CV: no chest pain, pressure, palpitations, skipped beats, LOC  LUNGS: no SOB, FOUNTAIN, cough, sputum production, wheezing   GI: no diarrhea, constipation, abdominal pain  EXTREMITIES: +plantar fasciitis, improved. No rashes, ulcers, edema  NEUROLOGY: no changes in vision, tingling or numbness in hands or feet.   MSK: no muscle aches or pains, weakness  PSYCH: " mood stable    Past Medical History:   Diagnosis Date    Chronic lymphocytic thyroiditis 07/20/2011    Chronic lymphocytic thyroiditis 07/20/2011    Chronic lymphocytic thyroiditis     Depressive disorder 06/01/1997    Treated for a couple of years; not currently experiencing    Diabetes 06/23/2009       Past Surgical History:   Procedure Laterality Date    COLONOSCOPY WITH CO2 INSUFFLATION N/A 8/8/2022    Procedure: COLONOSCOPY, WITH CO2 INSUFFLATION;  Surgeon: Ashlyn Xavier MD;  Location: [facility] OR    HC TOOTH EXTRACTION W/FORCEP      VASECTOMY  01/2019       Family History   Problem Relation Age of Onset    Hyperlipidemia Mother     Diabetes Brother     Hypertension Paternal Grandfather     Cerebrovascular Disease Paternal Grandfather     Hypertension Maternal Grandfather     Hyperlipidemia Maternal Grandfather     Cerebrovascular Disease Maternal Grandfather     Breast Cancer Maternal Aunt     Diabetes Brother     Anxiety Disorder Brother     Coronary Artery Disease Other         Maternal great grandfather    Hypertension Maternal Grandmother     Hyperlipidemia Maternal Grandmother     Hyperlipidemia Brother     Cerebrovascular Disease Paternal Grandmother     Asthma No family hx of     C.A.D. No family hx of     Cancer - colorectal No family hx of     Prostate Cancer No family hx of        Social History     Social History    Marital status:      Spouse name: N/A    Number of children: N/A    Years of education: N/A     Social History Main Topics    Smoking status: Never Smoker    Smokeless tobacco: Never Used    Alcohol use Yes      Comment: ~ 1 drink/day    Drug use: No    Sexual activity: Yes     Partners: Female     Birth control/ protection: Condom, Natural Family Planning      Comment: Would like to talk about vasectomy     Other Topics Concern    Parent/Sibling W/ Cabg, Mi Or Angioplasty Before 65f 55m? No     Social History Narrative   Social Hx:   .  Has a son born in 2011.  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 Physically active- running and playing with his son. Runs.     Current Outpatient Medications   Medication    ACE/ARB NOT PRESCRIBED, INTENTIONAL,    acetone urine (KETOSTIX) test strip    alcohol swab prep pads    atorvastatin (LIPITOR) 10 MG tablet    bisacodyl (DULCOLAX) 5 MG EC tablet    blood glucose (ACCU-CHEK FASTCLIX) lancing device    blood glucose (NO BRAND SPECIFIED) test strip    blood glucose monitoring (ACCU-CHEK FASTCLIX) lancets    blood glucose monitoring (NO BRAND SPECIFIED) meter device kit    blood glucose monitoring (NO BRAND SPECIFIED) meter device kit    Continuous Blood Gluc Sensor (DEXCOM G6 SENSOR) MISC    Continuous Blood Gluc Transmit (DEXCOM G6 TRANSMITTER) MISC    Glucagon (BAQSIMI TWO PACK) 3 MG/DOSE POWD    glucagon (GLUCAGON EMERGENCY) 1 MG kit    insulin degludec (TRESIBA FLEXTOUCH) 100 UNIT/ML pen    insulin glargine (LANTUS PEN) 100 UNIT/ML pen    insulin lispro (HUMALOG CORDELIA KWIKPEN) 100 UNIT/ML (0.5 unit dial) KWIKPEN    insulin pen needle (PENTIPS) 31G X 5 MM miscellaneous    insulin pen needle (PENTIPS) 31G X 5 MM miscellaneous    loratadine (CLARITIN) 10 MG tablet    naproxen sodium 220 MG capsule    NOVOLOG PENFILL 100 UNIT/ML soln    polyethylene glycol (GOLYTELY) 236 g suspension    PRECISION XTRA KETONE STRP    Sodium Fluoride 1.1 % PSTE    VITAMIN D, CHOLECALCIFEROL, PO     No current facility-administered medications for this visit.          Allergies   Allergen Reactions    Seasonal Allergies     Ampicillin Rash    Ceftin Rash   Physical Exam  /72 (BP Location: Right arm, Patient Position: Sitting, Cuff Size: Adult Regular)   Pulse 62   Ht 1.778 m (5' 10\")   Wt 77.1 kg (170 lb)   SpO2 95%   BMI 24.39 kg/m    GENERAL:  Alert and oriented X3, NAD, well dressed, answering questions appropriately, appears stated age.  HEENT: OP clear, no lymphadenopathy, no thyromegaly, non-tender, no " exophthalmus, no proptosis, EOMI, no lid lag, no retraction  EXTREMITIES: no edema, +pulses, no rashes, no lesions.  +onychomycosis toenails left foot.   NEUROLOGY: + monofilament, +vibratory sensation, + DTR upper and lower extremity    RESULTS  Lab Results   Component Value Date    A1C 6.6 (H) 07/26/2022    A1C 6.4 (H) 06/18/2021    A1C 6.8 (H) 07/03/2020    A1C 8.4 (H) 03/13/2017    A1C 7.6 (H) 02/18/2016    A1C 7.8 (H) 07/07/2015    HEMOGLOBINA1 6.9 08/14/2023    HEMOGLOBINA1 6.6 02/02/2023    HEMOGLOBINA1 6.4 01/17/2022    HEMOGLOBINA1 6.9 (A) 03/10/2020    HEMOGLOBINA1 6.8 (A) 09/10/2019       TSH   Date Value Ref Range Status   07/31/2023 2.18 0.30 - 4.20 uIU/mL Final   07/26/2022 2.02 0.40 - 4.00 mU/L Final   06/18/2021 2.39 0.40 - 4.00 mU/L Final   07/03/2020 2.72 0.40 - 4.00 mU/L Final   07/03/2020 2.72 0.40 - 4.00 mU/L Final   05/24/2019 1.89 0.40 - 4.00 mU/L Final   04/18/2018 2.20 0.40 - 4.00 mU/L Final     T4 Total   Date Value Ref Range Status   09/28/2010 9.0 5.0 - 11.0 ug/dL Final     T4 Free   Date Value Ref Range Status   02/18/2016 1.06 0.76 - 1.46 ng/dL Final   07/07/2015 1.04 0.76 - 1.46 ng/dL Final   01/05/2015 1.02 0.76 - 1.46 ng/dL Final     Comment:     Effective 7/30/2014, the reference range for this assay has changed to reflect   new instrumentation/methodology.     12/03/2013 1.08 0.70 - 1.85 ng/dL Final   01/26/2011 1.10 0.70 - 1.85 ng/dL Final       ALT   Date Value Ref Range Status   05/24/2019 25 0 - 70 U/L Final   03/13/2017 24 0 - 70 U/L Final   ]    Recent Labs   Lab Test 07/31/23  1021 07/26/22  0929   CHOL 121 106   HDL 51 49   LDL 65 47   TRIG 27 52       Lab Results   Component Value Date     07/26/2022     03/13/2017      Lab Results   Component Value Date    POTASSIUM 4.3 07/26/2022    POTASSIUM 3.9 06/18/2021     Lab Results   Component Value Date    CHLORIDE 105 07/26/2022    CHLORIDE 102 03/13/2017     Lab Results   Component Value Date    SHANIKA 9.1 07/26/2022     SHANIKA 9.1 03/13/2017     Lab Results   Component Value Date    CO2 29 07/26/2022    CO2 31 03/13/2017     Lab Results   Component Value Date    BUN 16 07/26/2022    BUN 15 03/13/2017     Lab Results   Component Value Date    CR 0.91 07/31/2023    CR 0.85 06/18/2021       GFR Estimate   Date Value Ref Range Status   07/31/2023 >90 >60 mL/min/1.73m2 Final   07/26/2022 >90 >60 mL/min/1.73m2 Final     Comment:     Effective December 21, 2021 eGFRcr in adults is calculated using the 2021 CKD-EPI creatinine equation which includes age and gender (Michael et al., NEJ, DOI: 10.1056/TOTMgm2275028)   06/18/2021 >90 >60 mL/min/[1.73_m2] Final     Comment:     Non  GFR Calc  Starting 12/18/2018, serum creatinine based estimated GFR (eGFR) will be   calculated using the Chronic Kidney Disease Epidemiology Collaboration   (CKD-EPI) equation.     07/03/2020 >90 >60 mL/min/[1.73_m2] Final     Comment:     Non  GFR Calc  Starting 12/18/2018, serum creatinine based estimated GFR (eGFR) will be   calculated using the Chronic Kidney Disease Epidemiology Collaboration   (CKD-EPI) equation.     05/24/2019 >90 >60 mL/min/[1.73_m2] Final     Comment:     Non  GFR Calc  Starting 12/18/2018, serum creatinine based estimated GFR (eGFR) will be   calculated using the Chronic Kidney Disease Epidemiology Collaboration   (CKD-EPI) equation.       GFR Estimate If Black   Date Value Ref Range Status   06/18/2021 >90 >60 mL/min/[1.73_m2] Final     Comment:      GFR Calc  Starting 12/18/2018, serum creatinine based estimated GFR (eGFR) will be   calculated using the Chronic Kidney Disease Epidemiology Collaboration   (CKD-EPI) equation.     07/03/2020 >90 >60 mL/min/[1.73_m2] Final     Comment:      GFR Calc  Starting 12/18/2018, serum creatinine based estimated GFR (eGFR) will be   calculated using the Chronic Kidney Disease Epidemiology Collaboration   (CKD-EPI)  "equation.     05/24/2019 >90 >60 mL/min/[1.73_m2] Final     Comment:      GFR Calc  Starting 12/18/2018, serum creatinine based estimated GFR (eGFR) will be   calculated using the Chronic Kidney Disease Epidemiology Collaboration   (CKD-EPI) equation.         Lab Results   Component Value Date    MICROL <12.0 07/31/2023    MICROL <5 07/26/2022    MICROL <5 06/18/2021     No results found for: MICROALBUMIN  Lab Results   Component Value Date    CPEPT 2.3 11/19/2009    GADAB >250.0 (H) 11/19/2009    ISCAB  09/08/2009     1:16  Reference range: <1:4  (Note)  TEST INFORMATION: Islet Cell Ab, IgG  Islet cell antibodies have been associated with  \"autoimmune\" endocrine disorders and insulin-dependent  diabetes. This disorder is characterized by the presence  of antibodies in patients that may be detected years  before the onset of the clinical symptoms. To calculate  Juvenile Diabetes Foundation (JDF) units: multiply the  titer x 5 (1:8  8 x 5 = 40 JDF Units).  Performed by [facility],  [address] [place] [id number] [phone]  [email or web address], Evelia Miles MD, Lab. Director       Vitamin B12   Date Value Ref Range Status   07/31/2023 289 232 - 1,245 pg/mL Final   ]    Most recent eye exam date: : Not Found     Eye exam in January, 2022- no retinopathy.      Assessment/Plan:     1.  Type 1 diabetes- Franklin's glucose control is excellent. A1c is 6.9%.  His variability is low with very little hypoglycemia. We have discussed the In-pen and pumps in the past, but he feels he is doing fine on his current treatment plan.  No other changes today.      2.  Risk factors-     Retinopathy:  No.  Had eye exam in January, 2023.    Nephropathy:  BP historically well controlled. No microalbuminuria.  Creatinine stable.   Neuropathy: No.    Feet: OK, no ulcers. No swelling in joints.    Lipids:  LDL at target.  Patient taking statin.  Celiac screening: negative 2018    3.  Positive TPO antibodies-TSH " normal on no therapy.     4. F/U in 6 months with Dr. Donahue, in 1 year with me.     26 minutes spent on the date of the encounter doing chart review, review of test results, review of continuous glucose sensor, interpretation of glucose data, patient visit and documentation, counseling/coordination of care, and discussion of follow up plan for worsening hyper and hypoglycemia.  The patient understood and is satisfied with today's visit.        Liz Conroy PA-C, MPAS   [facility]  Department of Medicine  Division of Endocrinology and Diabetes    Answers for HPI/ROS submitted by the patient on 8/13/2023  General Symptoms: No  Skin Symptoms: No  HENT Symptoms: No  EYE SYMPTOMS: No  HEART SYMPTOMS: No  LUNG SYMPTOMS: No  INTESTINAL SYMPTOMS: No  URINARY SYMPTOMS: No  REPRODUCTIVE SYMPTOMS: No  SKELETAL SYMPTOMS: No  BLOOD SYMPTOMS: No  NERVOUS SYSTEM SYMPTOMS: No  MENTAL HEALTH SYMPTOMS: No

## 2023-08-14 ENCOUNTER — OFFICE VISIT (OUTPATIENT)
Dept: ENDOCRINOLOGY | Facility: CLINIC | Age: 53
End: 2023-08-14
Payer: COMMERCIAL

## 2023-08-14 VITALS
DIASTOLIC BLOOD PRESSURE: 72 MMHG | WEIGHT: 170 LBS | SYSTOLIC BLOOD PRESSURE: 111 MMHG | OXYGEN SATURATION: 95 % | HEIGHT: 70 IN | HEART RATE: 62 BPM | BODY MASS INDEX: 24.34 KG/M2

## 2023-08-14 DIAGNOSIS — E10.9 TYPE 1 DIABETES MELLITUS WITHOUT COMPLICATION (H): Primary | ICD-10-CM

## 2023-08-14 LAB — HBA1C MFR BLD: 6.9 % (ref 4.3–?)

## 2023-08-14 PROCEDURE — 83036 HEMOGLOBIN GLYCOSYLATED A1C: CPT | Performed by: PHYSICIAN ASSISTANT

## 2023-08-14 PROCEDURE — 99213 OFFICE O/P EST LOW 20 MIN: CPT | Performed by: PHYSICIAN ASSISTANT

## 2023-08-14 ASSESSMENT — PAIN SCALES - GENERAL: PAINLEVEL: NO PAIN (0)

## 2023-08-14 NOTE — PATIENT INSTRUCTIONS
Keep up the great work!     Please let me know if you are having low blood sugars less than 70 or over 250 mg/dL.      If you have concerns, please send me a [employer] message M-Th, call the clinic at [phone], or call [phone] after hours/weekends and ask to speak with the endocrinologist on call.

## 2023-08-14 NOTE — NURSING NOTE
"Chief Complaint   Patient presents with    Diabetes     Vital signs:      BP: 111/72 Pulse: 62     SpO2: 95 %     Height: 177.8 cm (5' 10\") Weight: 77.1 kg (170 lb)  Estimated body mass index is 24.39 kg/m  as calculated from the following:    Height as of this encounter: 1.778 m (5' 10\").    Weight as of this encounter: 77.1 kg (170 lb).        "

## 2023-08-14 NOTE — LETTER
"2023       RE: Franklin Muir  [address]  [place] [address]     Dear Colleague,    Thank you for referring your patient, Franklin Muir, to the [facility] [place] [facility]. Please see a copy of my visit note below.    HPI:   Franklin is a 54 yo man here for follow up of type 1 diabetes, diagnosed in his 30's.  He also has been seen by Dr. Wilson for many years and recently established with Dr. Donahue. He reports that he has been doing well.  He is currently taking Lantus (tresiba not covered anymore by insurance) 9 units every PM and Humalog 1 unit/15 grams of CHO with meals and snacks, and for pre-meal correction @ 1/2 U per 30 mg/dL above 150 during the day and over 200 at HS. 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  He has been largely avoiding hypoglycemia and finds the dexcom helpful at preventing this.   He has never worn a pump.  He does have strong symptoms alerting him to hypoglycemia.  His brother also had type 1 diabetes and  of DKA.    Plantar fasciitis has resolved.      Franklin wears a Dexcom G6 sensor with overall average of 146 mg/dL (CV 30%) for the past two weeks. Recent glucose is as follows:                 Protein bar for breakfast, \"normal\" lunch, dinner, and sizeable snack (70g) around 1am.     Runs about 3 days a week. He continues running 5-6 miles at a time.       Franklin also has a history of Hashimoto's ab positivity with normal function, has never been on levothyroxine replacement therapy.  He has no palpitations. He generally has been feeling well and has no concerns today.     ROS   GENERAL: Weight stable. No fevers, chills, night sweats.      HEENT: no dysphagia, diplopia, neck pain or tenderness, dry/scratchy eyes, URI, cough, sinus drainage, tinnitus, sinus pressure  CV: no chest pain, pressure, " palpitations, skipped beats, LOC  LUNGS: no SOB, FOUNTAIN, cough, sputum production, wheezing   GI: no diarrhea, constipation, abdominal pain  EXTREMITIES: +plantar fasciitis, improved. No rashes, ulcers, edema  NEUROLOGY: no changes in vision, tingling or numbness in hands or feet.   MSK: no muscle aches or pains, weakness  PSYCH: mood stable    Past Medical History:   Diagnosis Date    Chronic lymphocytic thyroiditis 07/20/2011    Chronic lymphocytic thyroiditis 07/20/2011    Chronic lymphocytic thyroiditis     Depressive disorder 06/01/1997    Treated for a couple of years; not currently experiencing    Diabetes 06/23/2009       Past Surgical History:   Procedure Laterality Date    COLONOSCOPY WITH CO2 INSUFFLATION N/A 8/8/2022    Procedure: COLONOSCOPY, WITH CO2 INSUFFLATION;  Surgeon: Ashlyn Xavier MD;  Location: [facility] OR    HC TOOTH EXTRACTION W/FORCEP      VASECTOMY  01/2019       Family History   Problem Relation Age of Onset    Hyperlipidemia Mother     Diabetes Brother     Hypertension Paternal Grandfather     Cerebrovascular Disease Paternal Grandfather     Hypertension Maternal Grandfather     Hyperlipidemia Maternal Grandfather     Cerebrovascular Disease Maternal Grandfather     Breast Cancer Maternal Aunt     Diabetes Brother     Anxiety Disorder Brother     Coronary Artery Disease Other         Maternal great grandfather    Hypertension Maternal Grandmother     Hyperlipidemia Maternal Grandmother     Hyperlipidemia Brother     Cerebrovascular Disease Paternal Grandmother     Asthma No family hx of     C.A.D. No family hx of     Cancer - colorectal No family hx of     Prostate Cancer No family hx of        Social History     Social History    Marital status:      Spouse name: N/A    Number of children: N/A    Years of education: N/A     Social History Main Topics    Smoking status: Never Smoker    Smokeless tobacco: Never Used    Alcohol use Yes      Comment: ~ 1 drink/day    Drug use: No     Sexual activity: Yes     Partners: Female     Birth control/ protection: Condom, Natural Family Planning      Comment: Would like to talk about vasectomy     Other Topics Concern    Parent/Sibling W/ Cabg, Mi Or Angioplasty Before 65f 55m? No     Social History Narrative   Social Hx:   .  Has a son born in 2011.   at the [employer]. Now . Physically active- running and playing with his son. Runs.     Current Outpatient Medications   Medication    ACE/ARB NOT PRESCRIBED, INTENTIONAL,    acetone urine (KETOSTIX) test strip    alcohol swab prep pads    atorvastatin (LIPITOR) 10 MG tablet    bisacodyl (DULCOLAX) 5 MG EC tablet    blood glucose (ACCU-CHEK FASTCLIX) lancing device    blood glucose (NO BRAND SPECIFIED) test strip    blood glucose monitoring (ACCU-CHEK FASTCLIX) lancets    blood glucose monitoring (NO BRAND SPECIFIED) meter device kit    blood glucose monitoring (NO BRAND SPECIFIED) meter device kit    Continuous Blood Gluc Sensor (DEXCOM G6 SENSOR) MISC    Continuous Blood Gluc Transmit (DEXCOM G6 TRANSMITTER) MISC    Glucagon (BAQSIMI TWO PACK) 3 MG/DOSE POWD    glucagon (GLUCAGON EMERGENCY) 1 MG kit    insulin degludec (TRESIBA FLEXTOUCH) 100 UNIT/ML pen    insulin glargine (LANTUS PEN) 100 UNIT/ML pen    insulin lispro (HUMALOG CORDELIA KWIKPEN) 100 UNIT/ML (0.5 unit dial) KWIKPEN    insulin pen needle (PENTIPS) 31G X 5 MM miscellaneous    insulin pen needle (PENTIPS) 31G X 5 MM miscellaneous    loratadine (CLARITIN) 10 MG tablet    naproxen sodium 220 MG capsule    NOVOLOG PENFILL 100 UNIT/ML soln    polyethylene glycol (GOLYTELY) 236 g suspension    PRECISION XTRA KETONE STRP    Sodium Fluoride 1.1 % PSTE    VITAMIN D, CHOLECALCIFEROL, PO     No current facility-administered medications for this visit.          Allergies   Allergen Reactions    Seasonal Allergies     Ampicillin Rash    Ceftin Rash   Physical Exam  /72 (BP Location: Right arm,  "Patient Position: Sitting, Cuff Size: Adult Regular)   Pulse 62   Ht 1.778 m (5' 10\")   Wt 77.1 kg (170 lb)   SpO2 95%   BMI 24.39 kg/m    GENERAL:  Alert and oriented X3, NAD, well dressed, answering questions appropriately, appears stated age.  HEENT: OP clear, no lymphadenopathy, no thyromegaly, non-tender, no exophthalmus, no proptosis, EOMI, no lid lag, no retraction  EXTREMITIES: no edema, +pulses, no rashes, no lesions.  +onychomycosis toenails left foot.   NEUROLOGY: + monofilament, +vibratory sensation, + DTR upper and lower extremity    RESULTS  Lab Results   Component Value Date    A1C 6.6 (H) 07/26/2022    A1C 6.4 (H) 06/18/2021    A1C 6.8 (H) 07/03/2020    A1C 8.4 (H) 03/13/2017    A1C 7.6 (H) 02/18/2016    A1C 7.8 (H) 07/07/2015    HEMOGLOBINA1 6.9 08/14/2023    HEMOGLOBINA1 6.6 02/02/2023    HEMOGLOBINA1 6.4 01/17/2022    HEMOGLOBINA1 6.9 (A) 03/10/2020    HEMOGLOBINA1 6.8 (A) 09/10/2019       TSH   Date Value Ref Range Status   07/31/2023 2.18 0.30 - 4.20 uIU/mL Final   07/26/2022 2.02 0.40 - 4.00 mU/L Final   06/18/2021 2.39 0.40 - 4.00 mU/L Final   07/03/2020 2.72 0.40 - 4.00 mU/L Final   07/03/2020 2.72 0.40 - 4.00 mU/L Final   05/24/2019 1.89 0.40 - 4.00 mU/L Final   04/18/2018 2.20 0.40 - 4.00 mU/L Final     T4 Total   Date Value Ref Range Status   09/28/2010 9.0 5.0 - 11.0 ug/dL Final     T4 Free   Date Value Ref Range Status   02/18/2016 1.06 0.76 - 1.46 ng/dL Final   07/07/2015 1.04 0.76 - 1.46 ng/dL Final   01/05/2015 1.02 0.76 - 1.46 ng/dL Final     Comment:     Effective 7/30/2014, the reference range for this assay has changed to reflect   new instrumentation/methodology.     12/03/2013 1.08 0.70 - 1.85 ng/dL Final   01/26/2011 1.10 0.70 - 1.85 ng/dL Final       ALT   Date Value Ref Range Status   05/24/2019 25 0 - 70 U/L Final   03/13/2017 24 0 - 70 U/L Final   ]    Recent Labs   Lab Test 07/31/23  1021 07/26/22  0929   CHOL 121 106   HDL 51 49   LDL 65 47   TRIG 27 52       Lab " Results   Component Value Date     07/26/2022     03/13/2017      Lab Results   Component Value Date    POTASSIUM 4.3 07/26/2022    POTASSIUM 3.9 06/18/2021     Lab Results   Component Value Date    CHLORIDE 105 07/26/2022    CHLORIDE 102 03/13/2017     Lab Results   Component Value Date    SHANIKA 9.1 07/26/2022    SHANIKA 9.1 03/13/2017     Lab Results   Component Value Date    CO2 29 07/26/2022    CO2 31 03/13/2017     Lab Results   Component Value Date    BUN 16 07/26/2022    BUN 15 03/13/2017     Lab Results   Component Value Date    CR 0.91 07/31/2023    CR 0.85 06/18/2021       GFR Estimate   Date Value Ref Range Status   07/31/2023 >90 >60 mL/min/1.73m2 Final   07/26/2022 >90 >60 mL/min/1.73m2 Final     Comment:     Effective December 21, 2021 eGFRcr in adults is calculated using the 2021 CKD-EPI creatinine equation which includes age and gender (Michael valle al., [place], DOI: 10.1056/WVUBxq6678185)   06/18/2021 >90 >60 mL/min/[1.73_m2] Final     Comment:     Non  GFR Calc  Starting 12/18/2018, serum creatinine based estimated GFR (eGFR) will be   calculated using the Chronic Kidney Disease Epidemiology Collaboration   (CKD-EPI) equation.     07/03/2020 >90 >60 mL/min/[1.73_m2] Final     Comment:     Non  GFR Calc  Starting 12/18/2018, serum creatinine based estimated GFR (eGFR) will be   calculated using the Chronic Kidney Disease Epidemiology Collaboration   (CKD-EPI) equation.     05/24/2019 >90 >60 mL/min/[1.73_m2] Final     Comment:     Non  GFR Calc  Starting 12/18/2018, serum creatinine based estimated GFR (eGFR) will be   calculated using the Chronic Kidney Disease Epidemiology Collaboration   (CKD-EPI) equation.       GFR Estimate If Black   Date Value Ref Range Status   06/18/2021 >90 >60 mL/min/[1.73_m2] Final     Comment:      GFR Calc  Starting 12/18/2018, serum creatinine based estimated GFR (eGFR) will be   calculated using the  "Chronic Kidney Disease Epidemiology Collaboration   (CKD-EPI) equation.     07/03/2020 >90 >60 mL/min/[1.73_m2] Final     Comment:      GFR Calc  Starting 12/18/2018, serum creatinine based estimated GFR (eGFR) will be   calculated using the Chronic Kidney Disease Epidemiology Collaboration   (CKD-EPI) equation.     05/24/2019 >90 >60 mL/min/[1.73_m2] Final     Comment:      GFR Calc  Starting 12/18/2018, serum creatinine based estimated GFR (eGFR) will be   calculated using the Chronic Kidney Disease Epidemiology Collaboration   (CKD-EPI) equation.         Lab Results   Component Value Date    MICROL <12.0 07/31/2023    MICROL <5 07/26/2022    MICROL <5 06/18/2021     No results found for: MICROALBUMIN  Lab Results   Component Value Date    CPEPT 2.3 11/19/2009    GADAB >250.0 (H) 11/19/2009    ISCAB  09/08/2009     1:16  Reference range: <1:4  (Note)  TEST INFORMATION: Islet Cell Ab, IgG  Islet cell antibodies have been associated with  \"autoimmune\" endocrine disorders and insulin-dependent  diabetes. This disorder is characterized by the presence  of antibodies in patients that may be detected years  before the onset of the clinical symptoms. To calculate  Juvenile Diabetes Foundation (JDF) units: multiply the  titer x 5 (1:8  8 x 5 = 40 JDF Units).  Performed by [facility],  [address] [place] [id number] [phone]  [email or web address], Evelia Miles MD, Lab. Director       Vitamin B12   Date Value Ref Range Status   07/31/2023 289 232 - 1,245 pg/mL Final   ]    Most recent eye exam date: : Not Found     Eye exam in January, 2022- no retinopathy.      Assessment/Plan:     1.  Type 1 diabetes- Franklin's glucose control is excellent. A1c is 6.9%.  His variability is low with very little hypoglycemia. We have discussed the In-pen and pumps in the past, but he feels he is doing fine on his current treatment plan.  No other changes today.      2.  Risk factors- "     Retinopathy:  No.  Had eye exam in January, 2023.    Nephropathy:  BP historically well controlled. No microalbuminuria.  Creatinine stable.   Neuropathy: No.    Feet: OK, no ulcers. No swelling in joints.    Lipids:  LDL at target.  Patient taking statin.  Celiac screening: negative 2018    3.  Positive TPO antibodies-TSH normal on no therapy.     4. F/U in 6 months with Dr. Donahue, in 1 year with me.     26 minutes spent on the date of the encounter doing chart review, review of test results, review of continuous glucose sensor, interpretation of glucose data, patient visit and documentation, counseling/coordination of care, and discussion of follow up plan for worsening hyper and hypoglycemia.  The patient understood and is satisfied with today's visit.        Liz Conroy PA-C, MPAS   [facility]  Department of Medicine  Division of Endocrinology and Diabetes    Answers for HPI/ROS submitted by the patient on 8/13/2023  General Symptoms: No  Skin Symptoms: No  HENT Symptoms: No  EYE SYMPTOMS: No  HEART SYMPTOMS: No  LUNG SYMPTOMS: No  INTESTINAL SYMPTOMS: No  URINARY SYMPTOMS: No  REPRODUCTIVE SYMPTOMS: No  SKELETAL SYMPTOMS: No  BLOOD SYMPTOMS: No  NERVOUS SYSTEM SYMPTOMS: No  MENTAL HEALTH SYMPTOMS: No

## 2023-11-28 DIAGNOSIS — E10.9 WELL CONTROLLED TYPE 1 DIABETES MELLITUS (H): ICD-10-CM

## 2023-11-30 RX ORDER — PROCHLORPERAZINE 25 MG/1
SUPPOSITORY RECTAL
Qty: 9 EACH | Refills: 3 | Status: SHIPPED | OUTPATIENT
Start: 2023-11-30 | End: 2024-07-15

## 2023-12-29 ENCOUNTER — NURSE TRIAGE (OUTPATIENT)
Dept: NURSING | Facility: CLINIC | Age: 53
End: 2023-12-29
Payer: COMMERCIAL

## 2023-12-30 ENCOUNTER — VIRTUAL VISIT (OUTPATIENT)
Dept: URGENT CARE | Facility: CLINIC | Age: 53
End: 2023-12-30
Payer: COMMERCIAL

## 2023-12-30 DIAGNOSIS — U07.1 COVID-19: Primary | ICD-10-CM

## 2023-12-30 PROCEDURE — 99213 OFFICE O/P EST LOW 20 MIN: CPT | Mod: VID

## 2023-12-30 NOTE — TELEPHONE ENCOUNTER
Patient tested positive for Covid at home tonight 12-29-23.  Reporting cough, stuffy nose, denies fever, chest pain, dyspnea.  Symptoms started 12-27-23.    Patient would like a prescription for Paxlovid. Patient has no PCP clinic listed and he said his provider retired and he doesn't really have another yet.  Advised patient he can get a virtual appointment, and patient said he already has one for tomorrow morning.  Patient denies need for education of care advise and quarantining as he has access to [facility] information.    Chey Aviles RN  [facility]       Reason for Disposition   [1] HIGH RISK patient (e.g., weak immune system, age > 64 years, obesity with BMI 30 or higher, pregnant, chronic lung disease or other chronic medical condition) AND [2] COVID symptoms (e.g., cough, fever)  (Exceptions: Already seen by PCP and no new or worsening symptoms.)    Additional Information   Negative: SEVERE difficulty breathing (e.g., struggling for each breath, speaks in single words)   Negative: Difficult to awaken or acting confused (e.g., disoriented, slurred speech)   Negative: Bluish (or gray) lips or face now   Negative: Shock suspected (e.g., cold/pale/clammy skin, too weak to stand, low BP, rapid pulse)   Negative: Sounds like a life-threatening emergency to the triager   Negative: SEVERE or constant chest pain or pressure  (Exception: Mild central chest pain, present only when coughing.)   Negative: MODERATE difficulty breathing (e.g., speaks in phrases, SOB even at rest, pulse 100-120)   Negative: [1] Headache AND [2] stiff neck (can't touch chin to chest)   Negative: Oxygen level (e.g., pulse oximetry) 90 percent or lower   Negative: Chest pain or pressure  (Exception: MILD central chest pain, present only when coughing.)   Negative: [1] Drinking very little AND [2] dehydration suspected (e.g., no urine > 12 hours, very dry mouth, very lightheaded)   Negative: Patient sounds very sick or weak to the  triager   Negative: MILD difficulty breathing (e.g., minimal/no SOB at rest, SOB with walking, pulse <100)   Negative: Fever > 103 F (39.4 C)   Negative: [1] Fever > 101 F (38.3 C) AND [2] age > 60 years   Negative: [1] Fever > 100.0 F (37.8 C) AND [2] bedridden (e.g., CVA, chronic illness, recovering from surgery)   Negative: Oxygen level (e.g., pulse oximetry) 91 to 94 percent    Protocols used: Coronavirus (COVID-19) Diagnosed or [id number]-F-XC

## 2023-12-30 NOTE — PROGRESS NOTES
Amos is a 53 year old who is being evaluated via a billable video visit.      How would you like to obtain your AVS? MyChart  If the video visit is dropped, the invitation should be resent by: Text to cell phone: [phone]  Will anyone else be joining your video visit? No          Assessment & Plan     (U07.1) COVID-19  (primary encounter diagnosis)    Rx paxlovid  Hold statin    [facility]  Virtual Urgent Care  [facility] VIRTUAL URGENT CARE    Subjective   Amos is a 53 year old, presenting for the following health issues:  COVID TX    HPI     Covid last night  Sx started with cough congestion 3-4 days ago  No sob wheezing chest pain  Son also has covid  Hydrated   Tx: nyquil       Objective           Vitals:  No vitals were obtained today due to virtual visit.    Physical Exam   GENERAL: Healthy, alert and no distress  EYES: Eyes grossly normal to inspection.  No discharge or erythema, or obvious scleral/conjunctival abnormalities.  RESP: No audible wheeze, cough, or visible cyanosis.  No visible retractions or increased work of breathing.    SKIN: Visible skin clear. No significant rash, abnormal pigmentation or lesions.  NEURO: Cranial nerves grossly intact.  Mentation and speech appropriate for age.  PSYCH: Mentation appears normal, affect normal/bright, judgement and insight intact, normal speech and appearance well-groomed.        Video-Visit Details    Type of service:  Video Visit   Time started: 11:30 am  Time ended: 11:55 am  Originating Location (pt. Location): Home    Distant Location (provider location):  Off-site  Platform used for Video Visit: [employer]

## 2024-01-11 ENCOUNTER — TRANSFERRED RECORDS (OUTPATIENT)
Dept: HEALTH INFORMATION MANAGEMENT | Facility: CLINIC | Age: 54
End: 2024-01-11
Payer: COMMERCIAL

## 2024-01-29 ASSESSMENT — ENCOUNTER SYMPTOMS
EYE REDNESS: 0
EYE WATERING: 1
DOUBLE VISION: 0
EYE PAIN: 0
EYE IRRITATION: 1

## 2024-01-29 NOTE — PROGRESS NOTES
Subjective:    Established patient, he previously saw Dr. Wilson (most recently 2021) and he follows with Liz HARRIS) with most recent appointment 2023.    Franklin Muir is a 53 year old male who presents for DM-1. 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    Diagnosed with DM: diagnosed in  and the diagnosis was incidentally on screening labs - insulin was first started a few years/month after time of diagnosis    History of DKA/HHS: no    FH of DM: brother with T1DM -  at age 26 from DKA    Glycemic control over the years: has been outstanding, most recently HbA1c 6.9% 2023 (stable)     Current DM therapy:  -Lantus 0-0-0-10 (Tresiba no longer covered by insurance)   -NovoLog ICR 1:15, CS: 1/2 unit per 30 mg/dL >150 mg/dL during the day and 1/2 unit per 60 mg/dL >150 mg/dL at bedtime     Prior DM therapy:  -No prior use of insulin pump therapy     Current glycemic control: CGM reviewed     Diet: 3 meals/day, snacks at night     Physical activity: he runs a few times per week     Tobacco/alcohol use: no tobacco use, 1 beer at night     Complications noted in the A/P section.     Objective:    BMI 25.3 kg/m2, /68. Appears well. Thyroid examined and normal.  No cervical lymphadenopathy.     2023: BMI 23.6 kg/m2, blood pressure 131/82. Appears well.  Thyroid examined and normal.  No cervical lymphadenopathy.  No lipohypertrophy at his abdominal insulin injection sites.  Diabetic foot exam performed and normal.    Assessment/Plan:    # DM-  -: DEE Ab >250 (ULN 5.0 international unit(s)/mL)   -2009: C-peptide 2.3 ng/mL with concurrent glucose 84 mg/dL  -He has glucagon available PRN   # No retinopathy, DM eye exam 2024  # No hypertension, no nephropathy, not on an ACE-I/ARB    -2023: GFR >90, urine microalbumin undetectable   # No peripheral neuropathy, no prior foot ulceration   # No prior ASCVD event, not on ASA, on atorvastatin 10 mg daily     -2023: , TG 27, HDL 51,  LDL 65  # Hashimoto's thyroiditis   -TSH and free T4 always normal previously, most recently 7/2023 TSH 2.2  -2009: elevated TPO Ab   -No prior use of thyroid hormone   # Other  -7/2023: B12/MMA normal, Celiac screen normal      CGM reviewed in detail. GMI 7.0%.  No hypoglycemia. Only 2% very high. Time in range 68% but range is  mg/deciliter overnight on his CGM.    Amos is doing very well.  The only change I suggest is adding an extra half unit of NovoLog before meals.    Amos will be taking a 6-month sabbatical in [facility] and tentatively leaving August 2024 and returning in early 2025.  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    We will have him see Liz Conroy PA-C, in the summer 2024 and I ordered labs to be done before hand.  Return to see me in early 2025.  We did review insulin pump therapy today and we will consider this more when he returns from [facility].  Because he is doing so well there is no rush there but I do think he would ultimately benefit from using an insulin pump.    20 minutes spent on the date of the encounter doing chart review, history and exam, documentation and further activities as noted above.  This did not include time spent on CGM review. The longitudinal plan of care for Amos was addressed during this visit. Due to the added complexity in care, I will continue to support Amos in the subsequent management of DM and with the ongoing continuity of care of this condition.

## 2024-01-30 ENCOUNTER — OFFICE VISIT (OUTPATIENT)
Dept: ENDOCRINOLOGY | Facility: CLINIC | Age: 54
End: 2024-01-30
Payer: COMMERCIAL

## 2024-01-30 VITALS
BODY MASS INDEX: 25.25 KG/M2 | DIASTOLIC BLOOD PRESSURE: 68 MMHG | SYSTOLIC BLOOD PRESSURE: 116 MMHG | HEART RATE: 68 BPM | WEIGHT: 176 LBS

## 2024-01-30 DIAGNOSIS — E78.2 MIXED HYPERLIPIDEMIA: ICD-10-CM

## 2024-01-30 DIAGNOSIS — E10.9 TYPE 1 DIABETES MELLITUS WITHOUT COMPLICATION (H): Primary | ICD-10-CM

## 2024-01-30 DIAGNOSIS — Z79.4 LONG TERM (CURRENT) USE OF INSULIN (H): ICD-10-CM

## 2024-01-30 DIAGNOSIS — E06.3 HASHIMOTO'S THYROIDITIS: ICD-10-CM

## 2024-01-30 PROCEDURE — 99213 OFFICE O/P EST LOW 20 MIN: CPT | Mod: 25 | Performed by: INTERNAL MEDICINE

## 2024-01-30 PROCEDURE — 95251 CONT GLUC MNTR ANALYSIS I&R: CPT | Performed by: INTERNAL MEDICINE

## 2024-01-30 NOTE — LETTER
2024         RE: Franklin Muir  [address]  [place] [address]        Dear Colleague,    Thank you for referring your patient, Franklin Muir, to the [facility]. Please see a copy of my visit note below.    Subjective:    Established patient, he previously saw Dr. Wilson (most recently 2021) and he follows with Liz Conroy ([facility]) with most recent appointment 2023.    Franklin Muir is a 53 year old male who presents for DM-1. 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    Diagnosed with DM: diagnosed in  and the diagnosis was incidentally on screening labs - insulin was first started a few years/month after time of diagnosis    History of DKA/HHS: no    FH of DM: brother with T1DM -  at age 26 from DKA    Glycemic control over the years: has been outstanding, most recently HbA1c 6.9% 2023 (stable)     Current DM therapy:  -Lantus 0-0-0-10 (Tresiba no longer covered by insurance)   -NovoLog ICR 1:15, CS: 1/2 unit per 30 mg/dL >150 mg/dL during the day and 1/2 unit per 60 mg/dL >150 mg/dL at bedtime     Prior DM therapy:  -No prior use of insulin pump therapy     Current glycemic control: CGM reviewed     Diet: 3 meals/day, snacks at night     Physical activity: he runs a few times per week     Tobacco/alcohol use: no tobacco use, 1 beer at night     Complications noted in the A/P section.     Objective:    BMI 25.3 kg/m2, /68. Appears well. Thyroid examined and normal.  No cervical lymphadenopathy.     2023: BMI 23.6 kg/m2, blood pressure 131/82. Appears well.  Thyroid examined and normal.  No cervical lymphadenopathy.  No lipohypertrophy at his abdominal insulin injection sites.  Diabetic foot exam performed and normal.    Assessment/Plan:    # DM-  -: DEE Ab >250 (ULN 5.0 international unit(s)/mL)   -2009: C-peptide 2.3 ng/mL with concurrent glucose 84 mg/dL  -He has glucagon available PRN   # No retinopathy, DM eye exam 2024  # No  hypertension, no nephropathy, not on an ACE-I/ARB    -7/2023: GFR >90, urine microalbumin undetectable   # No peripheral neuropathy, no prior foot ulceration   # No prior ASCVD event, not on ASA, on atorvastatin 10 mg daily     -7/2023: , TG 27, HDL 51, LDL 65  # Hashimoto's thyroiditis   -TSH and free T4 always normal previously, most recently 7/2023 TSH 2.2  -2009: elevated TPO Ab   -No prior use of thyroid hormone   # Other  -7/2023: B12/MMA normal, Celiac screen normal      CGM reviewed in detail. GMI 7.0%.  No hypoglycemia. Only 2% very high. Time in range 68% but range is  mg/deciliter overnight on his CGM.    Amos is doing very well.  The only change I suggest is adding an extra half unit of NovoLog before meals.    Amos will be taking a 6-month sabbatical in [facility] and tentatively leaving August 2024 and returning in early 2025.  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    We will have him see Liz Conroy PA-C, in the summer 2024 and I ordered labs to be done before hand.  Return to see me in early 2025.  We did review insulin pump therapy today and we will consider this more when he returns from [facility].  Because he is doing so well there is no rush there but I do think he would ultimately benefit from using an insulin pump.    20 minutes spent on the date of the encounter doing chart review, history and exam, documentation and further activities as noted above.  This did not include time spent on CGM review. The longitudinal plan of care for Amos was addressed during this visit. Due to the added complexity in care, I will continue to support Amos in the subsequent management of DM and with the ongoing continuity of care of this condition.      Again, thank you for allowing me to participate in the care of your patient.        Sincerely,        Neto Donahue MD

## 2024-02-01 ENCOUNTER — MYC REFILL (OUTPATIENT)
Dept: ENDOCRINOLOGY | Facility: CLINIC | Age: 54
End: 2024-02-01

## 2024-02-01 DIAGNOSIS — E10.65 TYPE 1 DIABETES MELLITUS WITH HYPERGLYCEMIA (H): ICD-10-CM

## 2024-02-13 ENCOUNTER — MYC MEDICAL ADVICE (OUTPATIENT)
Dept: ENDOCRINOLOGY | Facility: CLINIC | Age: 54
End: 2024-02-13
Payer: COMMERCIAL

## 2024-02-13 NOTE — LETTER
2/13/2024     RE: Franklin Muir  [address]  [place] [address]      BILINGUAL MEDICAL CERTIFICATE   CERTIFICADO MÉDICO BILINGÜE   (The content of this certificate is an exact copy In English and in Persian)   (El contenido de jose certificado es exactamente igual en inglés ramon en español)   Medical Certificate of Good Health   This certificate verifies that Mr. Franklin Muir is free of drug addiction, mental illness, and does not suffer from any disease that could cause serious repercussions to public health according to the specifications of the International Health Regulations of 2005. These contagious diseases include, but are not limited to: smallpox, poliomyelitis by wild polio virus, the human influenza caused by a new subtype of virus and the severe acute respiratory syndrome (SARS), cholera, pneumonic plague, yellow fever, viral hemorrhagic fevers (e.g.: Ebola, Lassa, Marburg), West Nile Virus and other illnesses of special importance nationally or regionally (e.g.: Dengue Fever, [place] Fever, and meningococcal disease).   Certificado Médico de [place]   Por el presente se certifica que el Sr. Franklin Muir no padece ninguna drogodependencia, enfermedad mental o alguna de las enfermedades que suponen riesgo para la leonie pública de conformidad con lo dispuesto en el Reglamento Sanitario Internacional de 2005. Estas enfermedades incluyen, entre otras: la viruela, poliomielitis por poliovirus, gripe humana causada por nuevos subtipos de virus, síndrome respiratorio julian severo (SARS), cólera, neumonía, fiebre amarilla, las fiebres hemorrágicas virales (ramon el [place], Lassa, Marburgo, etc.), la fiebre del [place] y otras enfermedades de ámbito nacional o regional (ramon el Dengue, fiebre [place] del Rift, síndrome meningocócico, etc.)         Medical Center Stamp Doctor's Signature                               Doctor's License Number                                     (Sello  del centro médico) (Firma del médico)                           (Número de Registro)   (Mandatory/Mandatorio)   In _________________________ Day Month Year . (En) (Josette) (Mes) (Año)

## 2024-02-20 NOTE — TELEPHONE ENCOUNTER
Original mailed to pt via [employer].   Pt notified.   Copy on file   Copy to scan.  Shavon Larson RN on 2/20/2024 at 7:12 AM

## 2024-02-21 DIAGNOSIS — E10.9 TYPE 1 DIABETES MELLITUS WITHOUT COMPLICATION (H): ICD-10-CM

## 2024-02-22 RX ORDER — INSULIN LISPRO 100 [IU]/ML
INJECTION, SOLUTION SUBCUTANEOUS
Qty: 30 ML | Refills: 1 | Status: SHIPPED | OUTPATIENT
Start: 2024-02-22 | End: 2024-07-15

## 2024-02-22 NOTE — TELEPHONE ENCOUNTER
"    Requested Prescriptions   Pending Prescriptions Disp Refills    insulin lispro (HUMALOG CORDELIA KWIKPEN) 100 UNIT/ML (0.5 unit dial) KWIKPEN [Pharmacy Med Name: HUMALOG CORDELIA KWIKPEN 100 SOPN] 30 mL 4     Sig: ICR OF 1:15 + (CS) UP TO 6 TIMES DAILY WITH MEALS AND SNACKS, UP TO 30 UNITS/DAY       Short Acting Insulin Protocol Failed - 2/21/2024  2:47 PM        Failed - HgbA1C in past 3 or 6 months     If HgbA1C is 8 or greater, it needs to be on file within the past 3 months.  If less than 8, must be on file within the past 6 months.     Recent Labs   Lab Test 08/14/23  0936 02/02/23  0912 07/26/22  0929   A1C  --   --  6.6*   HEMOGLOBINA1 6.9   < >  --     < > = values in this interval not displayed.             Passed - Serum creatinine on file in past 12 months     Recent Labs   Lab Test 07/31/23  1021   CR 0.91       Ok to refill medication if creatinine is low          Passed - Medication is active on med list        Passed - Patient is age 18 or older        Passed - Recent (6 mo) or future (30 days) visit within the authorizing provider's specialty     Patient had office visit in the last 6 months or has a visit in the next 30 days with authorizing provider or within the authorizing provider's specialty.  See \"Patient Info\" tab in inbasket, or \"Choose Columns\" in Meds & Orders section of the refill encounter.           Insulin Protocol Failed - 2/21/2024  2:47 PM        Failed - Chart Review Required     Review Chart.    Do not approve if insulin is used in a pump.  Instead, direct refill request to the patient's endocrinologist.  If the patient doesn't have an endocrinologist, then send the refill to the patient's PCP for review            Passed - Medication is active on med list        Passed - Has GFR on file in past 12 months and most recent value is normal        Passed - Recent (6 mo) or future (90 days) visit within the authorizing provider's specialty     The patient must have completed an " in-person or virtual visit within the past 6 months or has a future visit scheduled within the next 90 days with the authorizing provider s specialty.  Urgent care and e-visits do not quality as an office visit for this protocol.          Passed - Medication indicated for associated diagnosis     Medication is associated with one or more of the following diagnoses:   - Type 1 diabetes mellitus  - Type 2 diabetes mellitus  - Diabetic nephropathy; Prophylaxis  - Neuropathy due to diabetes mellitus; Prophylaxis  - Retinopathy due to diabetes mellitus; Prophylaxis  - Diabetes mellitus associated with cystic fibrosis  - Disorder of cardiovascular system; Prophylaxis - Type 1 diabetes mellitus   - Disorder of cardiovascular system; Prophylaxis - Type 2 diabetes mellitus            Passed - Patient is 18 years of age or older

## 2024-02-26 DIAGNOSIS — E10.9 WELL CONTROLLED TYPE 1 DIABETES MELLITUS (H): ICD-10-CM

## 2024-02-29 RX ORDER — PROCHLORPERAZINE 25 MG/1
SUPPOSITORY RECTAL
Qty: 1 EACH | Refills: 3 | Status: SHIPPED | OUTPATIENT
Start: 2024-02-29 | End: 2024-07-15

## 2024-04-14 ENCOUNTER — HEALTH MAINTENANCE LETTER (OUTPATIENT)
Age: 54
End: 2024-04-14

## 2024-04-16 DIAGNOSIS — E10.9 TYPE 1 DIABETES MELLITUS WITHOUT COMPLICATION (H): ICD-10-CM

## 2024-04-17 RX ORDER — INSULIN GLARGINE 100 [IU]/ML
10 INJECTION, SOLUTION SUBCUTANEOUS AT BEDTIME
Qty: 15 ML | Refills: 0 | Status: SHIPPED | OUTPATIENT
Start: 2024-04-17 | End: 2024-07-05

## 2024-04-17 NOTE — TELEPHONE ENCOUNTER
Has had severe pain in both knees for the last few months,  Has never had a w/u for arthritis. No injuries to either of the knees. Has had a hard time walking climbing stairs,   Will refer to orthopedic surgery.    Start nsaids, Requested Prescriptions   Signed Prescriptions Disp Refills    insulin glargine (LANTUS SOLOSTAR) 100 UNIT/ML pen 15 mL 0     Sig: Inject 10 Units Subcutaneous at bedtime       Insulin Protocol Failed - 4/16/2024 11:18 PM        Failed - Chart Review Required     Review Chart.    Do not approve if insulin is used in a pump.  Instead, direct refill request to the patient's endocrinologist.  If the patient doesn't have an endocrinologist, then send the refill to the patient's PCP for review            Passed - Medication is active on med list        Passed - Has GFR on file in past 12 months and most recent value is normal        Passed - Recent (6 mo) or future (90 days) visit within the authorizing provider's specialty     The patient must have completed an in-person or virtual visit within the past 6 months or has a future visit scheduled within the next 90 days with the authorizing provider s specialty.  Urgent care and e-visits do not quality as an office visit for this protocol.          Passed - Medication indicated for associated diagnosis     Medication is associated with one or more of the following diagnoses:   - Type 1 diabetes mellitus  - Type 2 diabetes mellitus  - Diabetic nephropathy; Prophylaxis  - Neuropathy due to diabetes mellitus; Prophylaxis  - Retinopathy due to diabetes mellitus; Prophylaxis  - Diabetes mellitus associated with cystic fibrosis  - Disorder of cardiovascular system; Prophylaxis - Type 1 diabetes mellitus   - Disorder of cardiovascular system; Prophylaxis - Type 2 diabetes mellitus            Passed - Patient is 18 years of age or older

## 2024-05-21 DIAGNOSIS — E78.2 MIXED HYPERLIPIDEMIA: ICD-10-CM

## 2024-05-21 RX ORDER — ATORVASTATIN CALCIUM 10 MG/1
10 TABLET, FILM COATED ORAL DAILY
Qty: 90 TABLET | Refills: 2 | Status: SHIPPED | OUTPATIENT
Start: 2024-05-21 | End: 2024-07-15

## 2024-05-22 NOTE — TELEPHONE ENCOUNTER
Prescription approved per [facility] Refill Protocol.  Evangelina Alexandre RN on 5/21/2024 at 9:59 PM

## 2024-06-26 ENCOUNTER — TELEPHONE (OUTPATIENT)
Dept: FAMILY MEDICINE | Facility: CLINIC | Age: 54
End: 2024-06-26
Payer: COMMERCIAL

## 2024-06-26 NOTE — TELEPHONE ENCOUNTER
Patient Quality Outreach    Patient is due for the following:   Diabetes -  A1C and Diabetic Follow-Up Visit  Physical Preventive Adult Physical    Next Steps:   Schedule a Adult Preventative    Type of outreach:    Sent [employer] message.      Questions for provider review:    None           Deedee Saucedo CMA  Chart routed to Care Team.  '

## 2024-07-05 DIAGNOSIS — E10.9 TYPE 1 DIABETES MELLITUS WITHOUT COMPLICATION (H): ICD-10-CM

## 2024-07-05 RX ORDER — INSULIN GLARGINE 100 [IU]/ML
INJECTION, SOLUTION SUBCUTANEOUS
Qty: 15 ML | Refills: 0 | Status: SHIPPED | OUTPATIENT
Start: 2024-07-05 | End: 2024-07-15

## 2024-07-05 NOTE — TELEPHONE ENCOUNTER
Requested Prescriptions   Pending Prescriptions Disp Refills    LANTUS SOLOSTAR 100 UNIT/ML soln [Pharmacy Med Name: LANTUS SOLOSTAR 100UNIT/ML SOPN] 15 mL 0     Sig: INJECT 10 UNITS UNDER THE SKIN AT BEDTIME       Insulin Protocol Failed - 7/5/2024  1:51 PM        Failed - Chart Review Required     Review Chart.    Do not approve if insulin is used in a pump.  Instead, direct refill request to the patient's endocrinologist.  If the patient doesn't have an endocrinologist, then send the refill to the patient's PCP for review            Passed - Medication is active on med list        Passed - Has GFR on file in past 12 months and most recent value is normal        Passed - Recent (6 mo) or future (90 days) visit within the authorizing provider's specialty     The patient must have completed an in-person or virtual visit within the past 6 months or has a future visit scheduled within the next 90 days with the authorizing provider s specialty.  Urgent care and e-visits do not quality as an office visit for this protocol.          Passed - Medication indicated for associated diagnosis     Medication is associated with one or more of the following diagnoses:   - Type 1 diabetes mellitus  - Type 2 diabetes mellitus  - Diabetic nephropathy; Prophylaxis  - Neuropathy due to diabetes mellitus; Prophylaxis  - Retinopathy due to diabetes mellitus; Prophylaxis  - Diabetes mellitus associated with cystic fibrosis  - Disorder of cardiovascular system; Prophylaxis - Type 1 diabetes mellitus   - Disorder of cardiovascular system; Prophylaxis - Type 2 diabetes mellitus            Passed - Patient is 18 years of age or older

## 2024-07-08 ENCOUNTER — LAB (OUTPATIENT)
Dept: LAB | Facility: CLINIC | Age: 54
End: 2024-07-08
Payer: COMMERCIAL

## 2024-07-08 DIAGNOSIS — E10.9 TYPE 1 DIABETES MELLITUS WITHOUT COMPLICATION (H): ICD-10-CM

## 2024-07-08 DIAGNOSIS — E78.2 MIXED HYPERLIPIDEMIA: ICD-10-CM

## 2024-07-08 DIAGNOSIS — E06.3 HASHIMOTO'S THYROIDITIS: ICD-10-CM

## 2024-07-08 LAB
CHOLEST SERPL-MCNC: 124 MG/DL
CREAT SERPL-MCNC: 0.92 MG/DL (ref 0.67–1.17)
CREAT UR-MCNC: 160 MG/DL
EGFRCR SERPLBLD CKD-EPI 2021: >90 ML/MIN/1.73M2
FASTING STATUS PATIENT QL REPORTED: YES
HBA1C MFR BLD: 6.6 % (ref 0–5.6)
HDLC SERPL-MCNC: 56 MG/DL
LDLC SERPL CALC-MCNC: 60 MG/DL
MICROALBUMIN UR-MCNC: <12 MG/L
MICROALBUMIN/CREAT UR: NORMAL MG/G{CREAT}
NONHDLC SERPL-MCNC: 68 MG/DL
TRIGL SERPL-MCNC: 40 MG/DL
TSH SERPL DL<=0.005 MIU/L-ACNC: 2.73 UIU/ML (ref 0.3–4.2)

## 2024-07-08 PROCEDURE — 82043 UR ALBUMIN QUANTITATIVE: CPT

## 2024-07-08 PROCEDURE — 82565 ASSAY OF CREATININE: CPT

## 2024-07-08 PROCEDURE — 36415 COLL VENOUS BLD VENIPUNCTURE: CPT

## 2024-07-08 PROCEDURE — 82570 ASSAY OF URINE CREATININE: CPT

## 2024-07-08 PROCEDURE — 84443 ASSAY THYROID STIM HORMONE: CPT

## 2024-07-08 PROCEDURE — 83036 HEMOGLOBIN GLYCOSYLATED A1C: CPT

## 2024-07-08 PROCEDURE — 80061 LIPID PANEL: CPT

## 2024-07-12 NOTE — PROGRESS NOTES
"HPI:   Franklin is a pleasant 55 yo man here for follow up of type 1 diabetes, diagnosed in his 30's.  He also sees Dr. Donahue. He reports that he has been doing well.  He is currently taking Lantus 9 units every PM and Humalog 1 unit/15 grams of CHO with meals and snacks, and for pre-meal correction @ 1/2 U per 30 mg/dL above 150 during the day and over 200 at HS. He has been physically active.  He and his family will be going on [employer] in [facility] for 6 months, starting next week. He wants to be sure that he has all his supplies required to go.  Glucose has been well controlled.     Franklin wears a Dexcom G6 sensor with overall average of 158 mg/dL (CV 31%) for the past two weeks. Recent glucose is as follows:     Blood Glucose Monitoring :                  Protein bar for breakfast, \"normal\" lunch, dinner, and sizeable snack (70g) around 1am.     Runs about 3 days a week. He continues running 5-6 miles at a time.       Franklin also has a history of Hashimoto's ab positivity with normal function, has never been on levothyroxine replacement therapy.  He has no palpitations. He generally has been feeling well and has no concerns today.     ROS   GENERAL: Weight stable. No fevers, chills, night sweats.      HEENT: no dysphagia, diplopia, neck pain or tenderness, dry/scratchy eyes, URI, cough, sinus drainage, tinnitus, sinus pressure  CV: no chest pain, pressure, palpitations, skipped beats, LOC  LUNGS: no SOB, FOUNTAIN, cough, sputum production, wheezing GI: no diarrhea, constipation, abdominal pain  EXTREMITIES: +plantar fasciitis, improved. No rashes, ulcers, edema  NEUROLOGY: no changes in vision, tingling or numbness in hands or feet.   MSK: no muscle aches or pains, weakness  PSYCH: mood stable    Past Medical History:   Diagnosis Date    Chronic lymphocytic thyroiditis 07/20/2011    Chronic lymphocytic thyroiditis 07/20/2011    Chronic lymphocytic thyroiditis     Depressive disorder 06/01/1997    Treated " for a couple of years; not currently experiencing    Diabetes 06/23/2009       Past Surgical History:   Procedure Laterality Date    COLONOSCOPY WITH CO2 INSUFFLATION N/A 8/8/2022    Procedure: COLONOSCOPY, WITH CO2 INSUFFLATION;  Surgeon: Ashlyn Xavier MD;  Location: [facility] OR    HC TOOTH EXTRACTION W/FORCEP      VASECTOMY  01/2019       Family History   Problem Relation Age of Onset    Hyperlipidemia Mother     Diabetes Brother     Hypertension Paternal Grandfather     Cerebrovascular Disease Paternal Grandfather     Hypertension Maternal Grandfather     Hyperlipidemia Maternal Grandfather     Cerebrovascular Disease Maternal Grandfather     Breast Cancer Maternal Aunt     Diabetes Brother     Anxiety Disorder Brother     Coronary Artery Disease Other         Maternal great grandfather    Hypertension Maternal Grandmother     Hyperlipidemia Maternal Grandmother     Hyperlipidemia Brother     Cerebrovascular Disease Paternal Grandmother     Asthma No family hx of     C.A.D. No family hx of     Cancer - colorectal No family hx of     Prostate Cancer No family hx of        Social History     Social History    Marital status:      Spouse name: N/A    Number of children: N/A    Years of education: N/A     Social History Main Topics    Smoking status: Never Smoker    Smokeless tobacco: Never Used    Alcohol use Yes      Comment: ~ 1 drink/day    Drug use: No    Sexual activity: Yes     Partners: Female     Birth control/ protection: Condom, Natural Family Planning      Comment: Would like to talk about vasectomy     Other Topics Concern    Parent/Sibling W/ Cabg, Mi Or Angioplasty Before 65f 55m? No     Social History Narrative   Social Hx:   .  Has a son born in 2011.   at the [employer]. Now . Physically active- running and playing with his son. Runs.     Current Outpatient Medications   Medication Sig Dispense Refill    ACE/ARB NOT PRESCRIBED,  "INTENTIONAL, by Other route continuous prn.      alcohol swab prep pads Use to swab area of injection/azra as directed. 100 each 6    atorvastatin (LIPITOR) 10 MG tablet TAKE 1 TABLET BY MOUTH DAILY 90 tablet 2    blood glucose (NO BRAND SPECIFIED) test strip Use to test blood sugar 3 times daily or as directed. [employer] contour next 300 strip 11    blood glucose monitoring (ACCU-CHEK FASTCLIX) lancets Use to test blood sugar daily and prn 102 each 6    blood glucose monitoring (NO BRAND SPECIFIED) meter device kit Use to test blood sugar up to 4 times daily or as directed. Any covered brand. 1 kit 0    Continuous Blood Gluc Sensor (DEXCOM G6 SENSOR) MISC CHANGE  EVERY 10 DAYS 9 each 3    Continuous Blood Gluc Transmit (DEXCOM G6 TRANSMITTER) MISC CHANGE EVERY 3 MONTHS 1 each 3    Glucagon (BAQSIMI TWO PACK) 3 MG/DOSE POWD Spray 1 each in nostril once as needed (severe hypoglycemia) 2 each 3    insulin lispro (HUMALOG CORDELIA KWIKPEN) 100 UNIT/ML (0.5 unit dial) KWIKPEN ICR OF 1:15 + (CS) UP TO 6 TIMES DAILY WITH MEALS AND SNACKS, UP TO 30 UNITS/DAY 30 mL 1    insulin pen needle (PENTIPS) 31G X 5 MM miscellaneous Use 5 daily or as directed. 500 each 3    LANTUS SOLOSTAR 100 UNIT/ML soln INJECT 10 UNITS UNDER THE SKIN AT BEDTIME 15 mL 0    loratadine (CLARITIN) 10 MG tablet       naproxen sodium 220 MG capsule Take 220 mg by mouth 2 times daily (with meals)      PRECISION XTRA KETONE STRP Use as directed in case of high blood sugar or illness. 25 each 3    Sodium Fluoride 1.1 % PSTE Use once every night      VITAMIN D, CHOLECALCIFEROL, PO Take 1,000 Units by mouth daily       No current facility-administered medications for this visit.          Allergies   Allergen Reactions    Seasonal Allergies     Ampicillin Rash    Ceftin Rash       Physical Exam  /74   Pulse 59   Ht 1.791 m (5' 10.5\")   Wt 76.7 kg (169 lb)   SpO2 98%   BMI 23.91 kg/m    GENERAL:  Alert and oriented X3, NAD, well dressed, answering " questions appropriately, appears stated age.  HEENT: OP clear, no lymphadenopathy, no thyromegaly, non-tender, no exophthalmus, no proptosis, EOMI, no lid lag, no retraction  EXTREMITIES: no edema, +pulses, no rashes, no lesions.  +onychomycosis toenails left foot.   NEUROLOGY: + monofilament, +vibratory sensation, + DTR upper and lower extremity    RESULTS  Lab Results   Component Value Date    A1C 6.6 (H) 07/08/2024    A1C 6.6 (H) 07/26/2022    A1C 6.4 (H) 06/18/2021    A1C 6.8 (H) 07/03/2020    A1C 8.4 (H) 03/13/2017    A1C 7.6 (H) 02/18/2016    A1C 7.8 (H) 07/07/2015    HEMOGLOBINA1 6.9 08/14/2023    HEMOGLOBINA1 6.6 02/02/2023    HEMOGLOBINA1 6.4 01/17/2022    HEMOGLOBINA1 6.9 (A) 03/10/2020    HEMOGLOBINA1 6.8 (A) 09/10/2019       TSH   Date Value Ref Range Status   07/08/2024 2.73 0.30 - 4.20 uIU/mL Final   07/31/2023 2.18 0.30 - 4.20 uIU/mL Final   07/26/2022 2.02 0.40 - 4.00 mU/L Final   06/18/2021 2.39 0.40 - 4.00 mU/L Final   07/03/2020 2.72 0.40 - 4.00 mU/L Final   07/03/2020 2.72 0.40 - 4.00 mU/L Final   05/24/2019 1.89 0.40 - 4.00 mU/L Final   04/18/2018 2.20 0.40 - 4.00 mU/L Final     T4 Total   Date Value Ref Range Status   09/28/2010 9.0 5.0 - 11.0 ug/dL Final     T4 Free   Date Value Ref Range Status   02/18/2016 1.06 0.76 - 1.46 ng/dL Final   07/07/2015 1.04 0.76 - 1.46 ng/dL Final   01/05/2015 1.02 0.76 - 1.46 ng/dL Final     Comment:     Effective 7/30/2014, the reference range for this assay has changed to reflect   new instrumentation/methodology.     12/03/2013 1.08 0.70 - 1.85 ng/dL Final   01/26/2011 1.10 0.70 - 1.85 ng/dL Final       ALT   Date Value Ref Range Status   05/24/2019 25 0 - 70 U/L Final   03/13/2017 24 0 - 70 U/L Final   ]    Recent Labs   Lab Test 07/08/24  0933 07/31/23  1021   CHOL 124 121   HDL 56 51   LDL 60 65   TRIG 40 27       Lab Results   Component Value Date     07/26/2022     03/13/2017      Lab Results   Component Value Date    POTASSIUM 4.3 07/26/2022     POTASSIUM 3.9 06/18/2021     Lab Results   Component Value Date    CHLORIDE 105 07/26/2022    CHLORIDE 102 03/13/2017     Lab Results   Component Value Date    SHANIKA 9.1 07/26/2022    SHANIKA 9.1 03/13/2017     Lab Results   Component Value Date    CO2 29 07/26/2022    CO2 31 03/13/2017     Lab Results   Component Value Date    BUN 16 07/26/2022    BUN 15 03/13/2017     Lab Results   Component Value Date    CR 0.92 07/08/2024    CR 0.85 06/18/2021       GFR Estimate   Date Value Ref Range Status   07/08/2024 >90 >60 mL/min/1.73m2 Final     Comment:     eGFR calculated using 2021 CKD-EPI equation.   07/31/2023 >90 >60 mL/min/1.73m2 Final   07/26/2022 >90 >60 mL/min/1.73m2 Final     Comment:     Effective December 21, 2021 eGFRcr in adults is calculated using the 2021 CKD-EPI creatinine equation which includes age and gender (Michael valle al., [place], DOI: 10.1056/TTUYfp2198733)   06/18/2021 >90 >60 mL/min/[1.73_m2] Final     Comment:     Non  GFR Calc  Starting 12/18/2018, serum creatinine based estimated GFR (eGFR) will be   calculated using the Chronic Kidney Disease Epidemiology Collaboration   (CKD-EPI) equation.     07/03/2020 >90 >60 mL/min/[1.73_m2] Final     Comment:     Non  GFR Calc  Starting 12/18/2018, serum creatinine based estimated GFR (eGFR) will be   calculated using the Chronic Kidney Disease Epidemiology Collaboration   (CKD-EPI) equation.     05/24/2019 >90 >60 mL/min/[1.73_m2] Final     Comment:     Non  GFR Calc  Starting 12/18/2018, serum creatinine based estimated GFR (eGFR) will be   calculated using the Chronic Kidney Disease Epidemiology Collaboration   (CKD-EPI) equation.       GFR Estimate If Black   Date Value Ref Range Status   06/18/2021 >90 >60 mL/min/[1.73_m2] Final     Comment:      GFR Calc  Starting 12/18/2018, serum creatinine based estimated GFR (eGFR) will be   calculated using the Chronic Kidney Disease Epidemiology  "Collaboration   (CKD-EPI) equation.     07/03/2020 >90 >60 mL/min/[1.73_m2] Final     Comment:      GFR Calc  Starting 12/18/2018, serum creatinine based estimated GFR (eGFR) will be   calculated using the Chronic Kidney Disease Epidemiology Collaboration   (CKD-EPI) equation.     05/24/2019 >90 >60 mL/min/[1.73_m2] Final     Comment:      GFR Calc  Starting 12/18/2018, serum creatinine based estimated GFR (eGFR) will be   calculated using the Chronic Kidney Disease Epidemiology Collaboration   (CKD-EPI) equation.         Lab Results   Component Value Date    MICROL <12.0 07/08/2024    MICROL <5 07/26/2022    MICROL <5 06/18/2021     No results found for: \"MICROALBUMIN\"  Lab Results   Component Value Date    CPEPT 2.3 11/19/2009    GADAB >250.0 (H) 11/19/2009    ISCAB  09/08/2009     1:16  Reference range: <1:4  (Note)  TEST INFORMATION: Islet Cell Ab, IgG  Islet cell antibodies have been associated with  \"autoimmune\" endocrine disorders and insulin-dependent  diabetes. This disorder is characterized by the presence  of antibodies in patients that may be detected years  before the onset of the clinical symptoms. To calculate  Juvenile Diabetes Foundation (JDF) units: multiply the  titer x 5 (1:8  8 x 5 = 40 JDF Units).  Performed by [facility],  [address] [place] [id number] [phone]  [email or web address], Evelia Miles MD, Lab. Director       Vitamin B12   Date Value Ref Range Status   07/31/2023 289 232 - 1,245 pg/mL Final   ]    Most recent eye exam date: : Not Found     Computed FIB-4 Calculation unavailable. One or more values for this score either were not found within the given timeframe or did not fit some other criterion.  A value of FIB-4 below 1.30 is considered as low risk for advanced fibrosis; a value of FIB-4 over 2.67 is considered as high risk for advanced fibrosis; and FIB-4 values between 1.30 and 2.67 are considered as intermediate risk of advanced " fibrosis.      Eye exam in January, 2022- no retinopathy.      Assessment/Plan:     1.  Type 1 diabetes- Franklin's glucose control is excellent. A1c is 6.6%.  His variability is low with very little hypoglycemia. No changes.  Gave 3 mo supply of insulin/supplies.  Will wait to upgrade to G7 until he returns to the .     2.  Risk factors-     Retinopathy:  No.  Had eye exam in January, 2023.    Nephropathy:  BP historically well controlled. No microalbuminuria.  Creatinine stable.   Neuropathy: No.    Feet: OK, no ulcers. No swelling in joints.    Lipids:  LDL at target.  Patient taking statin.  Celiac screening: negative 2018    3.  Positive TPO antibodies-TSH normal on no therapy.     4. F/U in 6 months with Dr. Donahue, when he returns from [facility], in 1 year with me.     32 minutes spent on the date of the encounter doing chart review, review of test results, review of continuous glucose sensor, interpretation of glucose data, patient visit and documentation, counseling/coordination of care, and discussion of follow up plan for worsening hyper and hypoglycemia.  The patient understood and is satisfied with today's visit.        Liz Conroy PA-C, MPAS   [facility]  Department of Medicine  Division of Endocrinology and Diabetes

## 2024-07-15 ENCOUNTER — OFFICE VISIT (OUTPATIENT)
Dept: ENDOCRINOLOGY | Facility: CLINIC | Age: 54
End: 2024-07-15
Payer: COMMERCIAL

## 2024-07-15 VITALS
SYSTOLIC BLOOD PRESSURE: 117 MMHG | WEIGHT: 169 LBS | OXYGEN SATURATION: 98 % | HEART RATE: 59 BPM | DIASTOLIC BLOOD PRESSURE: 74 MMHG | BODY MASS INDEX: 23.66 KG/M2 | HEIGHT: 71 IN

## 2024-07-15 DIAGNOSIS — E10.9 TYPE 1 DIABETES MELLITUS WITHOUT COMPLICATION (H): ICD-10-CM

## 2024-07-15 DIAGNOSIS — E78.2 MIXED HYPERLIPIDEMIA: ICD-10-CM

## 2024-07-15 DIAGNOSIS — E10.9 WELL CONTROLLED TYPE 1 DIABETES MELLITUS (H): ICD-10-CM

## 2024-07-15 PROCEDURE — 99214 OFFICE O/P EST MOD 30 MIN: CPT | Performed by: PHYSICIAN ASSISTANT

## 2024-07-15 RX ORDER — ATORVASTATIN CALCIUM 10 MG/1
10 TABLET, FILM COATED ORAL DAILY
Qty: 90 TABLET | Refills: 3 | Status: SHIPPED | OUTPATIENT
Start: 2024-07-15

## 2024-07-15 RX ORDER — PROCHLORPERAZINE 25 MG/1
SUPPOSITORY RECTAL
Qty: 9 EACH | Refills: 3 | Status: SHIPPED | OUTPATIENT
Start: 2024-07-15

## 2024-07-15 RX ORDER — GLUCAGON 3 MG/1
3 POWDER NASAL
Qty: 2 EACH | Refills: 3 | Status: SHIPPED | OUTPATIENT
Start: 2024-07-15

## 2024-07-15 RX ORDER — PEN NEEDLE, DIABETIC 31 GX3/16"
NEEDLE, DISPOSABLE MISCELLANEOUS
Qty: 500 EACH | Refills: 3 | Status: SHIPPED | OUTPATIENT
Start: 2024-07-15

## 2024-07-15 RX ORDER — INSULIN GLARGINE 100 [IU]/ML
INJECTION, SOLUTION SUBCUTANEOUS
Qty: 15 ML | Refills: 3 | Status: SHIPPED | OUTPATIENT
Start: 2024-07-15

## 2024-07-15 RX ORDER — INSULIN LISPRO 100 [IU]/ML
INJECTION, SOLUTION SUBCUTANEOUS
Qty: 30 ML | Refills: 3 | Status: SHIPPED | OUTPATIENT
Start: 2024-07-15

## 2024-07-15 RX ORDER — PROCHLORPERAZINE 25 MG/1
SUPPOSITORY RECTAL
Qty: 1 EACH | Refills: 3 | Status: SHIPPED | OUTPATIENT
Start: 2024-07-15

## 2024-07-15 ASSESSMENT — PAIN SCALES - GENERAL: PAINLEVEL: NO PAIN (0)

## 2024-07-15 NOTE — LETTER
"7/15/2024       RE: Franklin Muir  [address]  [place] [address]     Dear Colleague,    Thank you for referring your patient, Franklin Muir, to the [facility] [place] at [facility]. Please see a copy of my visit note below.    HPI:   Franklin is a pleasant 55 yo man here for follow up of type 1 diabetes, diagnosed in his 30's.  He also sees Dr. Donahue. He reports that he has been doing well.  He is currently taking Lantus 9 units every PM and Humalog 1 unit/15 grams of CHO with meals and snacks, and for pre-meal correction @ 1/2 U per 30 mg/dL above 150 during the day and over 200 at HS. He has been physically active.  He and his family will be going on [place] in [facility] for 6 months, starting next week. He wants to be sure that he has all his supplies required to go.  Glucose has been well controlled.     Franklin wears a Dexcom G6 sensor with overall average of 158 mg/dL (CV 31%) for the past two weeks. Recent glucose is as follows:     Blood Glucose Monitoring :                  Protein bar for breakfast, \"normal\" lunch, dinner, and sizeable snack (70g) around 1am.     Runs about 3 days a week. He continues running 5-6 miles at a time.       Franklin also has a history of Hashimoto's ab positivity with normal function, has never been on levothyroxine replacement therapy.  He has no palpitations. He generally has been feeling well and has no concerns today.     ROS   GENERAL: Weight stable. No fevers, chills, night sweats.      HEENT: no dysphagia, diplopia, neck pain or tenderness, dry/scratchy eyes, URI, cough, sinus drainage, tinnitus, sinus pressure  CV: no chest pain, pressure, palpitations, skipped beats, LOC  LUNGS: no SOB, FOUNTAIN, cough, sputum production, wheezing GI: no diarrhea, constipation, abdominal pain  EXTREMITIES: +plantar fasciitis, improved. No rashes, ulcers, edema  NEUROLOGY: no changes in " vision, tingling or numbness in hands or feet.   MSK: no muscle aches or pains, weakness  PSYCH: mood stable    Past Medical History:   Diagnosis Date    Chronic lymphocytic thyroiditis 07/20/2011    Chronic lymphocytic thyroiditis 07/20/2011    Chronic lymphocytic thyroiditis     Depressive disorder 06/01/1997    Treated for a couple of years; not currently experiencing    Diabetes 06/23/2009       Past Surgical History:   Procedure Laterality Date    COLONOSCOPY WITH CO2 INSUFFLATION N/A 8/8/2022    Procedure: COLONOSCOPY, WITH CO2 INSUFFLATION;  Surgeon: Ashlyn Xavier MD;  Location: [facility] OR    HC TOOTH EXTRACTION W/FORCEP      VASECTOMY  01/2019       Family History   Problem Relation Age of Onset    Hyperlipidemia Mother     Diabetes Brother     Hypertension Paternal Grandfather     Cerebrovascular Disease Paternal Grandfather     Hypertension Maternal Grandfather     Hyperlipidemia Maternal Grandfather     Cerebrovascular Disease Maternal Grandfather     Breast Cancer Maternal Aunt     Diabetes Brother     Anxiety Disorder Brother     Coronary Artery Disease Other         Maternal great grandfather    Hypertension Maternal Grandmother     Hyperlipidemia Maternal Grandmother     Hyperlipidemia Brother     Cerebrovascular Disease Paternal Grandmother     Asthma No family hx of     C.A.D. No family hx of     Cancer - colorectal No family hx of     Prostate Cancer No family hx of        Social History     Social History    Marital status:      Spouse name: N/A    Number of children: N/A    Years of education: N/A     Social History Main Topics    Smoking status: Never Smoker    Smokeless tobacco: Never Used    Alcohol use Yes      Comment: ~ 1 drink/day    Drug use: No    Sexual activity: Yes     Partners: Female     Birth control/ protection: Condom, Natural Family Planning      Comment: Would like to talk about vasectomy     Other Topics Concern    Parent/Sibling W/ Cabg, Mi Or Angioplasty Before 65f  55m? No     Social History Narrative   Social Hx:   .  Has a son born in 2011.   at the [employer]. Now . Physically active- running and playing with his son. Runs.     Current Outpatient Medications   Medication Sig Dispense Refill    ACE/ARB NOT PRESCRIBED, INTENTIONAL, by Other route continuous prn.      alcohol swab prep pads Use to swab area of injection/azra as directed. 100 each 6    atorvastatin (LIPITOR) 10 MG tablet TAKE 1 TABLET BY MOUTH DAILY 90 tablet 2    blood glucose (NO BRAND SPECIFIED) test strip Use to test blood sugar 3 times daily or as directed. Harper contour next 300 strip 11    blood glucose monitoring (ACCU-CHEK FASTCLIX) lancets Use to test blood sugar daily and prn 102 each 6    blood glucose monitoring (NO BRAND SPECIFIED) meter device kit Use to test blood sugar up to 4 times daily or as directed. Any covered brand. 1 kit 0    Continuous Blood Gluc Sensor (DEXCOM G6 SENSOR) MISC CHANGE  EVERY 10 DAYS 9 each 3    Continuous Blood Gluc Transmit (DEXCOM G6 TRANSMITTER) MISC CHANGE EVERY 3 MONTHS 1 each 3    Glucagon (BAQSIMI TWO PACK) 3 MG/DOSE POWD Spray 1 each in nostril once as needed (severe hypoglycemia) 2 each 3    insulin lispro (HUMALOG CORDELIA KWIKPEN) 100 UNIT/ML (0.5 unit dial) KWIKPEN ICR OF 1:15 + (CS) UP TO 6 TIMES DAILY WITH MEALS AND SNACKS, UP TO 30 UNITS/DAY 30 mL 1    insulin pen needle (PENTIPS) 31G X 5 MM miscellaneous Use 5 daily or as directed. 500 each 3    LANTUS SOLOSTAR 100 UNIT/ML soln INJECT 10 UNITS UNDER THE SKIN AT BEDTIME 15 mL 0    loratadine (CLARITIN) 10 MG tablet       naproxen sodium 220 MG capsule Take 220 mg by mouth 2 times daily (with meals)      PRECISION XTRA KETONE STRP Use as directed in case of high blood sugar or illness. 25 each 3    Sodium Fluoride 1.1 % PSTE Use once every night      VITAMIN D, CHOLECALCIFEROL, PO Take 1,000 Units by mouth daily       No current facility-administered  "medications for this visit.          Allergies   Allergen Reactions    Seasonal Allergies     Ampicillin Rash    Ceftin Rash       Physical Exam  /74   Pulse 59   Ht 1.791 m (5' 10.5\")   Wt 76.7 kg (169 lb)   SpO2 98%   BMI 23.91 kg/m    GENERAL:  Alert and oriented X3, NAD, well dressed, answering questions appropriately, appears stated age.  HEENT: OP clear, no lymphadenopathy, no thyromegaly, non-tender, no exophthalmus, no proptosis, EOMI, no lid lag, no retraction  EXTREMITIES: no edema, +pulses, no rashes, no lesions.  +onychomycosis toenails left foot.   NEUROLOGY: + monofilament, +vibratory sensation, + DTR upper and lower extremity    RESULTS  Lab Results   Component Value Date    A1C 6.6 (H) 07/08/2024    A1C 6.6 (H) 07/26/2022    A1C 6.4 (H) 06/18/2021    A1C 6.8 (H) 07/03/2020    A1C 8.4 (H) 03/13/2017    A1C 7.6 (H) 02/18/2016    A1C 7.8 (H) 07/07/2015    HEMOGLOBINA1 6.9 08/14/2023    HEMOGLOBINA1 6.6 02/02/2023    HEMOGLOBINA1 6.4 01/17/2022    HEMOGLOBINA1 6.9 (A) 03/10/2020    HEMOGLOBINA1 6.8 (A) 09/10/2019       TSH   Date Value Ref Range Status   07/08/2024 2.73 0.30 - 4.20 uIU/mL Final   07/31/2023 2.18 0.30 - 4.20 uIU/mL Final   07/26/2022 2.02 0.40 - 4.00 mU/L Final   06/18/2021 2.39 0.40 - 4.00 mU/L Final   07/03/2020 2.72 0.40 - 4.00 mU/L Final   07/03/2020 2.72 0.40 - 4.00 mU/L Final   05/24/2019 1.89 0.40 - 4.00 mU/L Final   04/18/2018 2.20 0.40 - 4.00 mU/L Final     T4 Total   Date Value Ref Range Status   09/28/2010 9.0 5.0 - 11.0 ug/dL Final     T4 Free   Date Value Ref Range Status   02/18/2016 1.06 0.76 - 1.46 ng/dL Final   07/07/2015 1.04 0.76 - 1.46 ng/dL Final   01/05/2015 1.02 0.76 - 1.46 ng/dL Final     Comment:     Effective 7/30/2014, the reference range for this assay has changed to reflect   new instrumentation/methodology.     12/03/2013 1.08 0.70 - 1.85 ng/dL Final   01/26/2011 1.10 0.70 - 1.85 ng/dL Final       ALT   Date Value Ref Range Status   05/24/2019 25 0 " - 70 U/L Final   03/13/2017 24 0 - 70 U/L Final   ]    Recent Labs   Lab Test 07/08/24  0933 07/31/23  1021   CHOL 124 121   HDL 56 51   LDL 60 65   TRIG 40 27       Lab Results   Component Value Date     07/26/2022     03/13/2017      Lab Results   Component Value Date    POTASSIUM 4.3 07/26/2022    POTASSIUM 3.9 06/18/2021     Lab Results   Component Value Date    CHLORIDE 105 07/26/2022    CHLORIDE 102 03/13/2017     Lab Results   Component Value Date    SHANIKA 9.1 07/26/2022    SHANIKA 9.1 03/13/2017     Lab Results   Component Value Date    CO2 29 07/26/2022    CO2 31 03/13/2017     Lab Results   Component Value Date    BUN 16 07/26/2022    BUN 15 03/13/2017     Lab Results   Component Value Date    CR 0.92 07/08/2024    CR 0.85 06/18/2021       GFR Estimate   Date Value Ref Range Status   07/08/2024 >90 >60 mL/min/1.73m2 Final     Comment:     eGFR calculated using 2021 CKD-EPI equation.   07/31/2023 >90 >60 mL/min/1.73m2 Final   07/26/2022 >90 >60 mL/min/1.73m2 Final     Comment:     Effective December 21, 2021 eGFRcr in adults is calculated using the 2021 CKD-EPI creatinine equation which includes age and gender (Michael valle al., [place], DOI: 10.1056/FKREpk6109818)   06/18/2021 >90 >60 mL/min/[1.73_m2] Final     Comment:     Non  GFR Calc  Starting 12/18/2018, serum creatinine based estimated GFR (eGFR) will be   calculated using the Chronic Kidney Disease Epidemiology Collaboration   (CKD-EPI) equation.     07/03/2020 >90 >60 mL/min/[1.73_m2] Final     Comment:     Non  GFR Calc  Starting 12/18/2018, serum creatinine based estimated GFR (eGFR) will be   calculated using the Chronic Kidney Disease Epidemiology Collaboration   (CKD-EPI) equation.     05/24/2019 >90 >60 mL/min/[1.73_m2] Final     Comment:     Non  GFR Calc  Starting 12/18/2018, serum creatinine based estimated GFR (eGFR) will be   calculated using the Chronic Kidney Disease Epidemiology  "Collaboration   (CKD-EPI) equation.       GFR Estimate If Black   Date Value Ref Range Status   06/18/2021 >90 >60 mL/min/[1.73_m2] Final     Comment:      GFR Calc  Starting 12/18/2018, serum creatinine based estimated GFR (eGFR) will be   calculated using the Chronic Kidney Disease Epidemiology Collaboration   (CKD-EPI) equation.     07/03/2020 >90 >60 mL/min/[1.73_m2] Final     Comment:      GFR Calc  Starting 12/18/2018, serum creatinine based estimated GFR (eGFR) will be   calculated using the Chronic Kidney Disease Epidemiology Collaboration   (CKD-EPI) equation.     05/24/2019 >90 >60 mL/min/[1.73_m2] Final     Comment:      GFR Calc  Starting 12/18/2018, serum creatinine based estimated GFR (eGFR) will be   calculated using the Chronic Kidney Disease Epidemiology Collaboration   (CKD-EPI) equation.         Lab Results   Component Value Date    MICROL <12.0 07/08/2024    MICROL <5 07/26/2022    MICROL <5 06/18/2021     No results found for: \"MICROALBUMIN\"  Lab Results   Component Value Date    CPEPT 2.3 11/19/2009    GADAB >250.0 (H) 11/19/2009    ISCAB  09/08/2009     1:16  Reference range: <1:4  (Note)  TEST INFORMATION: Islet Cell Ab, IgG  Islet cell antibodies have been associated with  \"autoimmune\" endocrine disorders and insulin-dependent  diabetes. This disorder is characterized by the presence  of antibodies in patients that may be detected years  before the onset of the clinical symptoms. To calculate  Juvenile Diabetes Foundation (JDF) units: multiply the  titer x 5 (1:8  8 x 5 = 40 JDF Units).  Performed by [facility],  [address] [place] [id number] [phone]  [email or web address], Evelia Miles MD, Lab. Director       Vitamin B12   Date Value Ref Range Status   07/31/2023 289 232 - 1,245 pg/mL Final   ]    Most recent eye exam date: : Not Found     Computed FIB-4 Calculation unavailable. One or more values for this score either were not " found within the given timeframe or did not fit some other criterion.  A value of FIB-4 below 1.30 is considered as low risk for advanced fibrosis; a value of FIB-4 over 2.67 is considered as high risk for advanced fibrosis; and FIB-4 values between 1.30 and 2.67 are considered as intermediate risk of advanced fibrosis.      Eye exam in January, 2022- no retinopathy.      Assessment/Plan:     1.  Type 1 diabetes- Franklin's glucose control is excellent. A1c is 6.6%.  His variability is low with very little hypoglycemia. No changes.  Gave 3 mo supply of insulin/supplies.  Will wait to upgrade to G7 until he returns to the .     2.  Risk factors-     Retinopathy:  No.  Had eye exam in January, 2023.    Nephropathy:  BP historically well controlled. No microalbuminuria.  Creatinine stable.   Neuropathy: No.    Feet: OK, no ulcers. No swelling in joints.    Lipids:  LDL at target.  Patient taking statin.  Celiac screening: negative 2018    3.  Positive TPO antibodies-TSH normal on no therapy.     4. F/U in 6 months with Dr. Donahue, when he returns from [facility], in 1 year with me.     32 minutes spent on the date of the encounter doing chart review, review of test results, review of continuous glucose sensor, interpretation of glucose data, patient visit and documentation, counseling/coordination of care, and discussion of follow up plan for worsening hyper and hypoglycemia.  The patient understood and is satisfied with today's visit.        Liz Conroy PA-C, MPAS   [facility]  Department of Medicine  Division of Endocrinology and Diabetes

## 2024-07-15 NOTE — LETTER
"7/15/2024      RE: Franklin Muir  [address]  [place] [address]         To Whom It May Concern :     Franklin is a patient being treated by our clinic for diabetes mellitus requiring insulin. He/Him  is currently using an insulin infusion pump and performs blood glucose monitoring by either a finger stick and/or using a continuous glucose monitor. Be aware that if this person were to take off their pump without replacement of insulin, they would experience life-threatening consequences; a medical emergency know as \"diabetic ketoacidosis\". Therefore, it is essential that he/him wears their insulin pump at all times as it is continuously supplying insulin which is required for he/him survival.    In addition, hypoglycemic reactions (low blood glucose) are a possibility requiring treatment with glucose tablets or acceptable substitutes. It is vitally important that he/him have in their possession glucose tablets as well as the equipment necessary to test and treat he/him blood glucose levels. These supplies may include: continuous glucose monitor, blood glucose meter, test strips, lancets, an insulin pump, insulin pen needles, syringes, and a Glucagon Emergency Kit.     People using insulin pumps or continuous glucose monitors should not subject these devices to x-ray or full body scanner. These devices should be hand checked.    If you have any questions, please contact my office.       Thank you,         TRACEE DAS   "

## 2024-09-01 ENCOUNTER — HEALTH MAINTENANCE LETTER (OUTPATIENT)
Age: 54
End: 2024-09-01

## 2024-11-15 ENCOUNTER — TELEPHONE (OUTPATIENT)
Dept: FAMILY MEDICINE | Facility: CLINIC | Age: 54
End: 2024-11-15
Payer: COMMERCIAL

## 2024-11-15 NOTE — TELEPHONE ENCOUNTER
Patient Quality Outreach    Patient is due for the following:   Physical Preventive Adult Physical    Action(s) Taken:   Schedule a Adult Preventative    Type of outreach:    Sent [employer] message.    Questions for provider review:    None           Deedee Saucedo CMA  Chart routed to Care Team.

## 2025-01-27 NOTE — PROGRESS NOTES
Subjective:    Established patient, he previously saw Dr. Wilson (most recently 2021) and he follows with Liz HARRIS) with most recent appointment 2024.     Franklin Muir is a 54 year old male who presents for DM-1. 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    Diagnosed with DM: diagnosed in  and the diagnosis was incidentally on screening labs - insulin was first started a few years/month after time of diagnosis    History of DKA/HHS: no    FH of DM: brother with T1DM -  at age 26 from DKA    Glycemic control over the years: has been outstanding, most recently HbA1c 6.6% 2024 (stable)     Current DM therapy:  -Lantus 0-0-0-10 (Tresiba no longer covered by insurance)   -Humalog ICR 1:15, CS: 1/2 unit per 30 mg/dL >150 mg/dL during the day and 1/2 unit per 60 mg/dL >150 mg/dL at bedtime     Prior DM therapy:  -No prior use of insulin pump therapy     Current glycemic control: CGM reviewed     Diet: 3 meals/day, snacks at night     Physical activity: he runs a few times per week     Tobacco/alcohol use: no tobacco use, 1 beer at night     Complications noted in the A/P section.     Objective:    BMI 24.3 kg/m2, /84, DM foot exam performed and normal although he has onychodystrophy.     2024: BMI 25.3 kg/m2, /68. Appears well. Thyroid examined and normal.  No cervical lymphadenopathy.     2023: BMI 23.6 kg/m2, blood pressure 131/82. Appears well.  Thyroid examined and normal.  No cervical lymphadenopathy.  No lipohypertrophy at his abdominal insulin injection sites.  Diabetic foot exam performed and normal.    Assessment/Plan:    # DM-1  -: DEE Ab >250 (ULN 5.0 international unit(s)/mL)   -2009: C-peptide 2.3 ng/mL with concurrent glucose 84 mg/dL  -He has glucagon available PRN   # No retinopathy, DM eye exam 2024  -Scheduled for this week   # No hypertension, no nephropathy, not on an ACE-I/ARB    -2024: GFR >90, urine microalbumin undetectable   # No peripheral  neuropathy, no prior foot ulceration   # No prior ASCVD event, not on ASA, on atorvastatin 10 mg daily     -7/2024: , TG 40, HDL 56, LDL 60  # Hashimoto's thyroiditis   -TSH and free T4 always normal previously, most recently 7/2024 TSH 2.7  -2009: elevated TPO Ab   -No prior use of thyroid hormone   # Other  -7/2023: B12/MMA normal, Celiac screen normal      CGM reviewed in detail.  Over the past 2 weeks GMI 7.2%, time in range 61%, 1% low, 32% high, 6% very high.  Average glucose 162 mg/deciliter with a standard deviation of 48 mg/deciliter.    The only change that we made today is to adjust the insulin to carbohydrate ratio from 1:15-1:12.  If this leads to postprandial hypoglycemia, Amos will readjust to 1:15.    We reviewed insulin pump options today and Amos will think more about this.  He will let us know if he wants a referral to a diabetes educator to learn more details.    He will be seeing Liz in the spring.  Return to see me in the fall with labs ordered.    25 minutes spent on the date of the encounter doing chart review, history and exam, documentation and further activities as noted above. This did not include time spent on CGM review.     The longitudinal plan of care for the diagnosis(es)/condition(s) as documented were addressed during this visit. Due to the added complexity in care, I will continue to support Amos in the subsequent management and with ongoing continuity of care.

## 2025-01-28 ENCOUNTER — OFFICE VISIT (OUTPATIENT)
Dept: ENDOCRINOLOGY | Facility: CLINIC | Age: 55
End: 2025-01-28
Payer: COMMERCIAL

## 2025-01-28 VITALS
SYSTOLIC BLOOD PRESSURE: 118 MMHG | HEART RATE: 64 BPM | BODY MASS INDEX: 24.33 KG/M2 | DIASTOLIC BLOOD PRESSURE: 84 MMHG | WEIGHT: 172 LBS

## 2025-01-28 DIAGNOSIS — E78.2 MIXED HYPERLIPIDEMIA: ICD-10-CM

## 2025-01-28 DIAGNOSIS — Z79.4 LONG TERM (CURRENT) USE OF INSULIN (H): ICD-10-CM

## 2025-01-28 DIAGNOSIS — E06.3 HASHIMOTO'S THYROIDITIS: ICD-10-CM

## 2025-01-28 DIAGNOSIS — E10.9 TYPE 1 DIABETES MELLITUS WITHOUT COMPLICATION (H): Primary | ICD-10-CM

## 2025-01-28 NOTE — LETTER
2025      Franklin Muir  [address]  [place] [address]      Dear Colleague,    Thank you for referring your patient, Franklin Muir, to the [facility]. Please see a copy of my visit note below.    Subjective:    Established patient, he previously saw Dr. Wilson (most recently 2021) and he follows with Liz Conroy ([facility]) with most recent appointment 2024.     Franklin Muir is a 54 year old male who presents for DM-1. 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    Diagnosed with DM: diagnosed in  and the diagnosis was incidentally on screening labs - insulin was first started a few years/month after time of diagnosis    History of DKA/HHS: no    FH of DM: brother with T1DM -  at age 26 from DKA    Glycemic control over the years: has been outstanding, most recently HbA1c 6.6% 2024 (stable)     Current DM therapy:  -Lantus 0-0-0-10 (Tresiba no longer covered by insurance)   -Humalog ICR 1:15, CS: 1/2 unit per 30 mg/dL >150 mg/dL during the day and 1/2 unit per 60 mg/dL >150 mg/dL at bedtime     Prior DM therapy:  -No prior use of insulin pump therapy     Current glycemic control: CGM reviewed     Diet: 3 meals/day, snacks at night     Physical activity: he runs a few times per week     Tobacco/alcohol use: no tobacco use, 1 beer at night     Complications noted in the A/P section.     Objective:    BMI 24.3 kg/m2, /84, DM foot exam performed and normal although he has onychodystrophy.     2024: BMI 25.3 kg/m2, /68. Appears well. Thyroid examined and normal.  No cervical lymphadenopathy.     2023: BMI 23.6 kg/m2, blood pressure 131/82. Appears well.  Thyroid examined and normal.  No cervical lymphadenopathy.  No lipohypertrophy at his abdominal insulin injection sites.  Diabetic foot exam performed and normal.    Assessment/Plan:    # DM-1  -: DEE Ab >250 (ULN 5.0 international unit(s)/mL)   -2009: C-peptide 2.3 ng/mL with concurrent  glucose 84 mg/dL  -He has glucagon available PRN   # No retinopathy, DM eye exam 1/2024  -Scheduled for this week   # No hypertension, no nephropathy, not on an ACE-I/ARB    -7/2024: GFR >90, urine microalbumin undetectable   # No peripheral neuropathy, no prior foot ulceration   # No prior ASCVD event, not on ASA, on atorvastatin 10 mg daily     -7/2024: , TG 40, HDL 56, LDL 60  # Hashimoto's thyroiditis   -TSH and free T4 always normal previously, most recently 7/2024 TSH 2.7  -2009: elevated TPO Ab   -No prior use of thyroid hormone   # Other  -7/2023: B12/MMA normal, Celiac screen normal      CGM reviewed in detail.  Over the past 2 weeks GMI 7.2%, time in range 61%, 1% low, 32% high, 6% very high.  Average glucose 162 mg/deciliter with a standard deviation of 48 mg/deciliter.    The only change that we made today is to adjust the insulin to carbohydrate ratio from 1:15-1:12.  If this leads to postprandial hypoglycemia, Amos will readjust to 1:15.    We reviewed insulin pump options today and Amos will think more about this.  He will let us know if he wants a referral to a diabetes educator to learn more details.    He will be seeing Liz in the spring.  Return to see me in the fall with labs ordered.    25 minutes spent on the date of the encounter doing chart review, history and exam, documentation and further activities as noted above. This did not include time spent on CGM review.     The longitudinal plan of care for the diagnosis(es)/condition(s) as documented were addressed during this visit. Due to the added complexity in care, I will continue to support Amos in the subsequent management and with ongoing continuity of care.      Again, thank you for allowing me to participate in the care of your patient.        Sincerely,        Neto Donahue MD    Electronically signed

## 2025-02-02 ENCOUNTER — HEALTH MAINTENANCE LETTER (OUTPATIENT)
Age: 55
End: 2025-02-02

## 2025-04-20 DIAGNOSIS — E10.9 WELL CONTROLLED TYPE 1 DIABETES MELLITUS (H): ICD-10-CM

## 2025-04-23 RX ORDER — PROCHLORPERAZINE 25 MG/1
SUPPOSITORY RECTAL
Qty: 1 EACH | Refills: 1 | Status: SHIPPED | OUTPATIENT
Start: 2025-04-23

## 2025-04-23 NOTE — TELEPHONE ENCOUNTER
Requested Prescriptions   Pending Prescriptions Disp Refills    Continuous Glucose Transmitter (DEXCOM G6 TRANSMITTER) MISC [Pharmacy Med Name: DEXCOM G6 TRANSMITTER  MISC] 1 each 3     Sig: CHANGE EVERY 3 MONTHS.       Diabetic Supplies Protocol Passed - 4/23/2025 11:25 AM        Passed - Medication is active on med list and the sig matches. RN to manually verify dose and sig if red X/fail.     If the protocol passes (green check), you do not need to verify med dose and sig.    A prescription matches if they are the same clinical intention.    For Example: once daily and every morning are the same.    The protocol can not identify upper and lower case letters as matching and will fail.     For Example: Take 1 tablet (50 mg) by mouth daily     TAKE 1 TABLET (50 MG) BY MOUTH DAILY    For all fails (red x), verify dose and sig.    If the refill does match what is on file, the RN can still proceed to approve the refill request.       If they do not match, route to the appropriate provider.             Passed - Recent (12 month) or future (90 days) visit with authorizing provider s specialty     The patient must have completed an in-person or virtual visit within the past 12 months or has a future visit scheduled within the next 90 days with the authorizing provider s specialty.  Urgent care and e-visits do not qualify as an office visit for this protocol.          Passed - Medication indicated for associated diagnosis        Passed - Patient is 18 years of age or older

## 2025-07-19 DIAGNOSIS — E10.9 TYPE 1 DIABETES MELLITUS WITHOUT COMPLICATION (H): ICD-10-CM

## 2025-07-22 RX ORDER — INSULIN GLARGINE 100 [IU]/ML
INJECTION, SOLUTION SUBCUTANEOUS
Qty: 15 ML | Refills: 1 | Status: SHIPPED | OUTPATIENT
Start: 2025-07-22

## 2025-08-17 ENCOUNTER — MYC REFILL (OUTPATIENT)
Dept: ENDOCRINOLOGY | Facility: CLINIC | Age: 55
End: 2025-08-17
Payer: COMMERCIAL

## 2025-08-17 DIAGNOSIS — E10.9 WELL CONTROLLED TYPE 1 DIABETES MELLITUS (H): ICD-10-CM

## 2025-08-18 ENCOUNTER — TELEPHONE (OUTPATIENT)
Dept: ENDOCRINOLOGY | Facility: CLINIC | Age: 55
End: 2025-08-18
Payer: COMMERCIAL

## 2025-08-18 RX ORDER — PROCHLORPERAZINE 25 MG/1
SUPPOSITORY RECTAL
Qty: 9 EACH | Refills: 0 | Status: SHIPPED | OUTPATIENT
Start: 2025-08-18

## 2025-08-23 ENCOUNTER — HEALTH MAINTENANCE LETTER (OUTPATIENT)
Age: 55
End: 2025-08-23

## 2025-08-25 DIAGNOSIS — E10.9 TYPE 1 DIABETES MELLITUS WITHOUT COMPLICATION (H): ICD-10-CM

## 2025-08-27 RX ORDER — INSULIN LISPRO 100 [IU]/ML
INJECTION, SOLUTION SUBCUTANEOUS
Qty: 30 ML | Refills: 0 | Status: SHIPPED | OUTPATIENT
Start: 2025-08-27

## (undated) DEVICE — PREP CHLORAPREP 26ML TINTED ORANGE  260815

## (undated) DEVICE — SOL WATER IRRIG 1000ML BOTTLE 07139-09

## (undated) RX ORDER — FENTANYL CITRATE 50 UG/ML
INJECTION, SOLUTION INTRAMUSCULAR; INTRAVENOUS
Status: DISPENSED
Start: 2022-08-08

## (undated) RX ORDER — LIDOCAINE HYDROCHLORIDE 20 MG/ML
INJECTION, SOLUTION INFILTRATION; PERINEURAL
Status: DISPENSED
Start: 2019-01-10